# Patient Record
Sex: MALE | Race: WHITE | NOT HISPANIC OR LATINO | Employment: OTHER | URBAN - NONMETROPOLITAN AREA
[De-identification: names, ages, dates, MRNs, and addresses within clinical notes are randomized per-mention and may not be internally consistent; named-entity substitution may affect disease eponyms.]

---

## 2017-08-14 ENCOUNTER — OUTSIDE FACILITY SERVICE (OUTPATIENT)
Dept: CARDIOLOGY | Facility: CLINIC | Age: 67
End: 2017-08-14

## 2017-08-14 PROCEDURE — 93010 ELECTROCARDIOGRAM REPORT: CPT | Performed by: INTERNAL MEDICINE

## 2017-08-15 ENCOUNTER — APPOINTMENT (OUTPATIENT)
Dept: GENERAL RADIOLOGY | Facility: HOSPITAL | Age: 67
End: 2017-08-15

## 2017-08-15 ENCOUNTER — HOSPITAL ENCOUNTER (INPATIENT)
Facility: HOSPITAL | Age: 67
LOS: 4 days | Discharge: HOME HEALTH | End: 2017-08-19
Attending: FAMILY MEDICINE | Admitting: INTERNAL MEDICINE

## 2017-08-15 ENCOUNTER — APPOINTMENT (OUTPATIENT)
Dept: CARDIOLOGY | Facility: HOSPITAL | Age: 67
End: 2017-08-15
Attending: FAMILY MEDICINE

## 2017-08-15 DIAGNOSIS — Z74.09 IMPAIRED MOBILITY AND ADLS: ICD-10-CM

## 2017-08-15 DIAGNOSIS — Z74.09 IMPAIRED FUNCTIONAL MOBILITY, BALANCE, GAIT, AND ENDURANCE: ICD-10-CM

## 2017-08-15 DIAGNOSIS — Z78.9 IMPAIRED MOBILITY AND ADLS: ICD-10-CM

## 2017-08-15 PROBLEM — J18.9 HCAP (HEALTHCARE-ASSOCIATED PNEUMONIA): Status: ACTIVE | Noted: 2017-08-15

## 2017-08-15 LAB
ALBUMIN SERPL-MCNC: 3.3 G/DL (ref 3.5–5)
ALBUMIN/GLOB SERPL: 1 G/DL (ref 1.1–2.5)
ALP SERPL-CCNC: 48 U/L (ref 24–120)
ALT SERPL W P-5'-P-CCNC: 27 U/L (ref 0–54)
ANION GAP SERPL CALCULATED.3IONS-SCNC: 9 MMOL/L (ref 4–13)
ARTERIAL PATENCY WRIST A: ABNORMAL
ARTERIAL PATENCY WRIST A: ABNORMAL
AST SERPL-CCNC: 15 U/L (ref 7–45)
ATMOSPHERIC PRESS: ABNORMAL MMHG
ATMOSPHERIC PRESS: ABNORMAL MMHG
BASE EXCESS BLDA CALC-SCNC: 6.2 MMOL/L (ref -2–2)
BASE EXCESS BLDA CALC-SCNC: 6.8 MMOL/L (ref -2–2)
BASOPHILS # BLD AUTO: 0.02 10*3/MM3 (ref 0–0.2)
BASOPHILS NFR BLD AUTO: 0.2 % (ref 0–2)
BDY SITE: ABNORMAL
BDY SITE: ABNORMAL
BH CV ECHO MEAS - AO MAX PG (FULL): 2.8 MMHG
BH CV ECHO MEAS - AO MAX PG: 5.2 MMHG
BH CV ECHO MEAS - AO MEAN PG (FULL): 2 MMHG
BH CV ECHO MEAS - AO MEAN PG: 3 MMHG
BH CV ECHO MEAS - AO ROOT AREA: 12.6 CM^2
BH CV ECHO MEAS - AO ROOT DIAM: 4 CM
BH CV ECHO MEAS - AO V2 MAX: 114 CM/SEC
BH CV ECHO MEAS - AO V2 MEAN: 78.4 CM/SEC
BH CV ECHO MEAS - AO V2 VTI: 19.8 CM
BH CV ECHO MEAS - AVA(I,A): 2.4 CM^2
BH CV ECHO MEAS - AVA(I,D): 2.4 CM^2
BH CV ECHO MEAS - AVA(V,A): 2.3 CM^2
BH CV ECHO MEAS - AVA(V,D): 2.3 CM^2
BH CV ECHO MEAS - CONTRAST EF 4CH: 40.4 ML/M^2
BH CV ECHO MEAS - EDV(CUBED): 171 ML
BH CV ECHO MEAS - EDV(MOD-SP4): 203 ML
BH CV ECHO MEAS - EDV(TEICH): 150.5 ML
BH CV ECHO MEAS - EF(CUBED): 42.5 %
BH CV ECHO MEAS - EF(MOD-SP4): 40.4 %
BH CV ECHO MEAS - EF(TEICH): 34.8 %
BH CV ECHO MEAS - ESV(CUBED): 98.3 ML
BH CV ECHO MEAS - ESV(MOD-SP4): 121 ML
BH CV ECHO MEAS - ESV(TEICH): 98.1 ML
BH CV ECHO MEAS - FS: 16.8 %
BH CV ECHO MEAS - IVS/LVPW: 1
BH CV ECHO MEAS - IVSD: 1.2 CM
BH CV ECHO MEAS - LA DIMENSION: 5.2 CM
BH CV ECHO MEAS - LA/AO: 1.3
BH CV ECHO MEAS - LAT PEAK E' VEL: 8.6 CM/SEC
BH CV ECHO MEAS - LV MASS(C)D: 282 GRAMS
BH CV ECHO MEAS - LV MAX PG: 2.4 MMHG
BH CV ECHO MEAS - LV MEAN PG: 1 MMHG
BH CV ECHO MEAS - LV V1 MAX: 76.9 CM/SEC
BH CV ECHO MEAS - LV V1 MEAN: 50.2 CM/SEC
BH CV ECHO MEAS - LV V1 VTI: 13.7 CM
BH CV ECHO MEAS - LVIDD: 5.6 CM
BH CV ECHO MEAS - LVIDS: 4.6 CM
BH CV ECHO MEAS - LVLD AP4: 9.8 CM
BH CV ECHO MEAS - LVLS AP4: 9 CM
BH CV ECHO MEAS - LVOT AREA (M): 3.5 CM^2
BH CV ECHO MEAS - LVOT AREA: 3.5 CM^2
BH CV ECHO MEAS - LVOT DIAM: 2.1 CM
BH CV ECHO MEAS - LVPWD: 1.2 CM
BH CV ECHO MEAS - MV DEC TIME: 0.18 SEC
BH CV ECHO MEAS - MV E MAX VEL: 107 CM/SEC
BH CV ECHO MEAS - RAP SYSTOLE: 5 MMHG
BH CV ECHO MEAS - RVSP: 30.8 MMHG
BH CV ECHO MEAS - SV(AO): 248.8 ML
BH CV ECHO MEAS - SV(CUBED): 72.7 ML
BH CV ECHO MEAS - SV(LVOT): 47.5 ML
BH CV ECHO MEAS - SV(MOD-SP4): 82 ML
BH CV ECHO MEAS - SV(TEICH): 52.4 ML
BH CV ECHO MEAS - TR MAX VEL: 254 CM/SEC
BILIRUB SERPL-MCNC: 0.5 MG/DL (ref 0.1–1)
BILIRUB UR QL STRIP: NEGATIVE
BUN BLD-MCNC: 17 MG/DL (ref 5–21)
BUN/CREAT SERPL: 14 (ref 7–25)
CALCIUM SPEC-SCNC: 8.2 MG/DL (ref 8.4–10.4)
CHLORIDE SERPL-SCNC: 102 MMOL/L (ref 98–110)
CLARITY UR: CLEAR
CO2 SERPL-SCNC: 34 MMOL/L (ref 24–31)
COLOR UR: YELLOW
CREAT BLD-MCNC: 1.21 MG/DL (ref 0.5–1.4)
D-LACTATE SERPL-SCNC: 1.5 MMOL/L (ref 0.5–2)
DEPRECATED RDW RBC AUTO: 49.2 FL (ref 40–54)
E/E' RATIO: 22.3
EOSINOPHIL # BLD AUTO: 0.28 10*3/MM3 (ref 0–0.7)
EOSINOPHIL NFR BLD AUTO: 2.9 % (ref 0–4)
EPAP: 8
EPAP: 8
ERYTHROCYTE [DISTWIDTH] IN BLOOD BY AUTOMATED COUNT: 14.7 % (ref 12–15)
GFR SERPL CREATININE-BSD FRML MDRD: 60 ML/MIN/1.73
GLOBULIN UR ELPH-MCNC: 3.2 GM/DL
GLUCOSE BLD-MCNC: 95 MG/DL (ref 70–100)
GLUCOSE UR STRIP-MCNC: NEGATIVE MG/DL
HCO3 BLDA-SCNC: 34.2 MMOL/L (ref 22–26)
HCO3 BLDA-SCNC: 36 MMOL/L (ref 22–26)
HCT VFR BLD AUTO: 31.5 % (ref 40–52)
HGB BLD-MCNC: 9.8 G/DL (ref 14–18)
HGB UR QL STRIP.AUTO: NEGATIVE
HOROWITZ INDEX BLD+IHG-RTO: 21 %
HOROWITZ INDEX BLD+IHG-RTO: 40 %
IMM GRANULOCYTES # BLD: 0.03 10*3/MM3 (ref 0–0.03)
IMM GRANULOCYTES NFR BLD: 0.3 % (ref 0–5)
IPAP: 18
IPAP: 20
KETONES UR QL STRIP: NEGATIVE
LEFT ATRIUM VOLUME: 106 CM3
LEUKOCYTE ESTERASE UR QL STRIP.AUTO: NEGATIVE
LYMPHOCYTES # BLD AUTO: 0.92 10*3/MM3 (ref 0.72–4.86)
LYMPHOCYTES NFR BLD AUTO: 9.5 % (ref 15–45)
Lab: ABNORMAL
MAGNESIUM SERPL-MCNC: 1.8 MG/DL (ref 1.4–2.2)
MCH RBC QN AUTO: 28.6 PG (ref 28–32)
MCHC RBC AUTO-ENTMCNC: 31.1 G/DL (ref 33–36)
MCV RBC AUTO: 91.8 FL (ref 82–95)
MODALITY: ABNORMAL
MODALITY: ABNORMAL
MONOCYTES # BLD AUTO: 0.53 10*3/MM3 (ref 0.19–1.3)
MONOCYTES NFR BLD AUTO: 5.5 % (ref 4–12)
NEUTROPHILS # BLD AUTO: 7.94 10*3/MM3 (ref 1.87–8.4)
NEUTROPHILS NFR BLD AUTO: 81.6 % (ref 39–78)
NITRITE UR QL STRIP: NEGATIVE
NOTIFIED BY: ABNORMAL
NOTIFIED WHO: ABNORMAL
PCO2 BLDA: 60.2 MM HG (ref 35–45)
PCO2 BLDA: 77.3 MM HG (ref 35–45)
PH BLDA: 7.29 PH UNITS (ref 7.35–7.45)
PH BLDA: 7.37 PH UNITS (ref 7.35–7.45)
PH UR STRIP.AUTO: <=5 [PH] (ref 5–8)
PHOSPHATE SERPL-MCNC: 4.5 MG/DL (ref 2.5–4.5)
PLATELET # BLD AUTO: 129 10*3/MM3 (ref 130–400)
PMV BLD AUTO: 11.1 FL (ref 6–12)
PO2 BLDA: 57.7 MM HG (ref 80–100)
PO2 BLDA: 96.2 MM HG (ref 80–100)
POTASSIUM BLD-SCNC: 3.7 MMOL/L (ref 3.5–5.3)
PROT SERPL-MCNC: 6.5 G/DL (ref 6.3–8.7)
PROT UR QL STRIP: NEGATIVE
RBC # BLD AUTO: 3.43 10*6/MM3 (ref 4.8–5.9)
SAO2 % BLDCOA: 88.6 % (ref 94–100)
SAO2 % BLDCOA: 88.6 % (ref 94–100)
SAO2 % BLDCOA: 96.2 % (ref 94–100)
SAO2 % BLDCOA: 96.2 % (ref 94–100)
SODIUM BLD-SCNC: 145 MMOL/L (ref 135–145)
SP GR UR STRIP: >1.03 (ref 1–1.03)
TOTAL RATE: 12 BREATHS/MINUTE
TOTAL RATE: 12 BREATHS/MINUTE
TROPONIN I SERPL-MCNC: 0.02 NG/ML (ref 0–0.03)
UROBILINOGEN UR QL STRIP: ABNORMAL
WBC NRBC COR # BLD: 9.72 10*3/MM3 (ref 4.8–10.8)

## 2017-08-15 PROCEDURE — 94799 UNLISTED PULMONARY SVC/PX: CPT

## 2017-08-15 PROCEDURE — 94660 CPAP INITIATION&MGMT: CPT

## 2017-08-15 PROCEDURE — 5A09457 ASSISTANCE WITH RESPIRATORY VENTILATION, 24-96 CONSECUTIVE HOURS, CONTINUOUS POSITIVE AIRWAY PRESSURE: ICD-10-PCS | Performed by: INTERNAL MEDICINE

## 2017-08-15 PROCEDURE — 25010000002 PIPERACILLIN SOD-TAZOBACTAM PER 1 G: Performed by: FAMILY MEDICINE

## 2017-08-15 PROCEDURE — 25010000002 VANCOMYCIN 10 G RECONSTITUTED SOLUTION

## 2017-08-15 PROCEDURE — 84100 ASSAY OF PHOSPHORUS: CPT | Performed by: INTERNAL MEDICINE

## 2017-08-15 PROCEDURE — 81003 URINALYSIS AUTO W/O SCOPE: CPT | Performed by: FAMILY MEDICINE

## 2017-08-15 PROCEDURE — 80053 COMPREHEN METABOLIC PANEL: CPT | Performed by: INTERNAL MEDICINE

## 2017-08-15 PROCEDURE — 84484 ASSAY OF TROPONIN QUANT: CPT | Performed by: INTERNAL MEDICINE

## 2017-08-15 PROCEDURE — 94760 N-INVAS EAR/PLS OXIMETRY 1: CPT

## 2017-08-15 PROCEDURE — 93306 TTE W/DOPPLER COMPLETE: CPT | Performed by: INTERNAL MEDICINE

## 2017-08-15 PROCEDURE — 82803 BLOOD GASES ANY COMBINATION: CPT

## 2017-08-15 PROCEDURE — C8929 TTE W OR WO FOL WCON,DOPPLER: HCPCS

## 2017-08-15 PROCEDURE — 25010000002 LEVOFLOXACIN PER 250 MG: Performed by: INTERNAL MEDICINE

## 2017-08-15 PROCEDURE — 71010 HC CHEST PA OR AP: CPT

## 2017-08-15 PROCEDURE — 25010000002 HEPARIN (PORCINE) PER 1000 UNITS: Performed by: FAMILY MEDICINE

## 2017-08-15 PROCEDURE — 36600 WITHDRAWAL OF ARTERIAL BLOOD: CPT

## 2017-08-15 PROCEDURE — 25010000002 FUROSEMIDE PER 20 MG: Performed by: FAMILY MEDICINE

## 2017-08-15 PROCEDURE — 94640 AIRWAY INHALATION TREATMENT: CPT

## 2017-08-15 PROCEDURE — 25010000002 VANCOMYCIN PER 500 MG: Performed by: FAMILY MEDICINE

## 2017-08-15 PROCEDURE — 87040 BLOOD CULTURE FOR BACTERIA: CPT | Performed by: FAMILY MEDICINE

## 2017-08-15 PROCEDURE — 85025 COMPLETE CBC W/AUTO DIFF WBC: CPT | Performed by: INTERNAL MEDICINE

## 2017-08-15 PROCEDURE — 25010000002 PERFLUTREN (DEFINITY) 8.476 MG IN SODIUM CHLORIDE 10 ML INJECTION: Performed by: INTERNAL MEDICINE

## 2017-08-15 PROCEDURE — 83605 ASSAY OF LACTIC ACID: CPT | Performed by: FAMILY MEDICINE

## 2017-08-15 PROCEDURE — 87086 URINE CULTURE/COLONY COUNT: CPT | Performed by: FAMILY MEDICINE

## 2017-08-15 PROCEDURE — 83735 ASSAY OF MAGNESIUM: CPT | Performed by: INTERNAL MEDICINE

## 2017-08-15 RX ORDER — DILTIAZEM HYDROCHLORIDE 240 MG/1
240 CAPSULE, COATED, EXTENDED RELEASE ORAL DAILY
Status: ON HOLD | COMMUNITY
End: 2017-08-15

## 2017-08-15 RX ORDER — HEPARIN SODIUM 5000 [USP'U]/ML
5000 INJECTION, SOLUTION INTRAVENOUS; SUBCUTANEOUS EVERY 8 HOURS SCHEDULED
Status: DISCONTINUED | OUTPATIENT
Start: 2017-08-15 | End: 2017-08-16

## 2017-08-15 RX ORDER — METOPROLOL SUCCINATE 100 MG/1
100 TABLET, EXTENDED RELEASE ORAL 2 TIMES DAILY
Status: ON HOLD | COMMUNITY
End: 2018-03-19

## 2017-08-15 RX ORDER — GUAIFENESIN 600 MG/1
1200 TABLET, EXTENDED RELEASE ORAL EVERY 12 HOURS SCHEDULED
Status: DISCONTINUED | OUTPATIENT
Start: 2017-08-15 | End: 2017-08-19 | Stop reason: HOSPADM

## 2017-08-15 RX ORDER — QUETIAPINE 200 MG/1
400 TABLET, FILM COATED, EXTENDED RELEASE ORAL NIGHTLY
Status: DISCONTINUED | OUTPATIENT
Start: 2017-08-15 | End: 2017-08-15 | Stop reason: DRUGHIGH

## 2017-08-15 RX ORDER — DIVALPROEX SODIUM 500 MG/1
1000 TABLET, DELAYED RELEASE ORAL DAILY
COMMUNITY

## 2017-08-15 RX ORDER — FUROSEMIDE 10 MG/ML
40 INJECTION INTRAMUSCULAR; INTRAVENOUS DAILY
Status: DISCONTINUED | OUTPATIENT
Start: 2017-08-15 | End: 2017-08-16

## 2017-08-15 RX ORDER — QUETIAPINE FUMARATE 200 MG/1
400 TABLET, FILM COATED ORAL NIGHTLY
Status: DISCONTINUED | OUTPATIENT
Start: 2017-08-15 | End: 2017-08-19 | Stop reason: HOSPADM

## 2017-08-15 RX ORDER — ASPIRIN 81 MG/1
81 TABLET ORAL DAILY
COMMUNITY

## 2017-08-15 RX ORDER — ASPIRIN 81 MG/1
81 TABLET ORAL DAILY
Status: DISCONTINUED | OUTPATIENT
Start: 2017-08-16 | End: 2017-08-19 | Stop reason: HOSPADM

## 2017-08-15 RX ORDER — QUETIAPINE FUMARATE 400 MG/1
500 TABLET, FILM COATED ORAL NIGHTLY
COMMUNITY
End: 2018-03-25 | Stop reason: HOSPADM

## 2017-08-15 RX ORDER — ASPIRIN 325 MG
325 TABLET, DELAYED RELEASE (ENTERIC COATED) ORAL DAILY
Status: DISCONTINUED | OUTPATIENT
Start: 2017-08-15 | End: 2017-08-15 | Stop reason: DRUGHIGH

## 2017-08-15 RX ORDER — ALLOPURINOL 100 MG/1
100 TABLET ORAL DAILY
COMMUNITY

## 2017-08-15 RX ORDER — DABIGATRAN ETEXILATE 150 MG/1
150 CAPSULE ORAL 2 TIMES DAILY
COMMUNITY

## 2017-08-15 RX ORDER — IPRATROPIUM BROMIDE AND ALBUTEROL SULFATE 2.5; .5 MG/3ML; MG/3ML
3 SOLUTION RESPIRATORY (INHALATION) EVERY 4 HOURS PRN
Status: DISCONTINUED | OUTPATIENT
Start: 2017-08-15 | End: 2017-08-15

## 2017-08-15 RX ORDER — DILTIAZEM HYDROCHLORIDE 240 MG/1
240 CAPSULE, COATED, EXTENDED RELEASE ORAL
Status: DISCONTINUED | OUTPATIENT
Start: 2017-08-15 | End: 2017-08-15 | Stop reason: DRUGHIGH

## 2017-08-15 RX ORDER — QUETIAPINE FUMARATE 200 MG/1
200 TABLET, FILM COATED ORAL NIGHTLY
Status: ON HOLD | COMMUNITY
End: 2017-08-15

## 2017-08-15 RX ORDER — LEVOFLOXACIN 5 MG/ML
750 INJECTION, SOLUTION INTRAVENOUS EVERY 24 HOURS
Status: DISCONTINUED | OUTPATIENT
Start: 2017-08-15 | End: 2017-08-16

## 2017-08-15 RX ORDER — DILTIAZEM HYDROCHLORIDE 180 MG/1
180 CAPSULE, COATED, EXTENDED RELEASE ORAL
Status: DISCONTINUED | OUTPATIENT
Start: 2017-08-16 | End: 2017-08-19

## 2017-08-15 RX ORDER — METOPROLOL SUCCINATE 100 MG/1
100 TABLET, EXTENDED RELEASE ORAL EVERY 12 HOURS SCHEDULED
Status: DISCONTINUED | OUTPATIENT
Start: 2017-08-15 | End: 2017-08-19 | Stop reason: HOSPADM

## 2017-08-15 RX ORDER — ATORVASTATIN CALCIUM 20 MG/1
20 TABLET, FILM COATED ORAL DAILY
COMMUNITY

## 2017-08-15 RX ORDER — SODIUM CHLORIDE 0.9 % (FLUSH) 0.9 %
1-10 SYRINGE (ML) INJECTION AS NEEDED
Status: DISCONTINUED | OUTPATIENT
Start: 2017-08-15 | End: 2017-08-19 | Stop reason: HOSPADM

## 2017-08-15 RX ORDER — DILTIAZEM HYDROCHLORIDE 180 MG/1
180 CAPSULE, COATED, EXTENDED RELEASE ORAL DAILY
COMMUNITY
End: 2017-08-19 | Stop reason: HOSPADM

## 2017-08-15 RX ORDER — LISINOPRIL 20 MG/1
40 TABLET ORAL DAILY
Status: DISCONTINUED | OUTPATIENT
Start: 2017-08-15 | End: 2017-08-15

## 2017-08-15 RX ORDER — IPRATROPIUM BROMIDE AND ALBUTEROL SULFATE 2.5; .5 MG/3ML; MG/3ML
3 SOLUTION RESPIRATORY (INHALATION)
Status: DISCONTINUED | OUTPATIENT
Start: 2017-08-15 | End: 2017-08-17

## 2017-08-15 RX ADMIN — IPRATROPIUM BROMIDE AND ALBUTEROL SULFATE 3 ML: .5; 3 SOLUTION RESPIRATORY (INHALATION) at 08:08

## 2017-08-15 RX ADMIN — VANCOMYCIN HYDROCHLORIDE 2000 MG: 1 INJECTION, POWDER, LYOPHILIZED, FOR SOLUTION INTRAVENOUS at 08:40

## 2017-08-15 RX ADMIN — SODIUM CHLORIDE 750 MG: 900 INJECTION, SOLUTION INTRAVENOUS at 23:19

## 2017-08-15 RX ADMIN — GUAIFENESIN 1200 MG: 600 TABLET, EXTENDED RELEASE ORAL at 20:13

## 2017-08-15 RX ADMIN — TAZOBACTAM SODIUM AND PIPERACILLIN SODIUM 4.5 G: 500; 4 INJECTION, SOLUTION INTRAVENOUS at 08:40

## 2017-08-15 RX ADMIN — METOPROLOL SUCCINATE 100 MG: 100 TABLET, FILM COATED, EXTENDED RELEASE ORAL at 20:13

## 2017-08-15 RX ADMIN — LEVOFLOXACIN 750 MG: 5 INJECTION, SOLUTION INTRAVENOUS at 08:40

## 2017-08-15 RX ADMIN — HEPARIN SODIUM 5000 UNITS: 5000 INJECTION, SOLUTION INTRAVENOUS; SUBCUTANEOUS at 15:00

## 2017-08-15 RX ADMIN — IPRATROPIUM BROMIDE AND ALBUTEROL SULFATE 3 ML: .5; 3 SOLUTION RESPIRATORY (INHALATION) at 12:25

## 2017-08-15 RX ADMIN — METOPROLOL TARTRATE 150 MG: 100 TABLET ORAL at 08:40

## 2017-08-15 RX ADMIN — DILTIAZEM HYDROCHLORIDE 240 MG: 240 CAPSULE, EXTENDED RELEASE ORAL at 08:40

## 2017-08-15 RX ADMIN — HEPARIN SODIUM 5000 UNITS: 5000 INJECTION, SOLUTION INTRAVENOUS; SUBCUTANEOUS at 08:41

## 2017-08-15 RX ADMIN — TAZOBACTAM SODIUM AND PIPERACILLIN SODIUM 4.5 G: 500; 4 INJECTION, SOLUTION INTRAVENOUS at 15:00

## 2017-08-15 RX ADMIN — SODIUM CHLORIDE 3 ML: 9 INJECTION INTRAMUSCULAR; INTRAVENOUS; SUBCUTANEOUS at 11:01

## 2017-08-15 RX ADMIN — IPRATROPIUM BROMIDE AND ALBUTEROL SULFATE 3 ML: .5; 3 SOLUTION RESPIRATORY (INHALATION) at 18:52

## 2017-08-15 RX ADMIN — TAZOBACTAM SODIUM AND PIPERACILLIN SODIUM 4.5 G: 500; 4 INJECTION, SOLUTION INTRAVENOUS at 20:14

## 2017-08-15 RX ADMIN — ASPIRIN 325 MG: 325 TABLET, COATED ORAL at 08:40

## 2017-08-15 RX ADMIN — HEPARIN SODIUM 5000 UNITS: 5000 INJECTION, SOLUTION INTRAVENOUS; SUBCUTANEOUS at 23:28

## 2017-08-15 RX ADMIN — QUETIAPINE FUMARATE 400 MG: 200 TABLET, FILM COATED ORAL at 20:13

## 2017-08-15 RX ADMIN — FUROSEMIDE 40 MG: 10 INJECTION, SOLUTION INTRAMUSCULAR; INTRAVENOUS at 08:41

## 2017-08-15 RX ADMIN — GUAIFENESIN 1200 MG: 600 TABLET, EXTENDED RELEASE ORAL at 08:40

## 2017-08-15 NOTE — PROGRESS NOTES
"Discharge Planning Assessment  [facility]     Patient Name: Froy Hoyos  MRN: [id number]  Today's Date: 8/15/2017    Admit Date: 8/15/2017          Discharge Needs Assessment       08/15/17 144    Living Environment    Lives With alone    Living Arrangements apartment    Transportation Available car;family or friend will provide    Living Environment    Provides Primary Care For no one    Quality Of Family Relationships supportive    Able to Return to Prior Living Arrangements yes    Discharge Needs Assessment    Concerns To Be Addressed care coordination/care conferences;discharge planning concerns    Readmission Within The Last 30 Days no previous admission in last 30 days    Anticipated Changes Related to Illness none    Equipment Currently Used at Home bipap/ cpap;oxygen    Current Discharge Risk chronically ill    Discharge Planning Comments Attempted to speak to patient regarding discharge plan/needs.  Patient stated, \"I don't need you, I need something to eat.\"  Patient reluctantly participated in discharge planning, stating he resides alone, and he does not drive but his friend takes him to his doctor's appointments.  Patient states he has a cpap and home O2.  Patient is [facility] with [facility].  Patient receives primary care from the [facility] clinic.  Patient's discharge summary will need to be faxed to the [facility] clinic.                Discharge Plan     None        Discharge Placement     No information found                Demographic Summary     None            Functional Status     None            Psychosocial     None            Abuse/Neglect     None            Legal     None            Substance Abuse     None            Patient Forms     None          Iliana Hankins, SHANW    "

## 2017-08-15 NOTE — CONSULTS
PULMONARY & CRITICAL CARE CONSULT - [facility]    08/15/17, 9:51 AM  Patient Care Team:  No Known Provider as PCP - General  Name: Froy Hoyos  : 1950  MRN: [id number]  Contact Serial Number [id number]    Chief complaint: Dyspnea    HPI:  We have been consulted by Clint Torres DO to see this 66 y.o. year old male born on 1950.  Patient complains of dyspnea in the chest for a few days. Severity: severe.  Aggravating factors: walking.   Alleviating factors: medication(s) (albuterol and atrovent) Associated symptoms: fatigue and fevers. Sputum is absent.  Patient currently is on oxygen at 3 L/min per nasal cannula.. Respiratory history: COPD  Pt presented by ems who found him hypoxic and on arrival to ER he was febrile, received antibiotics and appeard septic.  Pt known to have hx of chf.  Past Medical History:  Past Medical History:   Diagnosis Date   • Atrial fibrillation    • CHF (congestive heart failure)    • Hypertension    • Stroke      No past surgical history on file.  No Known Allergies  Medications:    aspirin 325 mg Oral Daily   diltiaZEM  mg Oral Q AM   furosemide 40 mg Intravenous Daily   guaiFENesin 1,200 mg Oral Q12H   heparin (porcine) 5,000 Units Subcutaneous Q8H   ipratropium-albuterol 3 mL Nebulization Q6H - RT   levoFLOXacin 750 mg Intravenous Q24H   metoprolol tartrate 150 mg Oral Q12H   perflutren      piperacillin-tazobactam 4.5 g Intravenous Q6H   And      vancomycin 20 mg/kg Intravenous Once   QUEtiapine  mg Oral Nightly   vancomycin 750 mg Intravenous Q12H       Pharmacy to dose vancomycin      Family History:  Family History   Problem Relation Age of Onset   • Diabetes Daughter      Social History:   reports that he quit smoking about 15 months ago. He does not have any smokeless tobacco history on file. He reports that he does not drink alcohol or use illicit drugs.  Review of Systems:  Review of Systems   Constitutional: Positive for fever.  Negative for appetite change, diaphoresis and fatigue.   HENT: Negative for congestion and trouble swallowing.    Eyes: Negative for visual disturbance.   Respiratory: Negative for choking, chest tightness, shortness of breath and stridor.    Cardiovascular: Negative for chest pain and leg swelling.   Gastrointestinal: Positive for vomiting. Negative for abdominal pain, constipation, diarrhea and nausea.   Endocrine: Negative for heat intolerance.   Genitourinary: Negative for hematuria.   Musculoskeletal: Negative for joint swelling.   Skin: Negative for color change, pallor and rash.   Neurological: Negative for dizziness, seizures and syncope.   Hematological: Negative for adenopathy.   Psychiatric/Behavioral: Negative for agitation.      Physical Exam:  Temp:  [97.7 °F (36.5 °C)-99 °F (37.2 °C)] 97.7 °F (36.5 °C)  Heart Rate:  [73-91] 83  Resp:  [16-21] 20  BP: (102-121)/(55-77) 121/77  Intake/Output Summary (Last 24 hours) at 08/15/17 0951  Last data filed at 08/15/17 0840   Gross per 24 hour   Intake              750 ml   Output              880 ml   Net             -130 ml     Last 3 weights    08/15/17  0020   Weight: 221 lb 9.6 oz (101 kg)     SpO2 Readings from Last 3 Encounters:   08/15/17 95%   10/02/16 93%   09/26/16 96%     Physical Exam   Constitutional: He appears well-developed. He appears listless. He is active. He appears toxic. He has a sickly appearance. He appears ill. No distress. He is not intubated. Nasal cannula in place.   HENT:   Nose: Nose normal.   Eyes: EOM are normal. No scleral icterus.   Neck: No JVD present. No tracheal deviation present.   Cardiovascular: Normal rate and regular rhythm.    Pulmonary/Chest: No accessory muscle usage. He is not intubated. No respiratory distress. He has no decreased breath sounds. He has no wheezes. He has rhonchi.   Abdominal: Soft. There is no tenderness. There is no guarding.   Musculoskeletal: He exhibits edema.   Neurological: He appears  listless. He exhibits normal muscle tone. Coordination normal.   Skin: Skin is warm and dry. No rash noted. He is not diaphoretic. No erythema.   Psychiatric: He has a normal mood and affect. His behavior is normal.       Results from last 7 days  Lab Units 08/15/17  0851   WBC 10*3/mm3 9.72   HEMOGLOBIN g/dL 9.8*   PLATELETS 10*3/mm3 129*       Results from last 7 days  Lab Units 08/15/17  0851   SODIUM mmol/L 145   POTASSIUM mmol/L 3.7   CO2 mmol/L 34.0*   BUN mg/dL 17   CREATININE mg/dL 1.21   MAGNESIUM mg/dL 1.8   PHOSPHORUS mg/dL 4.5   GLUCOSE mg/dL 95       Results from last 7 days  Lab Units 08/15/17  0331 08/15/17  0226   PH, ARTERIAL pH units 7.372 7.286*   PCO2, ARTERIAL mm Hg 60.2* 77.3*   PO2 ART mm Hg 57.7* 96.2   FIO2 % 21 40     Lab Results   Component Value Date    PROBNP 6620.0 (H) 09/30/2016        Recent radiology:   Imaging Results (last 72 hours)     Procedure Component Value Units Date/Time    XR Chest 1 View [[id number]] Collected:  08/15/17 0820     Updated:  08/15/17 0824    Narrative:       Exam:   XR CHEST 1 VW-       Date:  08/15/2017      History:  Male, age  66 years;hypoxia     COMPARISON:  None.     Findings :     The heart and mediastinum are borderline in size which may be partially  due to low lung volumes as well as technique. Low lung volumes.. Lungs  are without focal infiltrate, mass or effusions.  The bones show no  acute pathology.         Impression:       Impression:     Mild prominence of cardiac mediastinal silhouette, with mild prominence  of the central pulmonary vasculature, concerning for mild/early fluid  overload.     This report was finalized on 08/15/2017 08:21 by Dr. Lu Howell MD.        My radiograph interpretation/independent review of imaging: hypoaeration, early volume overload    Other test results (not lab or imaging): echo today  · Left ventricular wall thickness is consistent with mild concentric hypertrophy.  · Left ventricular systolic function is  mildly decreased. Estimated ejection fraction is 41-45%.  · Left atrial cavity size is moderately dilated.  · Normal right ventricular cavity size and systolic function noted.  · Mild aortic, mitral, and tricuspid, valve regurgitation is present.      Independent review of ekg: no EKG this admission    Patient Active Problem List   Diagnosis   • Chronic congestive heart failure   • HCAP (healthcare-associated pneumonia)     Pulmonary Assessment:    New problem (to me), with additional workup planned: Acute/Chronic Hypercapnic/hypoxemic respiratory failure    New problem (to me), no additional workup planned: fever    Other problems either stable, failing to improve or worsenin. Systolic Heart failure may be contibuting  2. Volume overload  3. Anemia  4. Atrial Fibrillation  5. Hypertension  6. CVA    Recommend/plan:     · Continue bipap therapy for now as he does seem to be making some progress  · Titrate oxygen for saturation between 88-92% to avoid CO2 retention  · Continue broad spectrum abx coverage while cultures are pending  · ABG and CXR in the am  · Continue bronchodilators and mucolytics  · Diuresis per attending    Electronically signed by KEERTHI Tam, 08/15/17, 9:51 AM    Physician substantive contribution:  Pertinent symptoms/interval history include: pt with fever hx chf and lung infiltrates, respiratory failure  Respiratory exam shows pertinent findings of:crackles.  Plan includes: follow up lab/imaging as above, antibiotics and icu care  Follow up cxr to evaluate for evolving infiltrate.  I have seen and examined patient personally, performing a face-to-face diagnostic evaluation with plan of care reviewed and developed with APRN and nursing staff. I have addended and/or modified the above history of present illness, physical examination, and assessment and plan to reflect my findings and impressions. Essential elements of the care plan were discussed with APRN above.  Agree with  findings and assessment/plan as documented above.    Electronically signed by Ronald Recio MD, on 8/15/2017, 11:07 PM

## 2017-08-15 NOTE — PROGRESS NOTES
"Pharmacokinetic Initial Note - Vancomycin    Froy Hoyos is a 66 y.o. male   [Ht: 67\" (170.2 cm); Wt: 221 lb 9.6 oz (101 kg)]    CrCl cannot be calculated (Patient's most recent sCr result is older than the maximum 30 days allowed.).   Lab Results   Component Value Date    CREATININE 1.25 10/02/2016    CREATININE 1.14 10/01/2016    CREATININE 1.19 09/30/2016      Lab Results   Component Value Date    WBC 5.16 10/02/2016    WBC 5.22 10/01/2016    WBC 5.83 09/30/2016      Baseline culture results:  Microbiology Results (last 10 days)       ** No results found for the last 240 hours. **          Indication for use: Pneumonia     Assessment/Plan  Vancomycin 2000 mg IVPB x1.  Continuing Vancomycin 750mg iv Q12H (2100/0900).  Patient is also receiving Zosyn. Will order Vancomycin trough level when pharmacokinetically appropriate.  Pharmacy will monitor renal function and adjust dose accordingly.    Rito Ruff [facility]  08/15/17 7:52 AM     "

## 2017-08-15 NOTE — PLAN OF CARE
"Problem: Patient Care Overview (Adult)  Goal: Adult Individualization and Mutuality    08/15/17 0923   Individualization   Patient Specific Preferences Friends call him Ted   Mutuality/Individual Preferences   What Anxieties, Fears or Concerns Do You Have About Your Health or Care? \"Were not familiar with him and he always goes to [facility], doesnt want us to mess up his meds\"           "

## 2017-08-15 NOTE — PLAN OF CARE
Problem: Fall Risk (Adult)  Goal: Identify Related Risk Factors and Signs and Symptoms  Outcome: Ongoing (interventions implemented as appropriate)    08/15/17 0505   Fall Risk   Fall Risk: Related Risk Factors confusion/agitation;fatigue/slow reaction   Fall Risk: Signs and Symptoms presence of risk factors       Goal: Absence of Falls  Outcome: Ongoing (interventions implemented as appropriate)    Problem: Sepsis (Adult)  Goal: Signs and Symptoms of Listed Potential Problems Will be Absent or Manageable (Sepsis)  Outcome: Ongoing (interventions implemented as appropriate)    Problem: Respiratory Insufficiency (Adult)  Goal: Identify Related Risk Factors and Signs and Symptoms  Outcome: Outcome(s) achieved Date Met:  08/15/17  Goal: Acid/Base Balance  Outcome: Ongoing (interventions implemented as appropriate)  Goal: Effective Ventilation  Outcome: Ongoing (interventions implemented as appropriate)    Problem: Skin Integrity Impairment, Risk/Actual (Adult)  Goal: Identify Related Risk Factors and Signs and Symptoms  Outcome: Outcome(s) achieved Date Met:  08/15/17  Goal: Skin Integrity/Wound Healing  Outcome: Ongoing (interventions implemented as appropriate)

## 2017-08-15 NOTE — PAYOR COMM NOTE
"Braxton Hoyos (66 y.o. Male)     Date of Birth Social Security Number Address Home Phone MRN    [id number]  [facility] [place] [address] [phone] [id number]    Alevism Marital Status          None        Admission Date Admission Type Admitting Provider Attending Provider Department, Room/Bed    8/15/17 Urgent Clint Torres DO Hancock, John C,  [facility] CARDIAC CARE, C005/1    Discharge Date Discharge Disposition Discharge Destination                      Attending Provider: Clint Torres DO     Allergies:  No Known Allergies    Isolation:  None   Infection:  None   Code Status:  FULL    Ht:  67\" (170.2 cm)   Wt:  221 lb 9.6 oz (101 kg)    Admission Cmt:  None   Principal Problem:  None                Active Insurance as of 8/15/2017     Primary Coverage     Payor Plan Insurance Group Employer/Plan Group    [id number]      Payor Plan Address Payor Plan Phone Number Effective From Effective To    [address] [phone] 8/13/2017     [place] [address]       Subscriber Name Subscriber Birth Date Member ID       BRAXTON HOYOS [id number]                  Emergency Contacts      (Rel.) Home Phone Work Phone Mobile Phone    Roma Liu (Other) [phone] -- --    RomainMaksim (Other) -- -- [phone]               History & Physical      Jennifer Wyman MD at 8/15/2017  6:51 AM              [facility] Medicine Services  HISTORY AND PHYSICAL    Date of Admission: 8/15/2017  Primary Care Physician: No Known Provider    Subjective     Chief Complaint: Shortness of breath    History of Present Illness  66-year-old male with past medical history of atrial fibrillation, CHF, hypertension, stroke was seen at [facility] with shortness of breath.  When EMS arrived at patient's home patient's O2 saturation was found to be 70%.  Patient is on 3 L oxygen at home.  After transfer to the ER " patient was put on BiPAP.  In the ER patient was found to have a temperature of 103.5.  In the ER patient received a CT scan which was significant for pneumonia versus pulmonary edema.  Later patient's O2 saturation was noted to be 97% on BiPAP.  Patient received Zithromax and Rocephin in the ER.  Initially lactate was 2.1 later it was elevated to 2.5.  He is a regular patient at the [place] [place]  [facility] for further evaluation and treatment of pneumonia.  Patient was recently hospitalized about  for CHF exacerbation at the [place], [place].        Review of Systems     Otherwise complete ROS reviewed and negative except as mentioned in the HPI.      Past Medical History:   Past Medical History:   Diagnosis Date   • Atrial fibrillation    • CHF (congestive heart failure)    • Hypertension    • Stroke        Past Surgical History:No past surgical history on file.    Social History:  reports that he quit smoking about 15 months ago. He does not have any smokeless tobacco history on file. He reports that he does not drink alcohol or use illicit drugs.    Family History: family history includes Diabetes in his daughter.      Allergies:  No Known Allergies    Medications:  Prior to Admission medications    Medication Sig Start Date End Date Taking? Authorizing Provider   aspirin  MG EC tablet Take 1 tablet by mouth Daily. 10/2/16   Nasreen Morton,    diltiazem CD (CARDIZEM CD) 240 MG 24 hr capsule Take 240 mg by mouth Daily.    Historical Provider, MD   furosemide (LASIX) 40 MG tablet Take 40 mg by mouth 2 (Two) Times a Day.    Historical Provider, MD   lisinopril (PRINIVIL,ZESTRIL) 40 MG tablet Take 40 mg by mouth daily.    Historical Provider, MD   metoprolol tartrate (LOPRESSOR) 50 MG tablet Take 150 mg by mouth 2 (Two) Times a Day.    Historical Provider, MD   QUEtiapine XR (SEROquel XR) 400 MG 24 hr tablet Take 400 mg by mouth Every Night.  "   Historical Provider, MD       Objective     Vital Signs: /77  Pulse 87  Temp 97.9 °F (36.6 °C) (Temporal Artery )   Resp 20  Ht 67\" (170.2 cm)  Wt 221 lb 9.6 oz (101 kg)  SpO2 90%  BMI 34.71 kg/m2  Physical Exam   Constitutional: He appears well-developed and well-nourished.   HENT:   Head: Normocephalic and atraumatic.   Eyes: EOM are normal. Pupils are equal, round, and reactive to light.   Neck: Normal range of motion. Neck supple.   Cardiovascular: Normal rate and regular rhythm.    Pulmonary/Chest: He is in respiratory distress. He has wheezes. He has no rales. He exhibits no tenderness.   Abdominal: Soft. Bowel sounds are normal.   Musculoskeletal: Normal range of motion.   Neurological:   Altered mental status   Skin: Skin is warm and dry.   Psychiatric:   Unable to assess due to altered mental status           Results Reviewed:  Lab Results (last 24 hours)     Procedure Component Value Units Date/Time    Urine Culture [[id number]] Collected:  08/15/17 0023    Specimen:  Urine from Urine, Clean Catch Updated:  08/15/17 0029    Urinalysis With / Microscopic If Indicated [[id number]]  (Abnormal) Collected:  08/15/17 0023    Specimen:  Urine from Urine, Clean Catch Updated:  08/15/17 0032     Color, UA Yellow     Appearance, UA Clear     pH, UA <=5.0     Specific Gravity, UA >1.030 (H)     Glucose, UA Negative     Ketones, UA Negative     Bilirubin, UA Negative     Blood, UA Negative     Protein, UA Negative     Leuk Esterase, UA Negative     Nitrite, UA Negative     Urobilinogen, UA 1.0 E.U./dL    Narrative:       Urine microscopic not indicated.    Blood Gas, Arterial [[id number]]  (Abnormal) Collected:  08/15/17 0226    Specimen:  Arterial Blood Updated:  08/15/17 0230     Site Arterial: right radial     Cristobal's Test --      Documented in Rapid Comm        pH, Arterial 7.286 (L) pH units      pCO2, Arterial 77.3 (C) mm Hg      pO2, Arterial 96.2 mm Hg      HCO3, Arterial 36.0 (H) mmol/L      Base " Excess, Arterial 6.2 (H) mmol/L      O2 Saturation, Arterial 96.2 %      O2 Saturation Calculated 96.2 %      Barometric Pressure for Blood Gas -- mmHg       Component not reported at this site.        Modality BiPAP     FIO2 40 %      Rate 12.0 Breaths/minute      IPAP 18     EPAP 8     Notified [facility] [id number]     Notified By [id number]     Notified Time 8/15/2017 2:29:45 AM    Narrative:       Serial Number: [id number]    : [id number]    Blood Gas, Arterial [[id number]]  (Abnormal) Collected:  08/15/17 0331    Specimen:  Arterial Blood Updated:  08/15/17 0333     Site Arterial: right radial     Cristobal's Test --      Documented in Rapid Comm        pH, Arterial 7.372 pH units      pCO2, Arterial 60.2 (H) mm Hg      pO2, Arterial 57.7 (L) mm Hg      HCO3, Arterial 34.2 (H) mmol/L      Base Excess, Arterial 6.8 (H) mmol/L      O2 Saturation, Arterial 88.6 (L) %      O2 Saturation Calculated 88.6 (L) %      Barometric Pressure for Blood Gas -- mmHg       Component not reported at this site.        Modality BiPAP     FIO2 21 %      Rate 12.0 Breaths/minute      IPAP 20     EPAP 8    Narrative:       Serial Number: [id number]    : [id number]    Lactic Acid, Plasma [[id number]]  (Normal) Collected:  08/15/17 0523    Specimen:  Blood Updated:  08/15/17 0546     Lactate 1.5 mmol/L         Imaging Results (last 24 hours)     ** No results found for the last 24 hours. **          I have personally reviewed and interpreted the radiology studies and ECG obtained at time of admission.     Assessment / Plan     Assessment & Plan    Assessment:    1.  Sepsis secondary to HCAP  2.  Leukocytosis  3.  Hypernatremia  4.  systolic heart failure-ejection fraction 35% in July 2017  5.  Atrial fibrillation  6.  Hypertension    Plan:    -Admit to CCU  -Continue cardiac monitoring  -Continuous pulse ox  -Continue BiPAP  -Follow-up pulmonology consult  -Follow-up ABG  -Continue IV antibiotics  -Follow-up lactate  -Follow-up ID consult  -Follow-up blood  culture and urine culture  -Follow-up a.m. WBC  -Hold IV fluid until further evaluation with echocardiogram given questionable pulmonary edema  -Follow-up echocardiogram  -Monitor vitals  -Monitor a.m. sodium level  -Continue IV Lasix  -Consider cardiology consult if indication of acute CHF is suspected on echocardiogram      Code Status:      I discussed the patients findings and my recommendations with     Estimated length of stay 3-4 days    Jennifer Wyman MD   08/15/17   6:51 AM             Electronically signed by Jennifer Wyman MD at 8/15/2017  7:33 AM        Hospital Medications (all)       Dose Frequency Start End    aspirin EC tablet 325 mg 325 mg Daily 8/15/2017     Sig - Route: Take 1 tablet by mouth Daily. - Oral    diltiaZEM CD (CARDIZEM CD) 24 hr capsule 240 mg 240 mg Every Early Morning 8/15/2017     Sig - Route: Take 1 capsule by mouth Every Morning. - Oral    furosemide (LASIX) injection 40 mg 40 mg Daily 8/15/2017     Sig - Route: Infuse 4 mL into a venous catheter Daily. - Intravenous    guaiFENesin (MUCINEX) 12 hr tablet 1,200 mg 1,200 mg Every 12 Hours Scheduled 8/15/2017     Sig - Route: Take 2 tablets by mouth Every 12 (Twelve) Hours. - Oral    heparin (porcine) 5000 UNIT/ML injection 5,000 Units 5,000 Units Every 8 Hours Scheduled 8/15/2017     Sig - Route: Inject 1 mL under the skin Every 8 (Eight) Hours. - Subcutaneous    ipratropium-albuterol (DUO-NEB) nebulizer solution 3 mL 3 mL Every 6 Hours - RT 8/15/2017     Sig - Route: Take 3 mL by nebulization Every 6 (Six) Hours. - Nebulization    levoFLOXacin (LEVAQUIN) 750 mg/150 mL D5W (premix) (LEVAQUIN) 750 mg 750 mg Every 24 Hours 8/15/2017     Sig - Route: Infuse 150 mL into a venous catheter Daily. - Intravenous    metoprolol tartrate (LOPRESSOR) tablet 150 mg 150 mg Every 12 Hours Scheduled 8/15/2017     Sig - Route: Take 150 mg by mouth Every 12 (Twelve) Hours. - Oral    perflutren (DEFINITY) 6.52 MG/ML lipid microspheres injection  " - ADS Override Pull   8/15/2017 8/15/2017    Notes to Pharmacy: Created by cabinet override    Pharmacy to dose vancomycin  Continuous PRN 8/15/2017     Sig - Route: Continuous As Needed for Consult. - Does not apply    Linked Group 1:  \"And\" Linked Group Details        piperacillin-tazobactam (ZOSYN) 4.5 g in iso-osmotic dextrose 100 mL IVPB (premix) 4.5 g Every 6 Hours 8/15/2017     Sig - Route: Infuse 100 mL into a venous catheter Every 6 (Six) Hours. - Intravenous    Linked Group 1:  \"And\" Linked Group Details        pneumococcal polysaccharide 23-valent (PNEUMOVAX-23) vaccine 0.5 mL 0.5 mL During Hospitalization 8/15/2017     Sig - Route: Inject 0.5 mL into the shoulder, thigh, or buttocks During Hospitalization for Immunization. - Intramuscular    QUEtiapine XR (SEROquel XR) 24 hr tablet 400 mg 400 mg Nightly 8/15/2017     Sig - Route: Take 2 tablets by mouth Every Night. - Oral    sodium chloride 0.9 % flush 1-10 mL 1-10 mL As Needed 8/15/2017     Sig - Route: Infuse 1-10 mL into a venous catheter As Needed for Line Care. - Intravenous    vancomycin (VANCOCIN) 2,000 mg in sodium chloride 0.9 % 500 mL IVPB 20 mg/kg × 101 kg Once 8/15/2017     Sig - Route: Infuse 2,000 mg into a venous catheter 1 (One) Time. - Intravenous    Linked Group 1:  \"And\" Linked Group Details        vancomycin 750 mg/250 mL 0.9% NS IVPB ([facility]) 750 mg Every 12 Hours 8/15/2017     Sig - Route: Infuse 250 mL into a venous catheter Every 12 (Twelve) Hours. - Intravenous    ipratropium-albuterol (DUO-NEB) nebulizer solution 3 mL (Discontinued) 3 mL Every 4 Hours PRN 8/15/2017 8/15/2017    Sig - Route: Take 3 mL by nebulization Every 4 (Four) Hours As Needed for Wheezing or Shortness of Air. - Nebulization    lisinopril (PRINIVIL,ZESTRIL) tablet 40 mg (Discontinued) 40 mg Daily 8/15/2017 8/15/2017    Sig - Route: Take 2 tablets by mouth Daily. - Oral          Physician Progress Notes (last 24 hours) (Notes from 8/14/2017 10:06 AM through " 8/15/2017 10:06 AM)     No notes of this type exist for this encounter.        Consult Notes (last 24 hours) (Notes from 8/14/2017 10:06 AM through 8/15/2017 10:06 AM)     No notes of this type exist for this encounter.

## 2017-08-15 NOTE — H&P
[facility] Emergency Medicine Services  HISTORY AND PHYSICAL    Date of Admission: 8/15/2017  Primary Care Physician: No Known Provider    Subjective     Chief Complaint: Shortness of breath    History of Present Illness  66-year-old male with past medical history of atrial fibrillation, CHF, hypertension, stroke was seen at [facility] with shortness of breath.  When EMS arrived at patient's home patient's O2 saturation was found to be 70%.  Patient is on 3 L oxygen at home.  After transfer to the ER patient was put on BiPAP.  In the ER patient was found to have a temperature of 103.5.  In the ER patient received a CT scan which was significant for pneumonia versus pulmonary edema.  Later patient's O2 saturation was noted to be 97% on BiPAP.  Patient received Zithromax and Rocephin in the ER.  Initially lactate was 2.1 later it was elevated to 2.5.  He is a regular patient at the [place] [place]  [facility] for further evaluation and treatment of pneumonia.  Patient was recently hospitalized about  for CHF exacerbation at the [place], [place].        Review of Systems     Otherwise complete ROS reviewed and negative except as mentioned in the HPI.      Past Medical History:   Past Medical History:   Diagnosis Date   • Atrial fibrillation    • CHF (congestive heart failure)    • Hypertension    • Stroke        Past Surgical History:No past surgical history on file.    Social History:  reports that he quit smoking about 15 months ago. He does not have any smokeless tobacco history on file. He reports that he does not drink alcohol or use illicit drugs.    Family History: family history includes Diabetes in his daughter.      Allergies:  No Known Allergies    Medications:  Prior to Admission medications    Medication Sig Start Date End Date Taking? Authorizing Provider   aspirin  MG EC tablet Take 1 tablet by mouth  "Daily. 10/2/16   Nasreen Morton,    diltiazem CD (CARDIZEM CD) 240 MG 24 hr capsule Take 240 mg by mouth Daily.    Historical Provider, MD   furosemide (LASIX) 40 MG tablet Take 40 mg by mouth 2 (Two) Times a Day.    Historical Provider, MD   lisinopril (PRINIVIL,ZESTRIL) 40 MG tablet Take 40 mg by mouth daily.    Historical Provider, MD   metoprolol tartrate (LOPRESSOR) 50 MG tablet Take 150 mg by mouth 2 (Two) Times a Day.    Historical Provider, MD   QUEtiapine XR (SEROquel XR) 400 MG 24 hr tablet Take 400 mg by mouth Every Night.    Historical Provider, MD       Objective     Vital Signs: /77  Pulse 87  Temp 97.9 °F (36.6 °C) (Temporal Artery )   Resp 20  Ht 67\" (170.2 cm)  Wt 221 lb 9.6 oz (101 kg)  SpO2 90%  BMI 34.71 kg/m2  Physical Exam   Constitutional: He appears well-developed and well-nourished.   HENT:   Head: Normocephalic and atraumatic.   Eyes: EOM are normal. Pupils are equal, round, and reactive to light.   Neck: Normal range of motion. Neck supple.   Cardiovascular: Normal rate and regular rhythm.    Pulmonary/Chest: He is in respiratory distress. He has wheezes. He has no rales. He exhibits no tenderness.   Abdominal: Soft. Bowel sounds are normal.   Musculoskeletal: Normal range of motion.   Neurological:   Altered mental status   Skin: Skin is warm and dry.   Psychiatric:   Unable to assess due to altered mental status           Results Reviewed:  Lab Results (last 24 hours)     Procedure Component Value Units Date/Time    Urine Culture [[id number]] Collected:  08/15/17 0023    Specimen:  Urine from Urine, Clean Catch Updated:  08/15/17 0029    Urinalysis With / Microscopic If Indicated [[id number]]  (Abnormal) Collected:  08/15/17 0023    Specimen:  Urine from Urine, Clean Catch Updated:  08/15/17 0032     Color, UA Yellow     Appearance, UA Clear     pH, UA <=5.0     Specific Gravity, UA >1.030 (H)     Glucose, UA Negative     Ketones, UA Negative     Bilirubin, UA Negative     " Blood, UA Negative     Protein, UA Negative     Leuk Esterase, UA Negative     Nitrite, UA Negative     Urobilinogen, UA 1.0 E.U./dL    Narrative:       Urine microscopic not indicated.    Blood Gas, Arterial [[id number]]  (Abnormal) Collected:  08/15/17 0226    Specimen:  Arterial Blood Updated:  08/15/17 0230     Site Arterial: right radial     Cristobal's Test --      Documented in Rapid Comm        pH, Arterial 7.286 (L) pH units      pCO2, Arterial 77.3 (C) mm Hg      pO2, Arterial 96.2 mm Hg      HCO3, Arterial 36.0 (H) mmol/L      Base Excess, Arterial 6.2 (H) mmol/L      O2 Saturation, Arterial 96.2 %      O2 Saturation Calculated 96.2 %      Barometric Pressure for Blood Gas -- mmHg       Component not reported at this site.        Modality BiPAP     FIO2 40 %      Rate 12.0 Breaths/minute      IPAP 18     EPAP 8     Notified [facility] [id number]     Notified By [id number]     Notified Time 8/15/2017 2:29:45 AM    Narrative:       Serial Number: [id number]    : [id number]    Blood Gas, Arterial [[id number]]  (Abnormal) Collected:  08/15/17 0331    Specimen:  Arterial Blood Updated:  08/15/17 0333     Site Arterial: right radial     Cristobal's Test --      Documented in Rapid Comm        pH, Arterial 7.372 pH units      pCO2, Arterial 60.2 (H) mm Hg      pO2, Arterial 57.7 (L) mm Hg      HCO3, Arterial 34.2 (H) mmol/L      Base Excess, Arterial 6.8 (H) mmol/L      O2 Saturation, Arterial 88.6 (L) %      O2 Saturation Calculated 88.6 (L) %      Barometric Pressure for Blood Gas -- mmHg       Component not reported at this site.        Modality BiPAP     FIO2 21 %      Rate 12.0 Breaths/minute      IPAP 20     EPAP 8    Narrative:       Serial Number: [id number]    : [id number]    Lactic Acid, Plasma [[id number]]  (Normal) Collected:  08/15/17 0523    Specimen:  Blood Updated:  08/15/17 0546     Lactate 1.5 mmol/L         Imaging Results (last 24 hours)     ** No results found for the last 24 hours. **          I have personally reviewed  and interpreted the radiology studies and ECG obtained at time of admission.     Assessment / Plan     Assessment & Plan    Assessment:    1.  Sepsis secondary to HCAP  2.  Leukocytosis  3.  Hypernatremia  4.  systolic heart failure-ejection fraction 35% in July 2017  5.  Atrial fibrillation  6.  Hypertension    Plan:    -Admit to CCU  -Continue cardiac monitoring  -Continuous pulse ox  -Continue BiPAP  -Follow-up pulmonology consult  -Follow-up ABG  -Continue IV antibiotics  -Follow-up lactate  -Follow-up ID consult  -Follow-up blood culture and urine culture  -Follow-up a.m. WBC  -Hold IV fluid until further evaluation with echocardiogram given questionable pulmonary edema  -Follow-up echocardiogram  -Monitor vitals  -Monitor a.m. sodium level  -Continue IV Lasix  -Consider cardiology consult if indication of acute CHF is suspected on echocardiogram      Code Status:      I discussed the patients findings and my recommendations with     Estimated length of stay 3-4 days    Jennifer Wyman MD   08/15/17   6:51 AM

## 2017-08-15 NOTE — PLAN OF CARE
Problem: Patient Care Overview (Adult)  Goal: Plan of Care Review  Outcome: Ongoing (interventions implemented as appropriate)    08/15/17 1517   Coping/Psychosocial Response Interventions   Plan Of Care Reviewed With patient   Patient Care Overview   Progress improving   Outcome Evaluation   Outcome Summary/Follow up Plan Pt remains Afebrile, alert & oriented. Oxygen improving from Bipap to 2 liters NC tolerating well. Lasix x1 with adequate urinary output noted.

## 2017-08-16 LAB
ANION GAP SERPL CALCULATED.3IONS-SCNC: 6 MMOL/L (ref 4–13)
ARTERIAL PATENCY WRIST A: ABNORMAL
ATMOSPHERIC PRESS: ABNORMAL MMHG
BASE EXCESS BLDA CALC-SCNC: 9.4 MMOL/L (ref -2–2)
BDY SITE: ABNORMAL
BUN BLD-MCNC: 16 MG/DL (ref 5–21)
BUN/CREAT SERPL: 15 (ref 7–25)
CALCIUM SPEC-SCNC: 8.1 MG/DL (ref 8.4–10.4)
CHLORIDE SERPL-SCNC: 101 MMOL/L (ref 98–110)
CO2 SERPL-SCNC: 38 MMOL/L (ref 24–31)
CREAT BLD-MCNC: 1.07 MG/DL (ref 0.5–1.4)
DEPRECATED RDW RBC AUTO: 48.8 FL (ref 40–54)
EPAP: 8
ERYTHROCYTE [DISTWIDTH] IN BLOOD BY AUTOMATED COUNT: 14.6 % (ref 12–15)
GFR SERPL CREATININE-BSD FRML MDRD: 69 ML/MIN/1.73
GLUCOSE BLD-MCNC: 104 MG/DL (ref 70–100)
HCO3 BLDA-SCNC: 36.5 MMOL/L (ref 22–26)
HCT VFR BLD AUTO: 30.9 % (ref 40–52)
HGB BLD-MCNC: 9.6 G/DL (ref 14–18)
HOROWITZ INDEX BLD+IHG-RTO: 22 %
IPAP: 20
MCH RBC QN AUTO: 28.5 PG (ref 28–32)
MCHC RBC AUTO-ENTMCNC: 31.1 G/DL (ref 33–36)
MCV RBC AUTO: 91.7 FL (ref 82–95)
MODALITY: ABNORMAL
PCO2 BLDA: 59.5 MM HG (ref 35–45)
PH BLDA: 7.41 PH UNITS (ref 7.35–7.45)
PLATELET # BLD AUTO: 114 10*3/MM3 (ref 130–400)
PMV BLD AUTO: 11 FL (ref 6–12)
PO2 BLDA: 58 MM HG (ref 80–100)
POTASSIUM BLD-SCNC: 3.6 MMOL/L (ref 3.5–5.3)
RBC # BLD AUTO: 3.37 10*6/MM3 (ref 4.8–5.9)
SAO2 % BLDCOA: 89.6 % (ref 94–100)
SAO2 % BLDCOA: 89.6 % (ref 94–100)
SODIUM BLD-SCNC: 145 MMOL/L (ref 135–145)
WBC NRBC COR # BLD: 6.96 10*3/MM3 (ref 4.8–10.8)

## 2017-08-16 PROCEDURE — 25010000002 FUROSEMIDE PER 20 MG: Performed by: INTERNAL MEDICINE

## 2017-08-16 PROCEDURE — 93005 ELECTROCARDIOGRAM TRACING: CPT | Performed by: FAMILY MEDICINE

## 2017-08-16 PROCEDURE — 94799 UNLISTED PULMONARY SVC/PX: CPT

## 2017-08-16 PROCEDURE — 93010 ELECTROCARDIOGRAM REPORT: CPT | Performed by: INTERNAL MEDICINE

## 2017-08-16 PROCEDURE — 82803 BLOOD GASES ANY COMBINATION: CPT

## 2017-08-16 PROCEDURE — 25010000002 PIPERACILLIN SOD-TAZOBACTAM PER 1 G: Performed by: FAMILY MEDICINE

## 2017-08-16 PROCEDURE — 36600 WITHDRAWAL OF ARTERIAL BLOOD: CPT

## 2017-08-16 PROCEDURE — 80048 BASIC METABOLIC PNL TOTAL CA: CPT | Performed by: INTERNAL MEDICINE

## 2017-08-16 PROCEDURE — 25010000002 VANCOMYCIN 10 G RECONSTITUTED SOLUTION

## 2017-08-16 PROCEDURE — 25010000002 ENOXAPARIN PER 10 MG: Performed by: INTERNAL MEDICINE

## 2017-08-16 PROCEDURE — 85027 COMPLETE CBC AUTOMATED: CPT | Performed by: INTERNAL MEDICINE

## 2017-08-16 PROCEDURE — 94660 CPAP INITIATION&MGMT: CPT

## 2017-08-16 RX ORDER — ONDANSETRON 2 MG/ML
4 INJECTION INTRAMUSCULAR; INTRAVENOUS EVERY 6 HOURS PRN
Status: DISCONTINUED | OUTPATIENT
Start: 2017-08-16 | End: 2017-08-19 | Stop reason: HOSPADM

## 2017-08-16 RX ORDER — ACETAMINOPHEN 325 MG/1
650 TABLET ORAL EVERY 6 HOURS PRN
Status: DISCONTINUED | OUTPATIENT
Start: 2017-08-16 | End: 2017-08-19 | Stop reason: HOSPADM

## 2017-08-16 RX ORDER — FUROSEMIDE 10 MG/ML
40 INJECTION INTRAMUSCULAR; INTRAVENOUS EVERY 12 HOURS
Status: DISCONTINUED | OUTPATIENT
Start: 2017-08-16 | End: 2017-08-18

## 2017-08-16 RX ORDER — LEVOFLOXACIN 750 MG/1
750 TABLET ORAL
Status: DISCONTINUED | OUTPATIENT
Start: 2017-08-16 | End: 2017-08-19 | Stop reason: HOSPADM

## 2017-08-16 RX ADMIN — ASPIRIN 81 MG: 81 TABLET ORAL at 08:27

## 2017-08-16 RX ADMIN — IPRATROPIUM BROMIDE AND ALBUTEROL SULFATE 3 ML: .5; 3 SOLUTION RESPIRATORY (INHALATION) at 12:00

## 2017-08-16 RX ADMIN — TAZOBACTAM SODIUM AND PIPERACILLIN SODIUM 4.5 G: 500; 4 INJECTION, SOLUTION INTRAVENOUS at 07:49

## 2017-08-16 RX ADMIN — IPRATROPIUM BROMIDE AND ALBUTEROL SULFATE 3 ML: .5; 3 SOLUTION RESPIRATORY (INHALATION) at 19:11

## 2017-08-16 RX ADMIN — METOPROLOL SUCCINATE 100 MG: 100 TABLET, FILM COATED, EXTENDED RELEASE ORAL at 08:27

## 2017-08-16 RX ADMIN — GUAIFENESIN 1200 MG: 600 TABLET, EXTENDED RELEASE ORAL at 23:05

## 2017-08-16 RX ADMIN — QUETIAPINE FUMARATE 400 MG: 200 TABLET, FILM COATED ORAL at 23:07

## 2017-08-16 RX ADMIN — IPRATROPIUM BROMIDE AND ALBUTEROL SULFATE 3 ML: .5; 3 SOLUTION RESPIRATORY (INHALATION) at 07:04

## 2017-08-16 RX ADMIN — LEVOFLOXACIN 750 MG: 750 TABLET, FILM COATED ORAL at 08:56

## 2017-08-16 RX ADMIN — IPRATROPIUM BROMIDE AND ALBUTEROL SULFATE 3 ML: .5; 3 SOLUTION RESPIRATORY (INHALATION) at 00:28

## 2017-08-16 RX ADMIN — ENOXAPARIN SODIUM 40 MG: 40 INJECTION SUBCUTANEOUS at 08:56

## 2017-08-16 RX ADMIN — DILTIAZEM HYDROCHLORIDE 180 MG: 180 CAPSULE, EXTENDED RELEASE ORAL at 08:27

## 2017-08-16 RX ADMIN — GUAIFENESIN 1200 MG: 600 TABLET, EXTENDED RELEASE ORAL at 08:27

## 2017-08-16 RX ADMIN — TAZOBACTAM SODIUM AND PIPERACILLIN SODIUM 4.5 G: 500; 4 INJECTION, SOLUTION INTRAVENOUS at 15:20

## 2017-08-16 RX ADMIN — TAZOBACTAM SODIUM AND PIPERACILLIN SODIUM 4.5 G: 500; 4 INJECTION, SOLUTION INTRAVENOUS at 03:23

## 2017-08-16 RX ADMIN — TAZOBACTAM SODIUM AND PIPERACILLIN SODIUM 4.5 G: 500; 4 INJECTION, SOLUTION INTRAVENOUS at 23:12

## 2017-08-16 RX ADMIN — FUROSEMIDE 40 MG: 10 INJECTION, SOLUTION INTRAMUSCULAR; INTRAVENOUS at 08:27

## 2017-08-16 RX ADMIN — SODIUM CHLORIDE 750 MG: 900 INJECTION, SOLUTION INTRAVENOUS at 08:27

## 2017-08-16 RX ADMIN — METOPROLOL SUCCINATE 100 MG: 100 TABLET, FILM COATED, EXTENDED RELEASE ORAL at 23:05

## 2017-08-16 NOTE — PROGRESS NOTES
[facility] Medicine Services  INPATIENT PROGRESS NOTE    Length of Stay: 1  Date of Admission: 8/15/2017  Primary Care Physician: No Known Provider    Subjective   Chief Complaint: shortness of breath  HPI   Apparently he was up all night and at one point in time was combative with the nurses.  He wanted a Delgadillo catheter and they would not put one in so he threw his urinal at the night nurse.    He now wants to sleep in the left lung.  No new complaints.    Review of Systems   All pertinent negatives and positives are as above. All other systems have been reviewed and are negative unless otherwise stated.     Objective    Temp:  [97.4 °F (36.3 °C)-97.8 °F (36.6 °C)] 97.4 °F (36.3 °C)  Heart Rate:  [78-92] 87  Resp:  [16-23] 19  BP: (108-140)/(57-83) 124/72  Physical Exam   Constitutional:   Chronically ill-appearing.  On BiPAP.  Seen and discussed with his nurse, Almita.   HENT:   Head: Normocephalic and atraumatic.   Eyes: Conjunctivae and EOM are normal. Pupils are equal, round, and reactive to light.   Neck: Neck supple. No JVD present.   Cardiovascular: Normal rate, regular rhythm, normal heart sounds and intact distal pulses.  Exam reveals no gallop and no friction rub.    No murmur heard.  Pulmonary/Chest: Effort normal and breath sounds normal. No respiratory distress. He has no wheezes. He has no rales. He exhibits no tenderness.   Diminished at the bases, but clear.   Abdominal: Soft. Bowel sounds are normal. He exhibits no distension. There is no tenderness. There is no rebound and no guarding.   Musculoskeletal: Normal range of motion. He exhibits no edema, tenderness or deformity.   Neurological: He is alert. He displays normal reflexes. No cranial nerve deficit. He exhibits normal muscle tone.   Skin: Skin is warm and dry. No rash noted.   Psychiatric:   Withdraws to painful stimuli equally in all extremities.  Mumbles and appears to not want to be bothered.        Intake/Output Summary (Last 24 hours) at 08/16/17 0730  Last data filed at 08/16/17 0000   Gross per 24 hour   Intake             1800 ml   Output             1560 ml   Net              240 ml     Results Review:  I have reviewed the labs, radiology results, and diagnostic studies.    Laboratory Data:     Results from last 7 days  Lab Units 08/16/17  0240 08/15/17  0851   WBC 10*3/mm3 6.96 9.72   HEMOGLOBIN g/dL 9.6* 9.8*   HEMATOCRIT % 30.9* 31.5*   PLATELETS 10*3/mm3 114* 129*       Results from last 7 days  Lab Units 08/16/17  0241 08/15/17  0851   SODIUM mmol/L 145 145   POTASSIUM mmol/L 3.6 3.7   CHLORIDE mmol/L 101 102   CO2 mmol/L 38.0* 34.0*   BUN mg/dL 16 17   CREATININE mg/dL 1.07 1.21   CALCIUM mg/dL 8.1* 8.2*   BILIRUBIN mg/dL  --  0.5   ALK PHOS U/L  --  48   ALT (SGPT) U/L  --  27   AST (SGOT) U/L  --  15   GLUCOSE mg/dL 104* 95     Culture Data:   Blood Culture   Date Value Ref Range Status   08/15/2017 No growth at less than 24 hours  Preliminary   08/15/2017 No growth at less than 24 hours  Preliminary     Urine Culture   Date Value Ref Range Status   08/15/2017 No growth at 24 hours  Preliminary   08/15/2017 No growth at 24 hours  Preliminary     Radiology Data:   Imaging Results (last 24 hours)     Procedure Component Value Units Date/Time    XR Chest 1 View [[id number]] Collected:  08/15/17 0820     Updated:  08/15/17 0824    Narrative:       Exam:   XR CHEST 1 VW-       Date:  08/15/2017      History:  Male, age  66 years;hypoxia     COMPARISON:  None.     Findings :     The heart and mediastinum are borderline in size which may be partially  due to low lung volumes as well as technique. Low lung volumes.. Lungs  are without focal infiltrate, mass or effusions.  The bones show no  acute pathology.         Impression:       Impression:     Mild prominence of cardiac mediastinal silhouette, with mild prominence  of the central pulmonary vasculature, concerning for mild/early  fluid  overload.     This report was finalized on 08/15/2017 08:21 by Dr. Lu Howell MD.          Results from last 7 days  Lab Units 08/16/17  0337   PH, ARTERIAL pH units 7.406   PO2 ART mm Hg 58.0*   PCO2, ARTERIAL mm Hg 59.5*   HCO3 ART mmol/L 36.5*     I have reviewed the patient current medications.     Assessment/Plan   Assessment:   1.  Acute on chronic hypoxic and hypercarbic respiratory failure.  2.  Healthcare associated pneumonia.  3.  Atrial fibrillation, not on chronic anticoagulation.   4.  Acute on chronic systolic heart failure with ejection fraction 40-45%.  5.  Coronary artery disease, exact details unknown, nothing specific in [facility] notes.  6.  Systemic arterial hypertension.  7.  Chronic kidney disease, stage III.  8.  Remote tobacco abuse with likely underlying chronic obstructive pulmonary disease.  9.  History of polysubstance abuse.  10.  Bipolar disorder.  11.  Normocytic anemia.  12.  Thrombocytopenia, mild, chronic.    Plan:   Continue BiPAP as needed.  Wean oxygen as tolerated.    Continue broad-spectrum antibiotics.  Change Levaquin to oral today.  De-escalate vancomycin tomorrow if no growth.  Mucinex, inhaled bronchodilators.    Start more aggressive diuresis by changing Lasix to 40 mg IV twice per day.  Monitor electrolytes and renal function with doing so.  Strict intake and output.    Continue rate controlling medications and aspirin.    Lovenox for DVT prophylaxis.    Remain in the CCU for now.    Clint Torres,    08/16/17   7:29 AM

## 2017-08-16 NOTE — PLAN OF CARE
Problem: Patient Care Overview (Adult)  Goal: Plan of Care Review  Outcome: Ongoing (interventions implemented as appropriate)    08/16/17 5651   Coping/Psychosocial Response Interventions   Plan Of Care Reviewed With patient;family   Outcome Evaluation   Outcome Summary/Follow up Plan Continues to be Afebrile, tolerating NC @2liters well. Up to chair for meals and ambulating to commode for BMs. External Cath placed for continued incontinent episodes. Pt has been very drowsy today yet remains A&Ox4.

## 2017-08-16 NOTE — PROGRESS NOTES
PULMONARY AND CRITICAL CARE PROGRESS NOTE - [facility]    Patient: Froy Hoyos  1950   MR# [id number]   Acct# [id number]  08/16/17   8:17 AM  Referring Provider: Clint Torres DO    Chief Complaint: dyspnea     Interval history: Patient is resting in bed on BiPAP 20/8, FiO2 22%, O2 sat 89%.  Per EMR he did not sleep well last night and was combative to the nurses.  He appears sleepy this AM and awakens to noxious stimuli.  No other aggravating or allevitating factors.     Meds:    aspirin 81 mg Oral Daily   diltiaZEM  mg Oral Q24H   enoxaparin 40 mg Subcutaneous Q24H   furosemide 40 mg Intravenous Q12H   guaiFENesin 1,200 mg Oral Q12H   ipratropium-albuterol 3 mL Nebulization Q6H - RT   levoFLOXacin 750 mg Oral Q24H   metoprolol succinate  mg Oral Q12H   piperacillin-tazobactam 4.5 g Intravenous Q6H   QUEtiapine 400 mg Oral Nightly   vancomycin 750 mg Intravenous Q12H       Pharmacy to dose vancomycin      Review of Systems:   Review of Systems   Unable to perform ROS: Other    not verbally responsive     Physical Exam:  SpO2 Readings from Last 3 Encounters:   08/16/17 93%   10/02/16 93%   09/26/16 96%     Temp:  [97.4 °F (36.3 °C)-97.8 °F (36.6 °C)] 97.7 °F (36.5 °C)  Heart Rate:  [79-95] 95  Resp:  [16-23] 20  BP: (108-140)/(57-85) 111/85  Intake/Output Summary (Last 24 hours) at 08/16/17 0817  Last data filed at 08/16/17 0800   Gross per 24 hour   Intake             2140 ml   Output             1835 ml   Net              305 ml     Physical Exam   Constitutional: He appears well-developed and well-nourished. He is sleeping. No distress. Face mask in place.   HENT:   Head: Normocephalic and atraumatic.   Eyes: Conjunctivae and EOM are normal. Pupils are equal, round, and reactive to light. No scleral icterus.   Neck: Normal range of motion. Neck supple.   Cardiovascular: Normal rate, regular rhythm and normal heart sounds.  Exam reveals no friction rub.    No murmur  heard.  Pulmonary/Chest: Effort normal. No respiratory distress. He has decreased breath sounds. He has no wheezes. He has no rales.   Abdominal: Soft. Bowel sounds are normal. He exhibits no distension. There is no tenderness.   Musculoskeletal: Normal range of motion. He exhibits no edema.   Skin: Skin is warm and dry.   Psychiatric: He has a normal mood and affect. His behavior is normal.   Nursing note and vitals reviewed.    Laboratory Data:    Results from last 7 days  Lab Units 08/16/17  0240 08/15/17  0851   WBC 10*3/mm3 6.96 9.72   HEMOGLOBIN g/dL 9.6* 9.8*   PLATELETS 10*3/mm3 114* 129*       Results from last 7 days  Lab Units 08/16/17  0241 08/15/17  0851   SODIUM mmol/L 145 145   POTASSIUM mmol/L 3.6 3.7   BUN mg/dL 16 17   CREATININE mg/dL 1.07 1.21       Results from last 7 days  Lab Units 08/16/17  0337 08/15/17  0331 08/15/17  0226   PH, ARTERIAL pH units 7.406 7.372 7.286*   PCO2, ARTERIAL mm Hg 59.5* 60.2* 77.3*   PO2 ART mm Hg 58.0* 57.7* 96.2   FIO2 % 22 21 40     Blood Culture   Date Value Ref Range Status   08/15/2017 No growth at less than 24 hours  Preliminary   08/15/2017 No growth at less than 24 hours  Preliminary     Urine Culture   Date Value Ref Range Status   08/15/2017 No growth at 24 hours  Preliminary   08/15/2017 No growth at 24 hours  Preliminary     Pulmonary Assessment:  1. Acute/chronic hypercapnic/hypoxemic respiratory failure  2. Fever  3. Systolic heart failure  4. Volume overload  5. Anemia  6. Atrial fibrillation  7. Hypertension  8. CVA     Recommend:   · Continue BiPAP for O2 sat 88%-92%  · Zosyn/vancomycin d2, Levaquin PO d1  · Continue duonebs/mucinex/IS     Electronically signed by KEERTHI Hodges, 08/16/17, 8:17 AM     Physician substantive contribution:  Pertinent symptoms/interval history include: still hypoxic despite nppv, supplemental oxygen.  Sleeping but arousible but not verbal  Respiratory exam shows pertinent findings of:diminished breath sounds,  wheezing, crackles.  Plan includes: continue bipap and transition to nasal cannulae while awake as tolerated.broad spectrum abx, follow up microbiology.  abg much better with respect to pH, but still hypoxemic.  Repeat abg in am, repeat cxr.  I have seen and examined patient personally, performing a face-to-face diagnostic evaluation with plan of care reviewed and developed with APRN and nursing staff. I have addended and/or modified the above history of present illness, physical examination, and assessment and plan to reflect my findings and impressions. Essential elements of the care plan were discussed with APRN above.  Agree with findings and assessment/plan as documented above.    Electronically signed by Ronald Recio MD, on 8/16/2017, 7:18 PM

## 2017-08-17 ENCOUNTER — APPOINTMENT (OUTPATIENT)
Dept: GENERAL RADIOLOGY | Facility: HOSPITAL | Age: 67
End: 2017-08-17

## 2017-08-17 LAB
ANION GAP SERPL CALCULATED.3IONS-SCNC: 7 MMOL/L (ref 4–13)
ARTERIAL PATENCY WRIST A: ABNORMAL
ATMOSPHERIC PRESS: ABNORMAL MMHG
BACTERIA SPEC AEROBE CULT: NORMAL
BACTERIA SPEC AEROBE CULT: NORMAL
BASE EXCESS BLDA CALC-SCNC: 11.8 MMOL/L (ref -2–2)
BDY SITE: ABNORMAL
BUN BLD-MCNC: 13 MG/DL (ref 5–21)
BUN/CREAT SERPL: 11.9 (ref 7–25)
CALCIUM SPEC-SCNC: 8.5 MG/DL (ref 8.4–10.4)
CHLORIDE SERPL-SCNC: 105 MMOL/L (ref 98–110)
CO2 SERPL-SCNC: 33 MMOL/L (ref 24–31)
COHGB MFR BLD: 0.7 % (ref 0–5.1)
CREAT BLD-MCNC: 1.09 MG/DL (ref 0.5–1.4)
GAS FLOW AIRWAY: 2 LPM
GFR SERPL CREATININE-BSD FRML MDRD: 67 ML/MIN/1.73
GLUCOSE BLD-MCNC: 94 MG/DL (ref 70–100)
HCO3 BLDA-SCNC: 38.8 MMOL/L (ref 22–26)
HCT VFR BLD CALC: 32 % (ref 38–51)
HGB BLDA-MCNC: 10.8 G/DL (ref 14–18)
METHGB BLD QL: 0.3 % (ref 0.4–1.5)
MODALITY: ABNORMAL
OXYHGB MFR BLDV: 87 % (ref 94–97)
PCO2 BLDA: 64.7 MM HG (ref 35–45)
PH BLDA: 7.4 PH UNITS (ref 7.35–7.45)
PO2 BLDA: 54.2 MM HG (ref 80–100)
POTASSIUM BLD-SCNC: 4.7 MMOL/L (ref 3.5–5.3)
POTASSIUM BLDA-SCNC: 3.71 MMOL/L (ref 3.5–5)
SODIUM BLD-SCNC: 145 MMOL/L (ref 135–145)
SODIUM BLDA-SCNC: 138.6 MMOL/L (ref 135–145)

## 2017-08-17 PROCEDURE — 97166 OT EVAL MOD COMPLEX 45 MIN: CPT

## 2017-08-17 PROCEDURE — 94799 UNLISTED PULMONARY SVC/PX: CPT

## 2017-08-17 PROCEDURE — 25010000002 FUROSEMIDE PER 20 MG: Performed by: INTERNAL MEDICINE

## 2017-08-17 PROCEDURE — G8978 MOBILITY CURRENT STATUS: HCPCS

## 2017-08-17 PROCEDURE — 25010000002 PIPERACILLIN SOD-TAZOBACTAM PER 1 G: Performed by: FAMILY MEDICINE

## 2017-08-17 PROCEDURE — 25010000002 VANCOMYCIN 10 G RECONSTITUTED SOLUTION

## 2017-08-17 PROCEDURE — 83050 HGB METHEMOGLOBIN QUAN: CPT

## 2017-08-17 PROCEDURE — 97162 PT EVAL MOD COMPLEX 30 MIN: CPT

## 2017-08-17 PROCEDURE — G8987 SELF CARE CURRENT STATUS: HCPCS

## 2017-08-17 PROCEDURE — 80048 BASIC METABOLIC PNL TOTAL CA: CPT | Performed by: INTERNAL MEDICINE

## 2017-08-17 PROCEDURE — G8979 MOBILITY GOAL STATUS: HCPCS

## 2017-08-17 PROCEDURE — G8988 SELF CARE GOAL STATUS: HCPCS

## 2017-08-17 PROCEDURE — 36600 WITHDRAWAL OF ARTERIAL BLOOD: CPT

## 2017-08-17 PROCEDURE — 71010 HC CHEST PA OR AP: CPT

## 2017-08-17 PROCEDURE — 82805 BLOOD GASES W/O2 SATURATION: CPT

## 2017-08-17 PROCEDURE — 82375 ASSAY CARBOXYHB QUANT: CPT

## 2017-08-17 RX ORDER — METOPROLOL TARTRATE 5 MG/5ML
5 INJECTION INTRAVENOUS ONCE
Status: COMPLETED | OUTPATIENT
Start: 2017-08-17 | End: 2017-08-17

## 2017-08-17 RX ORDER — LEVALBUTEROL 1.25 MG/.5ML
1.25 SOLUTION, CONCENTRATE RESPIRATORY (INHALATION)
Status: DISCONTINUED | OUTPATIENT
Start: 2017-08-17 | End: 2017-08-19 | Stop reason: HOSPADM

## 2017-08-17 RX ADMIN — IPRATROPIUM BROMIDE 0.5 MG: 0.5 SOLUTION RESPIRATORY (INHALATION) at 12:28

## 2017-08-17 RX ADMIN — FUROSEMIDE 40 MG: 10 INJECTION, SOLUTION INTRAMUSCULAR; INTRAVENOUS at 07:30

## 2017-08-17 RX ADMIN — SODIUM CHLORIDE 750 MG: 900 INJECTION, SOLUTION INTRAVENOUS at 01:17

## 2017-08-17 RX ADMIN — QUETIAPINE FUMARATE 400 MG: 200 TABLET, FILM COATED ORAL at 22:27

## 2017-08-17 RX ADMIN — DILTIAZEM HYDROCHLORIDE 180 MG: 180 CAPSULE, EXTENDED RELEASE ORAL at 08:24

## 2017-08-17 RX ADMIN — TAZOBACTAM SODIUM AND PIPERACILLIN SODIUM 4.5 G: 500; 4 INJECTION, SOLUTION INTRAVENOUS at 20:55

## 2017-08-17 RX ADMIN — FUROSEMIDE 40 MG: 10 INJECTION, SOLUTION INTRAMUSCULAR; INTRAVENOUS at 20:55

## 2017-08-17 RX ADMIN — LEVOFLOXACIN 750 MG: 750 TABLET, FILM COATED ORAL at 08:24

## 2017-08-17 RX ADMIN — IPRATROPIUM BROMIDE 0.5 MG: 0.5 SOLUTION RESPIRATORY (INHALATION) at 07:05

## 2017-08-17 RX ADMIN — ASPIRIN 81 MG: 81 TABLET ORAL at 08:24

## 2017-08-17 RX ADMIN — LEVALBUTEROL HYDROCHLORIDE 1.25 MG: 1.25 SOLUTION, CONCENTRATE RESPIRATORY (INHALATION) at 07:05

## 2017-08-17 RX ADMIN — METOPROLOL SUCCINATE 100 MG: 100 TABLET, FILM COATED, EXTENDED RELEASE ORAL at 08:24

## 2017-08-17 RX ADMIN — GUAIFENESIN 1200 MG: 600 TABLET, EXTENDED RELEASE ORAL at 22:28

## 2017-08-17 RX ADMIN — TAZOBACTAM SODIUM AND PIPERACILLIN SODIUM 4.5 G: 500; 4 INJECTION, SOLUTION INTRAVENOUS at 07:28

## 2017-08-17 RX ADMIN — IPRATROPIUM BROMIDE 0.5 MG: 0.5 SOLUTION RESPIRATORY (INHALATION) at 19:17

## 2017-08-17 RX ADMIN — METOPROLOL TARTRATE 5 MG: 5 INJECTION INTRAVENOUS at 01:18

## 2017-08-17 RX ADMIN — SODIUM CHLORIDE 750 MG: 900 INJECTION, SOLUTION INTRAVENOUS at 08:24

## 2017-08-17 RX ADMIN — GUAIFENESIN 1200 MG: 600 TABLET, EXTENDED RELEASE ORAL at 08:24

## 2017-08-17 RX ADMIN — LEVALBUTEROL HYDROCHLORIDE 1.25 MG: 1.25 SOLUTION, CONCENTRATE RESPIRATORY (INHALATION) at 12:28

## 2017-08-17 RX ADMIN — TAZOBACTAM SODIUM AND PIPERACILLIN SODIUM 4.5 G: 500; 4 INJECTION, SOLUTION INTRAVENOUS at 12:58

## 2017-08-17 RX ADMIN — METOPROLOL SUCCINATE 100 MG: 100 TABLET, FILM COATED, EXTENDED RELEASE ORAL at 22:27

## 2017-08-17 RX ADMIN — LEVALBUTEROL HYDROCHLORIDE 1.25 MG: 1.25 SOLUTION, CONCENTRATE RESPIRATORY (INHALATION) at 19:17

## 2017-08-17 RX ADMIN — IPRATROPIUM BROMIDE AND ALBUTEROL SULFATE 3 ML: .5; 3 SOLUTION RESPIRATORY (INHALATION) at 00:12

## 2017-08-17 NOTE — PAYOR COMM NOTE
"[facility]    [place],  [phone]  OR   FAX   [phone]    Braxton Hoyos (66 y.o. Male)     Date of Birth Social Security Number Address Home Phone MRN    1950  [address] [place] [address] [phone] [id number]    Alevism Marital Status          None        Admission Date Admission Type Admitting Provider Attending Provider Department, Room/Bed    8/15/17 Urgent Clint Torres DO Hancock, John C, DO [facility] CARDIAC CARE, C005/1    Discharge Date Discharge Disposition Discharge Destination                      Attending Provider: Clint Torres DO     Allergies:  No Known Allergies    Isolation:  None   Infection:  None   Code Status:  FULL    Ht:  67\" (170.2 cm)   Wt:  230 lb 5 oz (104 kg)    Admission Cmt:  None   Principal Problem:  None                Active Insurance as of 8/15/2017     Primary Coverage     Payor Plan Insurance Group Employer/Plan Group    [employer] [id number]      Payor Plan Address Payor Plan Phone Number Effective From Effective To    [employer] [phone] 8/15/2017     [address] [place] [address]       Subscriber Name Subscriber Birth Date Member ID       BRAXTON HOYOS 1950 [id number]                 Emergency Contacts      (Rel.) Home Phone Work Phone Mobile Phone    Roma Liu/Braxton (Son) [phone] -- --    Alma Rosa Hoyos (Daughter) -- -- [phone]           08/17/17 0537   Coping/Psychosocial Response Interventions   Plan Of Care Reviewed With patient   Outcome Evaluation   Outcome Summary/Follow up Plan Pt has worn bipap this shift . Pt has had increased hr 180's bp 150-160 systolic . Dr. Wyman called and metotorpol ordered hr and bp better in control Pt is slert and orienteed     Almita England, RN Registered Nurse Signed  Plan of Care Date of Service: 8/16/2017  4:27 PM         Problem: Patient Care Overview (Adult)  Goal: Plan of Care Review  Outcome: " Ongoing (interventions implemented as appropriate)     08/16/17 5686   Coping/Psychosocial Response Interventions   Plan Of Care Reviewed With patient;family   Outcome Evaluation   Outcome Summary/Follow up Plan Continues to be Afebrile, tolerating NC @2liters well. Up to chair for meals and ambulating to commode for BMs. External Cath placed for continued incontinent episodes. Pt has been very drowsy today yet remains                   Physician Progress Notes (last 72 hours) (Notes from 8/14/2017  8:49 AM through 8/17/2017  8:49 AM)      Clint Torres, DO at 8/16/2017  7:29 AM  Version 1 of 1             [facility]  INPATIENT PROGRESS NOTE    Length of Stay: 1  Date of Admission: 8/15/2017  Primary Care Physician: No Known Provider    Subjective   Chief Complaint: shortness of breath  HPI   Apparently he was up all night and at one point in time was combative with the nurses.  He wanted a Delgadillo catheter and they would not put one in so he threw his urinal at the night nurse.    He now wants to sleep in the left lung.  No new complaints.    Review of Systems   All pertinent negatives and positives are as above. All other systems have been reviewed and are negative unless otherwise stated.     Objective    Temp:  [97.4 °F (36.3 °C)-97.8 °F (36.6 °C)] 97.4 °F (36.3 °C)  Heart Rate:  [78-92] 87  Resp:  [16-23] 19  BP: (108-140)/(57-83) 124/72  Physical Exam   Constitutional:   Chronically ill-appearing.  On BiPAP.  Seen and discussed with his nurse, Almita.   HENT:   Head: Normocephalic and atraumatic.   Eyes: Conjunctivae and EOM are normal. Pupils are equal, round, and reactive to light.   Neck: Neck supple. No JVD present.   Cardiovascular: Normal rate, regular rhythm, normal heart sounds and intact distal pulses.  Exam reveals no gallop and no friction rub.    No murmur heard.  Pulmonary/Chest: Effort normal and breath sounds normal. No respiratory distress. He  has no wheezes. He has no rales. He exhibits no tenderness.   Diminished at the bases, but clear.   Abdominal: Soft. Bowel sounds are normal. He exhibits no distension. There is no tenderness. There is no rebound and no guarding.   Musculoskeletal: Normal range of motion. He exhibits no edema, tenderness or deformity.   Neurological: He is alert. He displays normal reflexes. No cranial nerve deficit. He exhibits normal muscle tone.   Skin: Skin is warm and dry. No rash noted.   Psychiatric:   Withdraws to painful stimuli equally in all extremities.  Mumbles and appears to not want to be bothered.       Intake/Output Summary (Last 24 hours) at 08/16/17 0730  Last data filed at 08/16/17 0000   Gross per 24 hour   Intake             1800 ml   Output             1560 ml   Net              240 ml     Results Review:  I have reviewed the labs, radiology results, and diagnostic studies.    Laboratory Data:     Results from last 7 days  Lab Units 08/16/17  0240 08/15/17  0851   WBC 10*3/mm3 6.96 9.72   HEMOGLOBIN g/dL 9.6* 9.8*   HEMATOCRIT % 30.9* 31.5*   PLATELETS 10*3/mm3 114* 129*       Results from last 7 days  Lab Units 08/16/17  0241 08/15/17  0851   SODIUM mmol/L 145 145   POTASSIUM mmol/L 3.6 3.7   CHLORIDE mmol/L 101 102   CO2 mmol/L 38.0* 34.0*   BUN mg/dL 16 17   CREATININE mg/dL 1.07 1.21   CALCIUM mg/dL 8.1* 8.2*   BILIRUBIN mg/dL  --  0.5   ALK PHOS U/L  --  48   ALT (SGPT) U/L  --  27   AST (SGOT) U/L  --  15   GLUCOSE mg/dL 104* 95     Culture Data:   Blood Culture   Date Value Ref Range Status   08/15/2017 No growth at less than 24 hours  Preliminary   08/15/2017 No growth at less than 24 hours  Preliminary     Urine Culture   Date Value Ref Range Status   08/15/2017 No growth at 24 hours  Preliminary   08/15/2017 No growth at 24 hours  Preliminary     Radiology Data:   Imaging Results (last 24 hours)     Procedure Component Value Units Date/Time    XR Chest 1 View [[id number]] Collected:  08/15/17 0820      Updated:  08/15/17 0824    Narrative:       Exam:   XR CHEST 1 VW-       Date:  08/15/2017      History:  Male, age  66 years;hypoxia     COMPARISON:  None.     Findings :     The heart and mediastinum are borderline in size which may be partially  due to low lung volumes as well as technique. Low lung volumes.. Lungs  are without focal infiltrate, mass or effusions.  The bones show no  acute pathology.         Impression:       Impression:     Mild prominence of cardiac mediastinal silhouette, with mild prominence  of the central pulmonary vasculature, concerning for mild/early fluid  overload.     This report was finalized on 08/15/2017 08:21 by Dr. Lu Howell MD.          Results from last 7 days  Lab Units 08/16/17  0337   PH, ARTERIAL pH units 7.406   PO2 ART mm Hg 58.0*   PCO2, ARTERIAL mm Hg 59.5*   HCO3 ART mmol/L 36.5*     I have reviewed the patient current medications.     Assessment/Plan   Assessment:   1.  Acute on chronic hypoxic and hypercarbic respiratory failure.  2.  Healthcare associated pneumonia.  3.  Atrial fibrillation, not on chronic anticoagulation.   4.  Acute on chronic systolic heart failure with ejection fraction 40-45%.  5.  Coronary artery disease, exact details unknown, nothing specific in [facility] notes.  6.  Systemic arterial hypertension.  7.  Chronic kidney disease, stage III.  8.  Remote tobacco abuse with likely underlying chronic obstructive pulmonary disease.  9.  History of polysubstance abuse.  10.  Bipolar disorder.  11.  Normocytic anemia.  12.  Thrombocytopenia, mild, chronic.    Plan:   Continue BiPAP as needed.  Wean oxygen as tolerated.    Continue broad-spectrum antibiotics.  Change Levaquin to oral today.  De-escalate vancomycin tomorrow if no growth.  Mucinex, inhaled bronchodilators.    Start more aggressive diuresis by changing Lasix to 40 mg IV twice per day.  Monitor electrolytes and renal function with doing so.  Strict intake and output.    Continue rate  controlling medications and aspirin.    Lovenox for DVT prophylaxis.    Remain in the CCU for now.    Clint Torres DO   08/16/17   7:29 AM       Electronically signed by Clint Torres DO at 8/16/2017  7:55 AM      Ronald Recio MD at 8/16/2017  8:16 AM  Version 2 of 2             PULMONARY AND CRITICAL CARE PROGRESS NOTE - [facility]    Patient: Froy Hoyos  1950   MR# [id number]   Acct# [id number]  08/16/17   8:17 AM  Referring Provider: Clint Torres DO    Chief Complaint: dyspnea     Interval history: Patient is resting in bed on BiPAP 20/8, FiO2 22%, O2 sat 89%.  Per EMR he did not sleep well last night and was combative to the nurses.  He appears sleepy this AM and awakens to noxious stimuli.  No other aggravating or allevitating factors.     Meds:    aspirin 81 mg Oral Daily   diltiaZEM  mg Oral Q24H   enoxaparin 40 mg Subcutaneous Q24H   furosemide 40 mg Intravenous Q12H   guaiFENesin 1,200 mg Oral Q12H   ipratropium-albuterol 3 mL Nebulization Q6H - RT   levoFLOXacin 750 mg Oral Q24H   metoprolol succinate  mg Oral Q12H   piperacillin-tazobactam 4.5 g Intravenous Q6H   QUEtiapine 400 mg Oral Nightly   vancomycin 750 mg Intravenous Q12H       Pharmacy to dose vancomycin      Review of Systems:   Review of Systems   Unable to perform ROS: Other    not verbally responsive     Physical Exam:  SpO2 Readings from Last 3 Encounters:   08/16/17 93%   10/02/16 93%   09/26/16 96%     Temp:  [97.4 °F (36.3 °C)-97.8 °F (36.6 °C)] 97.7 °F (36.5 °C)  Heart Rate:  [79-95] 95  Resp:  [16-23] 20  BP: (108-140)/(57-85) 111/85  Intake/Output Summary (Last 24 hours) at 08/16/17 0817  Last data filed at 08/16/17 0800   Gross per 24 hour   Intake             2140 ml   Output             1835 ml   Net              305 ml     Physical Exam   Constitutional: He appears well-developed and well-nourished. He is sleeping. No distress. Face mask in place.   HENT:   Head: Normocephalic and  atraumatic.   Eyes: Conjunctivae and EOM are normal. Pupils are equal, round, and reactive to light. No scleral icterus.   Neck: Normal range of motion. Neck supple.   Cardiovascular: Normal rate, regular rhythm and normal heart sounds.  Exam reveals no friction rub.    No murmur heard.  Pulmonary/Chest: Effort normal. No respiratory distress. He has decreased breath sounds. He has no wheezes. He has no rales.   Abdominal: Soft. Bowel sounds are normal. He exhibits no distension. There is no tenderness.   Musculoskeletal: Normal range of motion. He exhibits no edema.   Skin: Skin is warm and dry.   Psychiatric: He has a normal mood and affect. His behavior is normal.   Nursing note and vitals reviewed.    Laboratory Data:    Results from last 7 days  Lab Units 08/16/17  0240 08/15/17  0851   WBC 10*3/mm3 6.96 9.72   HEMOGLOBIN g/dL 9.6* 9.8*   PLATELETS 10*3/mm3 114* 129*       Results from last 7 days  Lab Units 08/16/17  0241 08/15/17  0851   SODIUM mmol/L 145 145   POTASSIUM mmol/L 3.6 3.7   BUN mg/dL 16 17   CREATININE mg/dL 1.07 1.21       Results from last 7 days  Lab Units 08/16/17  0337 08/15/17  0331 08/15/17  0226   PH, ARTERIAL pH units 7.406 7.372 7.286*   PCO2, ARTERIAL mm Hg 59.5* 60.2* 77.3*   PO2 ART mm Hg 58.0* 57.7* 96.2   FIO2 % 22 21 40     Blood Culture   Date Value Ref Range Status   08/15/2017 No growth at less than 24 hours  Preliminary   08/15/2017 No growth at less than 24 hours  Preliminary     Urine Culture   Date Value Ref Range Status   08/15/2017 No growth at 24 hours  Preliminary   08/15/2017 No growth at 24 hours  Preliminary     Pulmonary Assessment:  1. Acute/chronic hypercapnic/hypoxemic respiratory failure  2. Fever  3. Systolic heart failure  4. Volume overload  5. Anemia  6. Atrial fibrillation  7. Hypertension  8. CVA     Recommend:   · Continue BiPAP for O2 sat 88%-92%  · Zosyn/vancomycin d2, Levaquin PO d1  · Continue duonebs/mucinex/IS     Electronically signed by Almita  KEERTHI Haywood, 08/16/17, 8:17 AM     Physician substantive contribution:  Pertinent symptoms/interval history include: still hypoxic despite nppv, supplemental oxygen.  Sleeping but arousible but not verbal  Respiratory exam shows pertinent findings of:diminished breath sounds, wheezing, crackles.  Plan includes: continue bipap and transition to nasal cannulae while awake as tolerated.broad spectrum abx, follow up microbiology.  abg much better with respect to pH, but still hypoxemic.  Repeat abg in am, repeat cxr.  I have seen and examined patient personally, performing a face-to-face diagnostic evaluation with plan of care reviewed and developed with APRN and nursing staff. I have addended and/or modified the above history of present illness, physical examination, and assessment and plan to reflect my findings and impressions. Essential elements of the care plan were discussed with APRN above.  Agree with findings and assessment/plan as documented above.    Electronically signed by Ronald Recio MD, on 8/16/2017, 7:18 PM            Electronically signed by Ronald Recio MD at 8/16/2017  7:48 PM      KEERTHI Hdoges at 8/16/2017  8:16 AM  Version 1 of 2             PULMONARY AND CRITICAL CARE PROGRESS NOTE - [facility]    Patient: Froy Hoyos  1950   MR# [id number]   Acct# [id number]  08/16/17   8:17 AM  Referring Provider: Clint Torres DO    Chief Complaint: dyspnea     Interval history: Patient is resting in bed on BiPAP 20/8, FiO2 22%, O2 sat 89%.  Per EMR he did not sleep well last night and was combative to the nurses.  He appears sleepy this AM and awakens to noxious stimuli.  No other aggravating or allevitating factors.     Meds:    aspirin 81 mg Oral Daily   diltiaZEM  mg Oral Q24H   enoxaparin 40 mg Subcutaneous Q24H   furosemide 40 mg Intravenous Q12H   guaiFENesin 1,200 mg Oral Q12H   ipratropium-albuterol 3 mL Nebulization Q6H - RT   levoFLOXacin  750 mg Oral Q24H   metoprolol succinate  mg Oral Q12H   piperacillin-tazobactam 4.5 g Intravenous Q6H   QUEtiapine 400 mg Oral Nightly   vancomycin 750 mg Intravenous Q12H       Pharmacy to dose vancomycin      Review of Systems:   Review of Systems   Unable to perform ROS: Other   Patient sleeping.  He opens eyes to noxious stimuli.      Physical Exam:  SpO2 Readings from Last 3 Encounters:   08/16/17 93%   10/02/16 93%   09/26/16 96%     Temp:  [97.4 °F (36.3 °C)-97.8 °F (36.6 °C)] 97.7 °F (36.5 °C)  Heart Rate:  [79-95] 95  Resp:  [16-23] 20  BP: (108-140)/(57-85) 111/85  Intake/Output Summary (Last 24 hours) at 08/16/17 0817  Last data filed at 08/16/17 0800   Gross per 24 hour   Intake             2140 ml   Output             1835 ml   Net              305 ml     Physical Exam   Constitutional: He appears well-developed and well-nourished. He is sleeping. No distress. Face mask in place.   HENT:   Head: Normocephalic and atraumatic.   Eyes: Conjunctivae and EOM are normal. Pupils are equal, round, and reactive to light. No scleral icterus.   Neck: Normal range of motion. Neck supple.   Cardiovascular: Normal rate, regular rhythm and normal heart sounds.  Exam reveals no friction rub.    No murmur heard.  Pulmonary/Chest: Effort normal. No respiratory distress. He has decreased breath sounds. He has no wheezes. He has no rales.   Abdominal: Soft. Bowel sounds are normal. He exhibits no distension. There is no tenderness.   Musculoskeletal: Normal range of motion. He exhibits no edema.   Skin: Skin is warm and dry.   Psychiatric: He has a normal mood and affect. His behavior is normal.   Nursing note and vitals reviewed.    Laboratory Data:    Results from last 7 days  Lab Units 08/16/17  0240 08/15/17  0851   WBC 10*3/mm3 6.96 9.72   HEMOGLOBIN g/dL 9.6* 9.8*   PLATELETS 10*3/mm3 114* 129*       Results from last 7 days  Lab Units 08/16/17  0241 08/15/17  0851   SODIUM mmol/L 145 145   POTASSIUM mmol/L 3.6  3.7   BUN mg/dL 16 17   CREATININE mg/dL 1.07 1.21       Results from last 7 days  Lab Units 08/16/17  0337 08/15/17  0331 08/15/17  0226   PH, ARTERIAL pH units 7.406 7.372 7.286*   PCO2, ARTERIAL mm Hg 59.5* 60.2* 77.3*   PO2 ART mm Hg 58.0* 57.7* 96.2   FIO2 % 22 21 40     Blood Culture   Date Value Ref Range Status   08/15/2017 No growth at less than 24 hours  Preliminary   08/15/2017 No growth at less than 24 hours  Preliminary     Urine Culture   Date Value Ref Range Status   08/15/2017 No growth at 24 hours  Preliminary   08/15/2017 No growth at 24 hours  Preliminary     Recent films:  Imaging Results (last 24 hours)     Procedure Component Value Units Date/Time    XR Chest 1 View [[id number]] Collected:  08/15/17 0820     Updated:  08/15/17 0824    Narrative:       Exam:   XR CHEST 1 VW-       Date:  08/15/2017      History:  Male, age  66 years;hypoxia     COMPARISON:  None.     Findings :     The heart and mediastinum are borderline in size which may be partially  due to low lung volumes as well as technique. Low lung volumes.. Lungs  are without focal infiltrate, mass or effusions.  The bones show no  acute pathology.         Impression:       Impression:     Mild prominence of cardiac mediastinal silhouette, with mild prominence  of the central pulmonary vasculature, concerning for mild/early fluid  overload.     This report was finalized on 08/15/2017 08:21 by Dr. Lu Howell MD.        Films reviewed personally by me.  My interpretation: none today   Pulmonary Assessment:  9. Acute/chronic hypercapnic/hypoxemic respiratory failure  10. Fever  11. Systolic heart failure  12. Volume overload  13. Anemia  14. Atrial fibrillation  15. Hypertension  16. CVA     Recommend:   · Continue BiPAP for O2 sat 88%-92%  · Zosyn/vancomycin d2, Levaquin PO d1  · Continue duonebs/mucinex/IS     Electronically signed by KEERTHI Hodges, 08/16/17, 8:17 AM          Electronically signed by Almita Wilkerson  APRN at 8/16/2017  8:30 AM

## 2017-08-17 NOTE — PLAN OF CARE
Problem: Fall Risk (Adult)  Goal: Identify Related Risk Factors and Signs and Symptoms  Outcome: Ongoing (interventions implemented as appropriate)  Goal: Absence of Falls  Outcome: Ongoing (interventions implemented as appropriate)    Problem: Sepsis (Adult)  Goal: Signs and Symptoms of Listed Potential Problems Will be Absent or Manageable (Sepsis)  Outcome: Ongoing (interventions implemented as appropriate)    Problem: Respiratory Insufficiency (Adult)  Goal: Acid/Base Balance  Outcome: Ongoing (interventions implemented as appropriate)  Goal: Effective Ventilation  Outcome: Ongoing (interventions implemented as appropriate)    Problem: Skin Integrity Impairment, Risk/Actual (Adult)  Goal: Skin Integrity/Wound Healing  Outcome: Ongoing (interventions implemented as appropriate)    Problem: Patient Care Overview (Adult)  Goal: Plan of Care Review  Outcome: Ongoing (interventions implemented as appropriate)    08/17/17 0505   Coping/Psychosocial Response Interventions   Plan Of Care Reviewed With patient   Outcome Evaluation   Outcome Summary/Follow up Plan Pt has worn bipap this shift . Pt has had increased hr 180's bp 150-160 systolic . Dr. Wyman called and metotorpol ordered hr and bp better in control Pt is slert and orienteed       Goal: Adult Individualization and Mutuality  Outcome: Ongoing (interventions implemented as appropriate)

## 2017-08-17 NOTE — PLAN OF CARE
Problem: Patient Care Overview (Adult)  Goal: Plan of Care Review  Outcome: Ongoing (interventions implemented as appropriate)    08/17/17 1612   Coping/Psychosocial Response Interventions   Plan Of Care Reviewed With patient   Outcome Evaluation   Outcome Summary/Follow up Plan PT eval complete. Pt cond I bed mobility. CGA sit to stand transfers and toilet transfer. Pt ambulated up to 20' with CGA. Pt demonstrated impulsivity with mobility. Pt would benefit from PT at this time to address functionaly mobility, strength, safety, balance and activity tolerance. Anticipate D/C to SNF.         Problem: Inpatient Physical Therapy  Goal: Bed Mobility Goal LTG- PT  Outcome: Ongoing (interventions implemented as appropriate)    08/17/17 1612   Bed Mobility PT LTG   Bed Mobility PT LTG, Date Established 08/17/17   Bed Mobility PT LTG, Time to Achieve by discharge   Bed Mobility PT LTG, Activity Type all bed mobility   Bed Mobility PT LTG, [place] Level conditional independence   Bed Mobility PT Goal LTG, Assist Device bed rails       Goal: Transfer Training Goal 1 LTG- PT  Outcome: Ongoing (interventions implemented as appropriate)    08/17/17 1612   Transfer Training PT LTG   Transfer Training PT LTG, Date Established 08/17/17   Transfer Training PT LTG, Time to Achieve by discharge   Transfer Training PT LTG, Activity Type bed to chair /chair to bed;sit to stand/stand to sit   Transfer Training PT LTG, [place] Level supervision required       Goal: Gait Training Goal LTG- PT  Outcome: Ongoing (interventions implemented as appropriate)    08/17/17 1612   Gait Training PT LTG   Gait Training Goal PT LTG, Date Established 08/17/17   Gait Training Goal PT LTG, Time to Achieve by discharge   Gait Training Goal PT LTG, [place] Level supervision required   Gait Training Goal PT LTG, Distance to Achieve 200'       Goal: Strength Goal LTG- PT  Outcome: Ongoing (interventions implemented as appropriate)    08/17/17  1612   Strength Goal PT LTG   Strength Goal PT LTG, Date Established 08/17/17   Strength Goal PT LTG, Time to Achieve by discharge   Strength Goal PT LTG, Functional Goal Supine and sitting LE exercises x20 reps

## 2017-08-17 NOTE — PLAN OF CARE
Problem: Patient Care Overview (Adult)  Goal: Plan of Care Review  Outcome: Ongoing (interventions implemented as appropriate)    08/17/17 1508   Coping/Psychosocial Response Interventions   Plan Of Care Reviewed With patient   Patient Care Overview   Progress improving   Outcome Evaluation   Outcome Summary/Follow up Plan Pt seen for initial evaluation s/p HCAP. Pt demonstrated decresaed ability to complete ADL tasks, transfers, and mobility. Pt benefits from skilled OT to address deficits affecting safe d/c home. Recocmmend home with HH, vs 24 hour Supervision.          Problem: Inpatient Occupational Therapy  Goal: Strength Goal LTG- OT  Outcome: Ongoing (interventions implemented as appropriate)    08/17/17 1508   Strength OT LTG   Strength Goal OT LTG, Date Established 08/17/17   Strength Goal OT LTG, Time to Achieve by discharge   Strength Goal OT LTG, Measure to Achieve Pt will be I BUE HEP to increase strength and endurance for daily tasks       Goal: Bathing Goal LTG- OT  Outcome: Ongoing (interventions implemented as appropriate)  Goal: LB Dressing Goal LTG- OT  Outcome: Ongoing (interventions implemented as appropriate)    08/17/17 1508   LB Dressing OT LTG   LB Dressing Goal OT LTG, Date Established 08/17/17   LB Dressing Goal OT LTG, Time to Achieve by discharge   LB Dressing Goal OT LTG, [place] Level conditional independence   LB Dressing Goal OT LTG, Additional Goal pt may need AE to complete LE dressing

## 2017-08-17 NOTE — SIGNIFICANT NOTE
BIPAP TRANSPORTED TO ROOM 492.  BIPAP PLACED ON S/B READY FOR PT USE.  REPORT GIVEN TO KALEN, RESP SUP AND ADRIEN, RRT.  PT AWAKE, ALERT, AND NO RESP DISTRESS.

## 2017-08-17 NOTE — THERAPY EVALUATION
Acute Care - Occupational Therapy Initial Evaluation  [facility]     Patient Name: Froy Hoyos  : 1950  MRN: [id number]  Today's Date: 2017     Date of Referral to OT: 17  Referring Physician: Dr Torres    Admit Date: 8/15/2017       ICD-10-CM ICD-9-CM   1. Impaired mobility and ADLs Z74.09 799.89     Patient Active Problem List   Diagnosis   • Chronic congestive heart failure   • HCAP (healthcare-associated pneumonia)     Past Medical History:   Diagnosis Date   • Atrial fibrillation    • CHF (congestive heart failure)    • Hypertension    • Stroke      No past surgical history on file.       OT ASSESSMENT FLOWSHEET (last 72 hours)      OT Evaluation       17 1358    Rehab Evaluation    Document Type evaluation  -AC    Subjective Information agree to therapy;complains of  -AC    Patient Effort, Rehab Treatment good  -AC    Symptoms Noted During/After Treatment increased pain  -AC    General Information    Patient Profile Review yes  -AC    Referring Physician Dr Torres  -AC    General Observations Pt supine in bed upon arrival to room, O2 saturation fluctuating between 86-96%  -AC    Pertinent History Of Current Problem past medical history of atrial fibrillation, CHF, hypertension, stroke was seen at [facility] with shortness of breath.  When EMS arrived at patient's home patient's O2 saturation was found to be 70%.  Patient is on 3 L oxygen at home.  After transfer to the ER patient was put on BiPAP.  In the ER patient was found to have a temperature of 103.5.  In the ER patient received a CT scan which was significant for pneumonia versus pulmonary edema.  Later patient's O2 saturation was noted to be 97% on BiPAP  -AC    Precautions/Limitations fall precautions;oxygen therapy device and L/min  -AC    Prior Level of Function independent:;ADL's;bed mobility;dressing;bathing;home management;cooking;cleaning;dependent:;driving;shopping  -AC    Equipment Currently Used at Home  oxygen  -AC    Plans/Goals Discussed With patient;agreed upon  -AC    Risks Reviewed patient:;LOB;increased discomfort;dizziness  -AC    Benefits Reviewed patient:;improve function;increase independence;increase strength;increase balance;decrease risk of DVT;improve skin integrity  -    Barriers to Rehab previous functional deficit;uncooperative   impulsive  -AC    Living Environment    Lives With alone  -AC    Living Arrangements apartment  -AC    Home Accessibility no concerns  -    Type of Financial/Environmental Concern none  -    Transportation Available     Living Environment Comment lives alone in apartment, walk in shower, no device, no DME; pt son completes shopping and assist pt with homemaking taskss  -    Clinical Impression    Date of Referral to OT 08/17/17  -    OT Diagnosis Dr Kang  -    Prognosis good  -AC    Functional Level At Time Of Evaluation Min A transfers and mobility no device  -AC    Impairments Found (describe specific impairments) aerobic capacity/endurance;gait, locomotion, and balance;motor function;muscle performance;ergonomics and body mechanics;ventilation and respiration/gas exchange  -AC    Patient/Family Goals Statement to o ome  -    Criteria for Skilled Therapeutic Interventions Met yes;treatment indicated  -AC    Rehab Potential good, to achieve stated therapy goals  -    Therapy Frequency 3-5 times/wk  -AC    Predicted Duration of Therapy Intervention (days/wks) til d/c  -AC    Anticipated Equipment Needs At Discharge raised toilet seat;shower chair;two wheeled walker;rollator  -    Anticipated Discharge Disposition home with home health  -AC    Functional Level Prior    Ambulation     Transferring     Toileting     Bathing     Dressing     Eating     Communication     Swallowing     Prior Functional Level Comment     Vital Signs    Pre SpO2 (%) 88  -AC    O2 Delivery Pre Treatment supplemental O2  -AC    Intra SpO2 (%) 92  -AC    O2 Delivery Intra  Treatment supplemental O2  -AC    Post SpO2 (%) 92  -AC    O2 Delivery Post Treatment supplemental O2  -AC    Pre Patient Position Supine  -AC    Intra Patient Position Standing  -AC    Post Patient Position Sitting  -AC    Pain Assessment    Pain Assessment 0-10  -    Pain Score 6  -    Pain Type Chronic pain  -AC    Pain Location Back  -AC    Pain Orientation Mid;Lower  -AC    Pain Frequency Constant/continuous  -AC    Pain Onset Ongoing  -    Vision Assessment/Intervention    Visual Impairment WFL  -AC    Cognitive Assessment/Intervention    Current Cognitive/Communication Assessment functional  -AC    Orientation Status oriented x 4  -AC    Follows Commands/Answers Questions 100% of the time  -AC    Personal Safety impulsive  -AC    Personal Safety Interventions nonskid shoes/slippers when out of bed;gait belt  -AC    ROM (Range of Motion)    General ROM no range of motion deficits identified  -    MMT (Manual Muscle Testing)    General MMT Assessment no strength deficits identified  -    Bed Mobility, Assessment/Treatment    Bed Mobility, Assistive Device bed rails  -    Bed Mob, Supine to Sit, [place] conditional independence  -AC    Transfer Assessment/Treatment    Transfers, Sit-Stand [place] contact guard assist  -    Transfers, Stand-Sit [place] contact guard assist  -AC    Toilet Transfer, [place] contact guard assist  -AC    Functional Mobility    Functional Mobility- Ind. Level minimum assist (75% patient effort)  -    Functional Mobility- Safety Issues balance decreased during turns;supplemental O2  -AC    Functional Mobility- Comment to/from bathroom  -    Upper Body Bathing Assessment/Training    UB Bathing Assess/Train, [place] Level set up required  -    Lower Body Bathing Assessment/Training    LB Bathing Assess/Train, [place] Level moderate assist (50% patient effort)  -    Upper Body Dressing Assessment/Training    UB Dressing Assess/Train,  [place] set up required  -AC    Lower Body Dressing Assessment/Training    LB Dressing Assess/Train, [place] moderate assist (50% patient effort)  -AC    Toileting Assessment/Training    Toileting Assess/Train, [place] Level minimum assist (75% patient effort)  -AC    Toileting Assess/Train, Impairments impaired balance  -AC    Motor Skills/Interventions    Additional Documentation Balance Skills Training (Group)  -AC    Balance Skills Training    Sitting-Level of Assistance Close supervision  -AC    Sitting-Balance Support Right upper extremity supported;Left upper extremity supported;Feet supported  -AC    Standing-Level of Assistance Minimum assistance  -AC    Static Standing Balance Support Right upper extremity supported  -AC    Gait Balance-Level of Assistance Minimum assistance  -AC    Gait Balance Support Right upper extremity supported  -AC    General Therapy Interventions    Planned Therapy Interventions activity intolerance;ADL retraining;IADL retraining;balance training;joint mobilization;home exercise program;energy conservation;strengthening  -AC    Positioning and Restraints    Pre-Treatment Position in bed  -AC    Post Treatment Position chair  -AC    In Chair sitting;call light within reach;encouraged to call for assist  -AC      User Key  (r) = Recorded By, (t) = Taken By, (c) = Cosigned By    Initials Name Effective Dates     Anisha Zurita OTR/NEGAR 09/07/16 -            Occupational Therapy Education     Title: PT OT SLP Therapies (Done)     Topic: Occupational Therapy (Done)     Point: ADL training (Done)    Description: Instruct learner(s) on proper safety adaptation and remediation techniques during self care or transfers.   Instruct in proper use of assistive devices.    Learning Progress Summary    Learner Readiness Method Response Comment Documented by Status   Patient Acceptance E VU Pt educated on benefits of participation with OT to increase I for d/c home and safety   08/17/17 1504 Done               Point: Home exercise program (Done)    Description: Instruct learner(s) on appropriate technique for monitoring, assisting and/or progressing therapeutic exercises/activities.    Learning Progress Summary    Learner Readiness Method Response Comment Documented by Status   Patient Acceptance E VU Pt educated on benefits of participation with OT to increase I for d/c home and safety  08/17/17 1504 Done               Point: Body mechanics (Done)    Description: Instruct learner(s) on proper positioning and spine alignment during self-care, functional mobility activities and/or exercises.    Learning Progress Summary    Learner Readiness Method Response Comment Documented by Status   Patient Acceptance E VU Pt educated on benefits of participation with OT to increase I for d/c home and safety  08/17/17 1504 Done                      User Key     Initials Effective Dates Name Provider Type Discipline     09/07/16 -  Anisha Zurita, OTR/L Occupational Therapist OT                  OT Recommendation and Plan  Anticipated Equipment Needs At Discharge: raised toilet seat, shower chair, two wheeled walker, rollator  Anticipated Discharge Disposition: home with home health  Planned Therapy Interventions: activity intolerance, ADL retraining, IADL retraining, balance training, joint mobilization, home exercise program, energy conservation, strengthening  Therapy Frequency: 3-5 times/wk  Plan of Care Review  Plan Of Care Reviewed With: patient  Progress: improving  Outcome Summary/Follow up Plan: Pt seen for initial evaluation s/p HCAP.  Pt demonstrated decresaed ability to complete ADL tasks,  transfers, and mobility.  Pt benefits from skilled OT to address deficits affecting safe d/c  home. Recocmmend home with HH, vs 24 hour Supervision.           OT Goals       08/17/17 1508          Strength OT LTG    Strength Goal OT LTG, Date Established 08/17/17  -      Strength Goal OT LTG,  Time to Achieve by discharge  -AC      Strength Goal OT LTG, Measure to Achieve Pt will be I BUE HEP to increase strength and endurance for daily tasks  -AC      Bathing OT LTG    Bathing Goal OT LTG, Date Established 08/17/17  -AC      Bathing Goal OT LTG, Time to Achieve by discharge  -AC      Bathing Goal OT LTG, Activity Type upper body bathing;lower body bathing  -AC      Bathing Goal OT LTG, [place] Level conditional independence  -AC      LB Dressing OT LTG    LB Dressing Goal OT LTG, Date Established 08/17/17  -AC      LB Dressing Goal OT LTG, Time to Achieve by discharge  -AC      LB Dressing Goal OT LTG, [place] Level conditional independence  -AC      LB Dressing Goal OT LTG, Additional Goal pt may need AE to complete LE dressing  -AC        User Key  (r) = Recorded By, (t) = Taken By, (c) = Cosigned By    Initials Name Provider Type     JOSE ROBERTO Maynard/L Occupational Therapist                Outcome Measures       08/17/17 1500          How much help from another is currently needed...    Putting on and taking off regular lower body clothing? 2  -AC      Bathing (including washing, rinsing, and drying) 3  -AC      Toileting (which includes using toilet bed pan or urinal) 3  -AC      Putting on and taking off regular upper body clothing 4  -AC      Taking care of personal grooming (such as brushing teeth) 4  -AC      Eating meals 4  -AC      Score 20  -AC      Functional Assessment    Outcome Measure Options AM-PAC 6 Clicks Daily Activity (OT)  -AC        User Key  (r) = Recorded By, (t) = Taken By, (c) = Cosigned By    Initials Name Provider Type    JOSE ROBERTO Darden/L Occupational Therapist          Time Calculation:        Therapy Charges for Today     Code Description Service Date Service Provider Modifiers Qty    [id number]  OT SELFCARE CURRENT 8/17/2017 JOSE ROBERTO Maynard/L GO, CJ 1    [id number]  OT SELFCARE PROJECTED 8/17/2017 JOSE ROBERTO Maynard/NEGAR RAMÍREZ, CI 1     [id number]  OT EVAL MOD COMPLEXITY 4 8/17/2017 Anisha Zurita, OTR/L GO 1          OT G-codes  OT Professional Judgement Used?: Yes  OT Functional Scales Options: AM-PAC 6 Clicks Daily Activity (OT)  Score: 20  Functional Limitation: Self care  Self Care Current Status (): At least 20 percent but less than 40 percent impaired, limited or restricted  Self Care Goal Status (): At least 1 percent but less than 20 percent impaired, limited or restricted    Anisha Zurita, OTR/L  8/17/2017

## 2017-08-17 NOTE — THERAPY EVALUATION
Acute Care - Physical Therapy Initial Evaluation  [facility]     Patient Name: Froy Hoyos  : 1950  MRN: [id number]  Today's Date: 2017   Onset of Illness/Injury or Date of Surgery Date: 08/15/17  Date of Referral to PT: 17  Referring Physician: Dr Torres      Admit Date: 8/15/2017     Visit Dx:    ICD-10-CM ICD-9-CM   1. Impaired mobility and ADLs Z74.09 799.89   2. Impaired functional mobility, balance, gait, and endurance Z74.09 V49.89     Patient Active Problem List   Diagnosis   • Chronic congestive heart failure   • HCAP (healthcare-associated pneumonia)     Past Medical History:   Diagnosis Date   • Atrial fibrillation    • CHF (congestive heart failure)    • Hypertension    • Stroke      No past surgical history on file.       PT ASSESSMENT (last 72 hours)      PT Evaluation       17 1421 17 1358    Rehab Evaluation    Document Type evaluation   see MAR  -PB (r) MS (t) PB (c) evaluation  -AC    Subjective Information agree to therapy;complains of;weakness;pain;dyspnea  -PB (r) MS (t) PB (c) agree to therapy;complains of  -AC    Patient Effort, Rehab Treatment  good  -AC    Symptoms Noted During/After Treatment  increased pain  -AC    General Information    Patient Profile Review yes  -PB (r) MS (t) PB (c) yes  -AC    Onset of Illness/Injury or Date of Surgery Date 08/15/17  -PB (r) MS (t) PB (c)     Referring Physician Dr Torres  -PB (r) MS (t) PB (c) Dr Torres  -AC    General Observations Pt EOB upon arrival, 2LO2 NC  -PB (r) MS (t) PB (c) Pt supine in bed upon arrival to room, O2 saturation fluctuating between 86-96%  -AC    Pertinent History Of Current Problem past medical history of atrial fibrillation, CHF, hypertension, stroke was seen at [facility] with shortness of breath. When EMS arrived at patient's home patient's O2 saturation was found to be 70%. Patient is on 3 L oxygen at home. After transfer to the ER patient was put on BiPAP. In the ER patient was  found to have a temperature of 103.5. In the ER patient received a CT scan which was significant for pneumonia versus pulmonary edema. Later patient's O2 saturation was noted to be 97% on BiPAP  -PB (r) MS (t) PB (c) past medical history of atrial fibrillation, CHF, hypertension, stroke was seen at [facility] with shortness of breath.  When EMS arrived at patient's home patient's O2 saturation was found to be 70%.  Patient is on 3 L oxygen at home.  After transfer to the ER patient was put on BiPAP.  In the ER patient was found to have a temperature of 103.5.  In the ER patient received a CT scan which was significant for pneumonia versus pulmonary edema.  Later patient's O2 saturation was noted to be 97% on BiPAP  -AC    Precautions/Limitations fall precautions;oxygen therapy device and L/min  -PB (r) MS (t) PB (c) fall precautions;oxygen therapy device and L/min  -AC    Prior Level of Function independent:;all household mobility;ADL's;dependent:;home management;driving  -PB (r) MS (t) PB (c) independent:;ADL's;bed mobility;dressing;bathing;home management;cooking;cleaning;dependent:;driving;shopping  -AC    Equipment Currently Used at Home oxygen  -PB (r) MS (t) PB (c) oxygen  -AC    Plans/Goals Discussed With patient;agreed upon  -PB (r) MS (t) PB (c) patient;agreed upon  -AC    Risks Reviewed patient:;LOB;dizziness;increased discomfort;change in vital signs  -PB (r) MS (t) PB (c) patient:;LOB;increased discomfort;dizziness  -AC    Benefits Reviewed patient:;improve function;increase strength;decrease pain;increase knowledge  -PB (r) MS (t) PB (c) patient:;improve function;increase independence;increase strength;increase balance;decrease risk of DVT;improve skin integrity  -AC    Barriers to Rehab medically complex;previous functional deficit   impulsive  -PB (r) MS (t) PB (c) previous functional deficit;uncooperative   impulsive  -AC    Living Environment    Lives With alone  -PB (r) MS (t) PB (c)  alone  -AC    Living Arrangements apartment  -PB (r) MS (t) PB (c) apartment  -AC    Home Accessibility no concerns  -PB (r) MS (t) PB (c) no concerns  -AC    Type of Financial/Environmental Concern  none  -AC    Living Environment Comment  lives alone in apartment, walk in shower, no device, no DME; pt son completes shopping and assist pt with homemaking taskss  -AC    Clinical Impression    Date of Referral to PT 08/17/17  -PB (r) MS (t) PB (c)     Patient/Family Goals Statement Return home  -PB (r) MS (t) PB (c)     Criteria for Skilled Therapeutic Interventions Met yes;treatment indicated  -PB (r) MS (t) PB (c)     Rehab Potential good, to achieve stated therapy goals  -PB (r) MS (t) PB (c)     Predicted Duration of Therapy Intervention (days/wks) Until D/C  -PB (r) MS (t) PB (c)     Vital Signs    Pre SpO2 (%) 88  -PB (r) MS (t) PB (c) 88  -AC    O2 Delivery Pre Treatment supplemental O2  -PB (r) MS (t) PB (c) supplemental O2  -AC    Intra SpO2 (%) 92  -PB (r) MS (t) PB (c) 92  -AC    O2 Delivery Intra Treatment supplemental O2  -PB (r) MS (t) PB (c) supplemental O2  -AC    Post SpO2 (%) 96  -PB (r) MS (t) PB (c) 92  -AC    O2 Delivery Post Treatment supplemental O2  -PB (r) MS (t) PB (c) supplemental O2  -AC    Pre Patient Position Supine  -PB (r) MS (t) PB (c) Supine  -AC    Intra Patient Position Standing  -PB (r) MS (t) PB (c) Standing  -AC    Post Patient Position Sitting  -PB (r) MS (t) PB (c) Sitting  -AC    Pain Assessment    Pain Assessment Ruiz-Carey FACES  -PB (r) MS (t) PB (c) 0-10  -AC    Ruiz-Carey FACES Pain Rating 4  -PB (r) MS (t) PB (c)     Pain Score --  -PB (r) MS (t) PB (c) 6  -AC    Pain Type Chronic pain  -PB (r) MS (t) PB (c) Chronic pain  -AC    Pain Location Back  -PB (r) MS (t) PB (c) Back  -AC    Pain Orientation Mid;Lower  -PB (r) MS (t) PB (c) Mid;Lower  -AC    Pain Frequency Constant/continuous  -PB (r) MS (t) PB (c) Constant/continuous  -AC    Pain Onset  Ongoing  -AC    Pain  Intervention(s) Repositioned;Ambulation/increased activity  -PB (r) MS (t) PB (c)     Response to Interventions tolerated  -PB (r) MS (t) PB (c)     Vision Assessment/Intervention    Visual Impairment  WFL  -AC    Cognitive Assessment/Intervention    Current Cognitive/Communication Assessment functional  -PB (r) MS (t) PB (c) functional  -AC    Orientation Status oriented x 4  -PB (r) MS (t) PB (c) oriented x 4  -AC    Follows Commands/Answers Questions 100% of the time;able to follow multi-step instructions  -PB (r) MS (t) PB (c) 100% of the time  -AC    Personal Safety decreased awareness, need for assist;decreased awareness, need for safety;impulsive  -PB (r) MS (t) PB (c) impulsive  -AC    Personal Safety Interventions gait belt;nonskid shoes/slippers when out of bed;supervised activity  -PB (r) MS (t) PB (c) nonskid shoes/slippers when out of bed;gait belt  -AC    ROM (Range of Motion)    General ROM no range of motion deficits identified  -PB (r) MS (t) PB (c) no range of motion deficits identified  -AC    MMT (Manual Muscle Testing)    General MMT Assessment  no strength deficits identified  -AC    General MMT Assessment Detail Functionally 4/5  -PB (r) MS (t) PB (c)     Bed Mobility, Assessment/Treatment    Bed Mobility, Assistive Device bed rails;head of bed elevated  -PB (r) MS (t) PB (c) bed rails  -AC    Bed Mob, Supine to Sit, [place] conditional independence  -PB (r) MS (t) PB (c) conditional independence  -AC    Bed Mobility, Impairments strength decreased;pain  -PB (r) MS (t) PB (c)     Transfer Assessment/Treatment    Transfers, Sit-Stand [place] contact guard assist  -PB (r) MS (t) PB (c) contact guard assist  -AC    Transfers, Stand-Sit [place] contact guard assist  -PB (r) MS (t) PB (c) contact guard assist  -AC    Toilet Transfer, [place] contact guard assist  -PB (r) MS (t) PB (c) contact guard assist  -AC    Transfer, Safety Issues step length decreased;impulsivity  -PB (r)  MS (t) PB (c)     Transfer, Impairments strength decreased;pain  -PB (r) MS (t) PB (c)     Gait Assessment/Treatment    Gait, [place] Level contact guard assist  -PB (r) MS (t) PB (c)     Gait, Distance (Feet) 10   15; sitting rest break (to and from bathroom)  -PB (r) MS (t) PB (c)     Gait, Gait Deviations carmelo decreased;forward flexed posture;step length decreased;stride length decreased  -PB (r) MS (t) PB (c)     Gait, Safety Issues step length decreased  -PB (r) MS (t) PB (c)     Gait, Impairments strength decreased;pain   impulsive  -PB (r) MS (t) PB (c)     Motor Skills/Interventions    Additional Documentation Balance Skills Training (Group)  -PB (r) MS (t) PB (c) Balance Skills Training (Group)  -AC    Balance Skills Training    Sitting-Level of Assistance Close supervision  -PB (r) MS (t) PB (c) Close supervision  -AC    Sitting-Balance Support Right upper extremity supported;Left upper extremity supported;Feet supported  -PB (r) MS (t) PB (c) Right upper extremity supported;Left upper extremity supported;Feet supported  -AC    Standing-Level of Assistance Contact guard  -PB (r) MS (t) PB (c) Minimum assistance  -AC    Static Standing Balance Support No upper extremity supported  -PB (r) MS (t) PB (c) Right upper extremity supported  -AC    Gait Balance-Level of Assistance Contact guard  -PB (r) MS (t) PB (c) Minimum assistance  -AC    Gait Balance Support No upper extremity supported  -PB (r) MS (t) PB (c) Right upper extremity supported  -AC    Sensory Assessment/Intervention    Sensory Impairment --   WFL per pt  -PB (r) MS (t) PB (c)     Positioning and Restraints    Pre-Treatment Position in bed  -PB (r) MS (t) PB (c) in bed  -AC    Post Treatment Position chair  -PB (r) MS (t) PB (c) chair  -AC    In Chair sitting;call light within reach;encouraged to call for assist  -PB (r) MS (t) PB (c) sitting;call light within reach;encouraged to call for assist  -AC      08/15/17 1445 08/15/17 0723     General Information    Equipment Currently Used at Home bipap/ cpap;oxygen  -CE none  -WILLA    Living Environment    Lives With alone  -CE     Living Arrangements apartment  -CE     Transportation Available car;family or friend will provide  -CE       User Key  (r) = Recorded By, (t) = Taken By, (c) = Cosigned By    Initials Name Provider Type    WILLA Almita England, RN Registered Nurse    PB Gilberto Loza, PT DPT Physical Therapist    CE Iliana Hankins, SHANW     SANDEEP Zurita, OTR/L Occupational Therapist    MS Tanesha PORTILLO Anastacia, PT Student PT Student          Physical Therapy Education     Title: PT OT SLP Therapies (Active)     Topic: Physical Therapy (Active)     Point: Mobility training (Done)    Learning Progress Summary    Learner Readiness Method Response Comment Documented by Status   Patient Acceptance E VU,NR Need for assist, plans and goals for therapy, benefits of activity MS 08/17/17 1617 Done               Point: Precautions (Done)    Learning Progress Summary    Learner Readiness Method Response Comment Documented by Status   Patient Acceptance E VU,NR Need for assist, plans and goals for therapy, benefits of activity MS 08/17/17 1617 Done                      User Key     Initials Effective Dates Name Provider Type Discipline    MS 07/19/17 -  Tanesha PORTILLO Anastacia, PT Student PT Student PT                PT Recommendation and Plan  Anticipated Discharge Disposition: skilled nursing facility  Planned Therapy Interventions: balance training, bed mobility training, gait training, home exercise program, patient/family education, strengthening, transfer training  PT Frequency: daily, 2 times/day, per priority policy  Plan of Care Review  Plan Of Care Reviewed With: patient  Outcome Summary/Follow up Plan: PT eval complete. Pt cond I bed mobility. CGA sit to stand transfers and toilet transfer. Pt ambulated up to 20' with CGA. Pt demonstrated impulsivity with mobility. Pt would benefit from  PT at this time to address functionaly mobility, strength, safety, balance and activity tolerance. Anticipate D/C to SNF.          IP PT Goals       08/17/17 1612          Bed Mobility PT LTG    Bed Mobility PT LTG, Date Established 08/17/17  -PB (r) MS (t) PB (c)      Bed Mobility PT LTG, Time to Achieve by discharge  -PB (r) MS (t) PB (c)      Bed Mobility PT LTG, Activity Type all bed mobility  -PB (r) MS (t) PB (c)      Bed Mobility PT LTG, [place] Level conditional independence  -PB (r) MS (t) PB (c)      Bed Mobility PT Goal  LTG, Assist Device bed rails  -PB (r) MS (t) PB (c)      Transfer Training PT LTG    Transfer Training PT LTG, Date Established 08/17/17  -PB (r) MS (t) PB (c)      Transfer Training PT LTG, Time to Achieve by discharge  -PB (r) MS (t) PB (c)      Transfer Training PT LTG, Activity Type bed to chair /chair to bed;sit to stand/stand to sit  -PB (r) MS (t) PB (c)      Transfer Training PT LTG, [place] Level supervision required  -PB (r) MS (t) PB (c)      Gait Training PT LTG    Gait Training Goal PT LTG, Date Established 08/17/17  -PB (r) MS (t) PB (c)      Gait Training Goal PT LTG, Time to Achieve by discharge  -PB (r) MS (t) PB (c)      Gait Training Goal PT LTG, [place] Level supervision required  -PB (r) MS (t) PB (c)      Gait Training Goal PT LTG, Distance to Achieve 200'  -PB (r) MS (t) PB (c)      Strength Goal PT LTG    Strength Goal PT LTG, Date Established 08/17/17  -PB (r) MS (t) PB (c)      Strength Goal PT LTG, Time to Achieve by discharge  -PB (r) MS (t) PB (c)      Strength Goal PT LTG, Functional Goal Supine and sitting LE exercises x20 reps  -PB (r) MS (t) PB (c)        User Key  (r) = Recorded By, (t) = Taken By, (c) = Cosigned By    Initials Name Provider Type    CRISTOFER Loza, PT DPT Physical Therapist    MS Tanesha A Swager, PT Student PT Student                Outcome Measures       08/17/17 1500 08/17/17 1421       How much help from another  person do you currently need...    Turning from your back to your side while in flat bed without using bedrails?  4  -PB (r) MS (t) PB (c)     Moving from lying on back to sitting on the side of a flat bed without bedrails?  3  -PB (r) MS (t) PB (c)     Moving to and from a bed to a chair (including a wheelchair)?  3  -PB (r) MS (t) PB (c)     Standing up from a chair using your arms (e.g., wheelchair, bedside chair)?  3  -PB (r) MS (t) PB (c)     Climbing 3-5 steps with a railing?  3  -PB (r) MS (t) PB (c)     To walk in hospital room?  3  -PB (r) MS (t) PB (c)     AM-PAC 6 Clicks Score  19  -PB (r) MS (t)     How much help from another is currently needed...    Putting on and taking off regular lower body clothing? 2  -AC      Bathing (including washing, rinsing, and drying) 3  -AC      Toileting (which includes using toilet bed pan or urinal) 3  -AC      Putting on and taking off regular upper body clothing 4  -AC      Taking care of personal grooming (such as brushing teeth) 4  -AC      Eating meals 4  -AC      Score 20  -AC      Functional Assessment    Outcome Measure Options AM-PAC 6 Clicks Daily Activity (OT)  -AC AM-PAC 6 Clicks Basic Mobility (PT)  -PB (r) MS (t) PB (c)       User Key  (r) = Recorded By, (t) = Taken By, (c) = Cosigned By    Initials Name Provider Type    PB Gilberto Loza, PT DPT Physical Therapist    AC Anisha Zurita, OTR/L Occupational Therapist    MS Tanesha Galvaiz, PT Student PT Student           Time Calculation:         PT Charges       08/17/17 1618          Time Calculation    Start Time 1421  -PB (r) MS (t) PB (c)      Stop Time 1445  -PB (r) MS (t) PB (c)      Time Calculation (min) 24 min  -PB (r) MS (t)      PT Received On 08/17/17  -PB (r) MS (t) PB (c)      PT Goal Re-Cert Due Date 08/27/17  -PB (r) MS (t) PB (c)        User Key  (r) = Recorded By, (t) = Taken By, (c) = Cosigned By    Initials Name Provider Type    PB Gilberto Loza, PT DPT Physical Therapist    MS  Tanesha Galaviz, PT Student PT Student          Therapy Charges for Today     Code Description Service Date Service Provider Modifiers Qty    97286305642 HC PT EVAL MOD COMPLEXITY 2 8/17/2017 Tanesha Galaviz, PT Student GP 1          PT G-Codes  Outcome Measure Options: AM-PAC 6 Clicks Daily Activity (OT)  Score: 19  Functional Limitation: Mobility: Walking and moving around  Mobility: Walking and Moving Around Current Status (): At least 20 percent but less than 40 percent impaired, limited or restricted  Mobility: Walking and Moving Around Goal Status (): At least 1 percent but less than 20 percent impaired, limited or restricted      Tanesha Galaviz, PT Student  8/17/2017

## 2017-08-17 NOTE — PROGRESS NOTES
PULMONARY AND CRITICAL CARE PROGRESS NOTE - [facility]    Patient: Froy Hoyos  1950   MR# [id number]   Acct# [id number]  08/17/17   8:58 AM  Referring Provider: Clint Torres DO    Chief Complaint: dyspnea     Interval history: Patient is sleeping.  BiPAP is off.  O2 sat 91% on 2 L nasal cannula.  Per nursing the patient has been up to chair for all meals and ambulating to commode for BMs.  He has been drowsy but remains alert and oriented when awake.  No other aggravating or allevitating factors.     Meds:    aspirin 81 mg Oral Daily   diltiaZEM  mg Oral Q24H   enoxaparin 40 mg Subcutaneous Q24H   furosemide 40 mg Intravenous Q12H   guaiFENesin 1,200 mg Oral Q12H   ipratropium 0.5 mg Nebulization Q6H - RT   levalbuterol 1.25 mg Nebulization Q6H - RT   levoFLOXacin 750 mg Oral Q24H   metoprolol succinate  mg Oral Q12H   piperacillin-tazobactam 4.5 g Intravenous Q6H   QUEtiapine 400 mg Oral Nightly   vancomycin 750 mg Intravenous Q12H       Pharmacy to dose vancomycin      Review of Systems:   Review of Systems   Unable to perform ROS: Other    not verbally responsive     Physical Exam:  SpO2 Readings from Last 3 Encounters:   08/17/17 91%   10/02/16 93%   09/26/16 96%     Temp:  [97.5 °F (36.4 °C)-98.4 °F (36.9 °C)] 98 °F (36.7 °C)  Heart Rate:  [] 99  Resp:  [17-21] 20  BP: (106-162)/() 129/73    Intake/Output Summary (Last 24 hours) at 08/17/17 0858  Last data filed at 08/17/17 0100   Gross per 24 hour   Intake              590 ml   Output             1775 ml   Net            -1185 ml     Physical Exam   Constitutional: He appears well-developed and well-nourished. He is sleeping. No distress. Nasal cannula in place.   HENT:   Head: Normocephalic and atraumatic.   Eyes: Conjunctivae and EOM are normal. Pupils are equal, round, and reactive to light. No scleral icterus.   Neck: Normal range of motion. Neck supple.   Cardiovascular: Normal rate, regular rhythm and  normal heart sounds.  Exam reveals no friction rub.    No murmur heard.  Pulmonary/Chest: Effort normal. No respiratory distress. He has decreased breath sounds. He has no wheezes. He has no rales.   Abdominal: Soft. Bowel sounds are normal. He exhibits no distension. There is no tenderness.   Musculoskeletal: Normal range of motion. He exhibits no edema.   Skin: Skin is warm and dry.   Psychiatric: He has a normal mood and affect. His behavior is normal.   Nursing note and vitals reviewed.    Laboratory Data:    Results from last 7 days  Lab Units 08/16/17  0240 08/15/17  0851   WBC 10*3/mm3 6.96 9.72   HEMOGLOBIN g/dL 9.6* 9.8*   PLATELETS 10*3/mm3 114* 129*       Results from last 7 days  Lab Units 08/17/17  0139 08/16/17  0241 08/15/17  0851   SODIUM mmol/L  --  145 145   SODIUM, ARTERIAL mmol/L 138.6  --   --    POTASSIUM mmol/L  --  3.6 3.7   BUN mg/dL  --  16 17   CREATININE mg/dL  --  1.07 1.21       Results from last 7 days  Lab Units 08/17/17  0139 08/16/17  0337 08/15/17  0331 08/15/17  0226   PH, ARTERIAL pH units 7.396 7.406 7.372 7.286*   PCO2, ARTERIAL mm Hg 64.7* 59.5* 60.2* 77.3*   PO2 ART mm Hg 54.2* 58.0* 57.7* 96.2   FIO2 %  --  22 21 40     Blood Culture   Date Value Ref Range Status   08/15/2017 No growth at less than 24 hours  Preliminary   08/15/2017 No growth at less than 24 hours  Preliminary     Urine Culture   Date Value Ref Range Status   08/15/2017 No growth at 24 hours  Preliminary   08/15/2017 No growth at 24 hours  Preliminary     Pulmonary Assessment:  1. Acute/chronic hypercapnic/hypoxemic respiratory failure  2. Fever  3. Systolic heart failure  4. Volume overload  5. Anemia  6. Atrial fibrillation  7. Hypertension  8. CVA     Recommend:   · Continue supplemental O2 for O2 sat 88%-92%, continue BiPAP as needed  · Zosyn/vancomycin d3, Levaquin PO d2  · Continue nebs/mucinex/IS     Electronically signed by KEERTHI Hodges, 08/17/17, 8:58 AM     Physician substantive  contribution:  Pertinent symptoms/interval history include: no new complaints, remains on oxygen at 2 lpm.  Moderate dyspnea in chest, improved  Respiratory exam shows pertinent findings of:diminished breath sounds, wheezing, crackles, stable.  Plan includes: continue oxygen, still very hypoxic but resp acidosis better.  Ok to floor, continue oxygen, steroids.  I have seen and examined patient personally, performing a face-to-face diagnostic evaluation with plan of care reviewed and developed with APRN and nursing staff. I have addended and/or modified the above history of present illness, physical examination, and assessment and plan to reflect my findings and impressions. Essential elements of the care plan were discussed with APRN above.  Agree with findings and assessment/plan as documented above.    Electronically signed by Ronald Recio MD, on 8/17/2017, 6:35 PM

## 2017-08-18 LAB
ANION GAP SERPL CALCULATED.3IONS-SCNC: 11 MMOL/L (ref 4–13)
BUN BLD-MCNC: 17 MG/DL (ref 5–21)
BUN/CREAT SERPL: 16.7 (ref 7–25)
CALCIUM SPEC-SCNC: 8.8 MG/DL (ref 8.4–10.4)
CHLORIDE SERPL-SCNC: 101 MMOL/L (ref 98–110)
CO2 SERPL-SCNC: 31 MMOL/L (ref 24–31)
CREAT BLD-MCNC: 1.02 MG/DL (ref 0.5–1.4)
DEPRECATED RDW RBC AUTO: 47.4 FL (ref 40–54)
ERYTHROCYTE [DISTWIDTH] IN BLOOD BY AUTOMATED COUNT: 14.8 % (ref 12–15)
GFR SERPL CREATININE-BSD FRML MDRD: 73 ML/MIN/1.73
GLUCOSE BLD-MCNC: 112 MG/DL (ref 70–100)
HCT VFR BLD AUTO: 30.1 % (ref 40–52)
HGB BLD-MCNC: 9.5 G/DL (ref 14–18)
MCH RBC QN AUTO: 27.9 PG (ref 28–32)
MCHC RBC AUTO-ENTMCNC: 31.6 G/DL (ref 33–36)
MCV RBC AUTO: 88.3 FL (ref 82–95)
PLATELET # BLD AUTO: 136 10*3/MM3 (ref 130–400)
PMV BLD AUTO: 11.1 FL (ref 6–12)
POTASSIUM BLD-SCNC: 3.8 MMOL/L (ref 3.5–5.3)
RBC # BLD AUTO: 3.41 10*6/MM3 (ref 4.8–5.9)
SODIUM BLD-SCNC: 143 MMOL/L (ref 135–145)
WBC NRBC COR # BLD: 6.05 10*3/MM3 (ref 4.8–10.8)

## 2017-08-18 PROCEDURE — 25010000002 ENOXAPARIN PER 10 MG: Performed by: INTERNAL MEDICINE

## 2017-08-18 PROCEDURE — 25010000002 FUROSEMIDE PER 20 MG: Performed by: INTERNAL MEDICINE

## 2017-08-18 PROCEDURE — 97110 THERAPEUTIC EXERCISES: CPT

## 2017-08-18 PROCEDURE — 94799 UNLISTED PULMONARY SVC/PX: CPT

## 2017-08-18 PROCEDURE — 97530 THERAPEUTIC ACTIVITIES: CPT

## 2017-08-18 PROCEDURE — 25010000002 PIPERACILLIN SOD-TAZOBACTAM PER 1 G: Performed by: FAMILY MEDICINE

## 2017-08-18 PROCEDURE — 94760 N-INVAS EAR/PLS OXIMETRY 1: CPT

## 2017-08-18 PROCEDURE — 97535 SELF CARE MNGMENT TRAINING: CPT

## 2017-08-18 PROCEDURE — 85027 COMPLETE CBC AUTOMATED: CPT | Performed by: INTERNAL MEDICINE

## 2017-08-18 PROCEDURE — 80048 BASIC METABOLIC PNL TOTAL CA: CPT | Performed by: INTERNAL MEDICINE

## 2017-08-18 PROCEDURE — 97116 GAIT TRAINING THERAPY: CPT

## 2017-08-18 PROCEDURE — 94660 CPAP INITIATION&MGMT: CPT

## 2017-08-18 RX ORDER — METOPROLOL TARTRATE 5 MG/5ML
5 INJECTION INTRAVENOUS ONCE
Status: COMPLETED | OUTPATIENT
Start: 2017-08-18 | End: 2017-08-18

## 2017-08-18 RX ORDER — METOPROLOL TARTRATE 5 MG/5ML
5 INJECTION INTRAVENOUS ONCE
Status: DISCONTINUED | OUTPATIENT
Start: 2017-08-18 | End: 2017-08-18

## 2017-08-18 RX ORDER — FUROSEMIDE 40 MG/1
40 TABLET ORAL 2 TIMES DAILY
Status: DISCONTINUED | OUTPATIENT
Start: 2017-08-18 | End: 2017-08-19 | Stop reason: HOSPADM

## 2017-08-18 RX ADMIN — TAZOBACTAM SODIUM AND PIPERACILLIN SODIUM 4.5 G: 500; 4 INJECTION, SOLUTION INTRAVENOUS at 08:35

## 2017-08-18 RX ADMIN — LEVALBUTEROL HYDROCHLORIDE 1.25 MG: 1.25 SOLUTION, CONCENTRATE RESPIRATORY (INHALATION) at 19:33

## 2017-08-18 RX ADMIN — TAZOBACTAM SODIUM AND PIPERACILLIN SODIUM 4.5 G: 500; 4 INJECTION, SOLUTION INTRAVENOUS at 21:14

## 2017-08-18 RX ADMIN — ENOXAPARIN SODIUM 40 MG: 40 INJECTION SUBCUTANEOUS at 08:35

## 2017-08-18 RX ADMIN — FUROSEMIDE 40 MG: 40 TABLET ORAL at 21:54

## 2017-08-18 RX ADMIN — LEVALBUTEROL HYDROCHLORIDE 1.25 MG: 1.25 SOLUTION, CONCENTRATE RESPIRATORY (INHALATION) at 12:06

## 2017-08-18 RX ADMIN — METOPROLOL SUCCINATE 100 MG: 100 TABLET, FILM COATED, EXTENDED RELEASE ORAL at 21:16

## 2017-08-18 RX ADMIN — IPRATROPIUM BROMIDE 0.5 MG: 0.5 SOLUTION RESPIRATORY (INHALATION) at 19:33

## 2017-08-18 RX ADMIN — METOPROLOL SUCCINATE 100 MG: 100 TABLET, FILM COATED, EXTENDED RELEASE ORAL at 08:34

## 2017-08-18 RX ADMIN — IPRATROPIUM BROMIDE 0.5 MG: 0.5 SOLUTION RESPIRATORY (INHALATION) at 07:33

## 2017-08-18 RX ADMIN — LEVALBUTEROL HYDROCHLORIDE 1.25 MG: 1.25 SOLUTION, CONCENTRATE RESPIRATORY (INHALATION) at 00:02

## 2017-08-18 RX ADMIN — GUAIFENESIN 1200 MG: 600 TABLET, EXTENDED RELEASE ORAL at 21:16

## 2017-08-18 RX ADMIN — IPRATROPIUM BROMIDE 0.5 MG: 0.5 SOLUTION RESPIRATORY (INHALATION) at 00:02

## 2017-08-18 RX ADMIN — LEVOFLOXACIN 750 MG: 750 TABLET, FILM COATED ORAL at 08:34

## 2017-08-18 RX ADMIN — TAZOBACTAM SODIUM AND PIPERACILLIN SODIUM 4.5 G: 500; 4 INJECTION, SOLUTION INTRAVENOUS at 16:54

## 2017-08-18 RX ADMIN — METOPROLOL TARTRATE 5 MG: 5 INJECTION INTRAVENOUS at 01:07

## 2017-08-18 RX ADMIN — DILTIAZEM HYDROCHLORIDE 180 MG: 180 CAPSULE, EXTENDED RELEASE ORAL at 08:34

## 2017-08-18 RX ADMIN — FUROSEMIDE 40 MG: 10 INJECTION, SOLUTION INTRAMUSCULAR; INTRAVENOUS at 08:35

## 2017-08-18 RX ADMIN — GUAIFENESIN 1200 MG: 600 TABLET, EXTENDED RELEASE ORAL at 08:34

## 2017-08-18 RX ADMIN — TAZOBACTAM SODIUM AND PIPERACILLIN SODIUM 4.5 G: 500; 4 INJECTION, SOLUTION INTRAVENOUS at 02:52

## 2017-08-18 RX ADMIN — QUETIAPINE FUMARATE 400 MG: 200 TABLET, FILM COATED ORAL at 21:16

## 2017-08-18 RX ADMIN — METOPROLOL TARTRATE 5 MG: 5 INJECTION INTRAVENOUS at 03:11

## 2017-08-18 RX ADMIN — LEVALBUTEROL HYDROCHLORIDE 1.25 MG: 1.25 SOLUTION, CONCENTRATE RESPIRATORY (INHALATION) at 07:33

## 2017-08-18 RX ADMIN — ASPIRIN 81 MG: 81 TABLET ORAL at 08:34

## 2017-08-18 RX ADMIN — IPRATROPIUM BROMIDE 0.5 MG: 0.5 SOLUTION RESPIRATORY (INHALATION) at 12:06

## 2017-08-18 NOTE — THERAPY TREATMENT NOTE
Acute Care - Physical Therapy Treatment Note  [facility]     Patient Name: Froy Hoyos  : 1950  MRN: [id number]  Today's Date: 2017  Onset of Illness/Injury or Date of Surgery Date: 08/15/17  Date of Referral to PT: 17  Referring Physician: Dr Torres    Admit Date: 8/15/2017    Visit Dx:    ICD-10-CM ICD-9-CM   1. Impaired mobility and ADLs Z74.09 799.89   2. Impaired functional mobility, balance, gait, and endurance Z74.09 V49.89     Patient Active Problem List   Diagnosis   • Chronic congestive heart failure   • HCAP (healthcare-associated pneumonia)               Adult Rehabilitation Note       17 1409 17 1058       Rehab Assessment/Intervention    Discipline physical therapy assistant  -LG occupational therapist  -ND     Document Type therapy note (daily note)  -LG therapy note (daily note)  -ND     Subjective Information agree to therapy;no complaints  -LG agree to therapy;complains of;dyspnea  -ND     Precautions/Limitations fall precautions;oxygen therapy device and L/min  -LG fall precautions;oxygen therapy device and L/min  -ND     Recorded by [LG] Luciano Guerrero PTA [ND] Stacy Flores, OTR/L     Vital Signs    Pre SpO2 (%) 93  -LG      O2 Delivery Pre Treatment supplemental O2   2L  -LG      Intra SpO2 (%) 94  -LG      O2 Delivery Intra Treatment supplemental O2   2L  -LG      Post SpO2 (%) 93  -LG      O2 Delivery Post Treatment supplemental O2   2L  -LG      Pre Patient Position Supine  -LG      Intra Patient Position Standing  -LG      Post Patient Position Sitting  -LG      Recorded by [LG] Luciano Guerrero PTA      Pain Assessment    Pain Assessment No/denies pain  -LG No/denies pain  -ND     Recorded by [LG] Luciano Guerrero PTA [ND] tSacy Flores, OTR/L     Bed Mobility, Assessment/Treatment    Bed Mobility, Assistive Device  bed rails;head of bed elevated  -ND     Bed Mob, Supine to Sit, [place]  conditional independence  -ND     Bed Mob, Sit to Supine,  [place]  --   chair  -ND     Bed Mobility, Impairments  impaired balance;strength decreased  -ND     Bed Mobility, Comment sitting eob  -LG Pt. sat EOB approx 8min and sang Emmanuel Penn songs with therapist to enhance pulmonary hygiene and act tolerance  -ND     Recorded by [LG] Luciano Guerrero PTA [ND] Stacy Flores OTR/L     Transfer Assessment/Treatment    Transfers, Sit-Stand [place] supervision required  -LG verbal cues required;nonverbal cues required (demo/gesture);contact guard assist  -ND     Transfers, Stand-Sit [place] supervision required  -LG verbal cues required;nonverbal cues required (demo/gesture);contact guard assist  -ND     Toilet Transfer, [place] contact guard assist  -LG      Transfer, Safety Issues step length decreased  -LG step length decreased  -ND     Transfer, Impairments strength decreased;impaired balance  -LG strength decreased;impaired balance  -ND     Recorded by [LG] Luciano Guerrero PTA [ND] Stacy Flores OTR/L     Gait Assessment/Treatment    Gait, [place] Level contact guard assist  -LG      Gait, Distance (Feet) 45   45, 50', 50' 45, 2 short sitting breaks, 1 standing break  -LG      Gait, Gait Deviations bilateral:;carmelo decreased;narrow base;step length decreased;toe-to-floor clearance decreased  -LG      Gait, Safety Issues step length decreased  -LG      Gait, Impairments strength decreased   impulsive  -LG      Gait, Comment Refused to use RW, state it wears him out too much  -LG      Recorded by [LG] Luciano Guerrero PTA      Functional Mobility    Functional Mobility- Ind. Level  verbal cues required;nonverbal cues required (demo/gesture);contact guard assist  -ND     Functional Mobility-Distance (Feet)  --   IN ROOM  -ND     Functional Mobility- Safety Issues  step length decreased;supplemental O2  -ND     Recorded by  [ND] Stacy Flores OTR/L     Grooming Assessment/Training    Grooming Assess/Train, Assistive Device  other (see  comments)   wash face EOB  -ND     Grooming Assess/Train, Position  edge of bed  -ND     Grooming Assess/Train, Indepen Level  set up required  -ND     Grooming Assess/Train, Impairments  impaired balance;strength decreased  -ND     Recorded by  [ND] Stacy Flores OTR/L     Therapy Exercises    Bilateral Lower Extremities AROM:;20 reps;sitting;ankle pumps/circles;hip flexion;LAQ   2 sets of 10 reps  -LG      Recorded by [LG] Luciano Guerrero PTA      Positioning and Restraints    Pre-Treatment Position in bed  -LG in bed  -ND     Post Treatment Position chair  -LG chair  -ND     In Chair sitting;call light within reach;encouraged to call for assist;notified nsg  -LG sitting;call light within reach;encouraged to call for assist;legs elevated  -ND     Recorded by [LG] Luciano Guerrero PTA [ND] Stacy Flores OTR/L       User Key  (r) = Recorded By, (t) = Taken By, (c) = Cosigned By    Initials Name Effective Dates    LG uLciano Guerrero PTA 08/02/16 -     ND JOSE ROBERTO Ireland/NEGAR 10/21/16 -                 IP PT Goals       08/17/17 1612          Bed Mobility PT LTG    Bed Mobility PT LTG, Date Established 08/17/17  -PB (r) MS (t) PB (c)      Bed Mobility PT LTG, Time to Achieve by discharge  -PB (r) MS (t) PB (c)      Bed Mobility PT LTG, Activity Type all bed mobility  -PB (r) MS (t) PB (c)      Bed Mobility PT LTG, [place] Level conditional independence  -PB (r) MS (t) PB (c)      Bed Mobility PT Goal  LTG, Assist Device bed rails  -PB (r) MS (t) PB (c)      Transfer Training PT LTG    Transfer Training PT LTG, Date Established 08/17/17  -PB (r) MS (t) PB (c)      Transfer Training PT LTG, Time to Achieve by discharge  -PB (r) MS (t) PB (c)      Transfer Training PT LTG, Activity Type bed to chair /chair to bed;sit to stand/stand to sit  -PB (r) MS (t) PB (c)      Transfer Training PT LTG, [place] Level supervision required  -PB (r) MS (t) PB (c)      Gait Training PT LTG    Gait Training Goal PT LTG,  Date Established 08/17/17  -PB (r) MS (t) PB (c)      Gait Training Goal PT LTG, Time to Achieve by discharge  -PB (r) MS (t) PB (c)      Gait Training Goal PT LTG, [place] Level supervision required  -PB (r) MS (t) PB (c)      Gait Training Goal PT LTG, Distance to Achieve 200'  -PB (r) MS (t) PB (c)      Strength Goal PT LTG    Strength Goal PT LTG, Date Established 08/17/17  -PB (r) MS (t) PB (c)      Strength Goal PT LTG, Time to Achieve by discharge  -PB (r) MS (t) PB (c)      Strength Goal PT LTG, Functional Goal Supine and sitting LE exercises x20 reps  -PB (r) MS (t) PB (c)        User Key  (r) = Recorded By, (t) = Taken By, (c) = Cosigned By    Initials Name Provider Type    PB Gilberto Loza, PT DPT Physical Therapist    MS Tanesha Galaviz, PT Student PT Student          Physical Therapy Education     Title: PT OT SLP Therapies (Active)     Topic: Physical Therapy (Active)     Point: Mobility training (Done)    Learning Progress Summary    Learner Readiness Method Response Comment Documented by Status   Patient Acceptance E VU,NR Need for assist, plans and goals for therapy, benefits of activity MS 08/17/17 1617 Done               Point: Precautions (Done)    Learning Progress Summary    Learner Readiness Method Response Comment Documented by Status   Patient Acceptance E VU,NR Need for assist, plans and goals for therapy, benefits of activity MS 08/17/17 1617 Done                      User Key     Initials Effective Dates Name Provider Type Discipline    MS 07/19/17 -  Tanesha Galaviz, PT Student PT Student PT                    PT Recommendation and Plan  Anticipated Discharge Disposition: skilled nursing facility  Planned Therapy Interventions: balance training, bed mobility training, gait training, home exercise program, patient/family education, strengthening, transfer training  PT Frequency: daily, 2 times/day, per priority policy             Outcome Measures       08/18/17 1100 08/17/17  1500 08/17/17 1421    How much help from another person do you currently need...    Turning from your back to your side while in flat bed without using bedrails?   4  -PB (r) MS (t) PB (c)    Moving from lying on back to sitting on the side of a flat bed without bedrails?   3  -PB (r) MS (t) PB (c)    Moving to and from a bed to a chair (including a wheelchair)?   3  -PB (r) MS (t) PB (c)    Standing up from a chair using your arms (e.g., wheelchair, bedside chair)?   3  -PB (r) MS (t) PB (c)    Climbing 3-5 steps with a railing?   3  -PB (r) MS (t) PB (c)    To walk in hospital room?   3  -PB (r) MS (t) PB (c)    AM-PAC 6 Clicks Score   19  -PB (r) MS (t)    How much help from another is currently needed...    Putting on and taking off regular lower body clothing? 3  -ND 2  -AC     Bathing (including washing, rinsing, and drying) 3  -ND 3  -AC     Toileting (which includes using toilet bed pan or urinal) 3  -ND 3  -AC     Putting on and taking off regular upper body clothing 4  -ND 4  -AC     Taking care of personal grooming (such as brushing teeth) 4  -ND 4  -AC     Eating meals 4  -ND 4  -AC     Score 21  -ND 20  -AC     Functional Assessment    Outcome Measure Options  AM-PAC 6 Clicks Daily Activity (OT)  -AC AM-PAC 6 Clicks Basic Mobility (PT)  -PB (r) MS (t) PB (c)      User Key  (r) = Recorded By, (t) = Taken By, (c) = Cosigned By    Initials Name Provider Type    PB Gilberto Loza, PT DPT Physical Therapist    AC Anisha Zurita, OTR/L Occupational Therapist    ND Stacy Flores, OTR/L Occupational Therapist    MS Tanesha Galaviz, PT Student PT Student           Time Calculation:         PT Charges       08/18/17 1409          Time Calculation    Start Time 1409  -LG      Stop Time 1436  -LG      Time Calculation (min) 27 min  -LG      PT Received On 08/18/17  -      PT Goal Re-Cert Due Date 08/27/17  -LG      Time Calculation- PT    Total Timed Code Minutes- PT 27 minute(s)  -LG        User Key  (r)  = Recorded By, (t) = Taken By, (c) = Cosigned By    Initials Name Provider Type     Luciano Guerrero PTA Physical Therapy Assistant          Therapy Charges for Today     Code Description Service Date Service Provider Modifiers Qty    86937629585 HC GAIT TRAINING EA 15 MIN 8/18/2017 Luciano Guerrero PTA GP 1    11184378064 HC PT THER PROC EA 15 MIN 8/18/2017 Luciano Guerrero PTA GP 1          PT G-Codes  Outcome Measure Options: AM-PAC 6 Clicks Daily Activity (OT)  Score: 19  Functional Limitation: Mobility: Walking and moving around  Mobility: Walking and Moving Around Current Status (): At least 20 percent but less than 40 percent impaired, limited or restricted  Mobility: Walking and Moving Around Goal Status (): At least 1 percent but less than 20 percent impaired, limited or restricted    Luciano Guerrero PTA  8/18/2017

## 2017-08-18 NOTE — PAYOR COMM NOTE
"[facility]    [place]   [phone]  OR  FAX  [phone]    Braxton Hoyos (66 y.o. Male)     Date of Birth Social Security Number Address Home Phone MRN    1950  [address] [place] [address] [phone] [id number]    Taoism Marital Status          None        Admission Date Admission Type Admitting Provider Attending Provider Department, Room/Bed    8/15/17 Urgent Clint Torres DO Hancock, John C, DO [facility] 4C, 492/1    Discharge Date Discharge Disposition Discharge Destination                      Attending Provider: Clint Torres DO     Allergies:  No Known Allergies    Isolation:  None   Infection:  None   Code Status:  FULL    Ht:  67\" (170.2 cm)   Wt:  220 lb 12.8 oz (100 kg)    Admission Cmt:  None   Principal Problem:  None                Active Insurance as of 8/15/2017     Primary Coverage     Payor Plan Insurance Group Employer/Plan Group    [id number]      Payor Plan Address Payor Plan Phone Number Effective From Effective To    [employer] [phone] 8/15/2017     [address] [place] [id number]       Subscriber Name Subscriber Birth Date Member ID       BRAXTON HOYOS 1950 [id number]                 Emergency Contacts      (Rel.) Home Phone Work Phone Mobile Phone    Roma Liu/Braxton (Son) [phone] -- --    Alma Rosa Hoyos (Daughter) -- -- [phone]               Physician Progress Notes (last 24 hours) (Notes from 8/17/2017 11:33 AM through 8/18/2017 11:33 AM)      KEERTHI Posadas at 8/18/2017 10:15 AM  Version 1 of 1             PULMONARY AND CRITICAL CARE PROGRESS NOTE - [facility]    Patient: Braxton Hoyos  1950   MR# [id number]   Acct# [id number]  08/18/17   10:15 AM  Referring Provider: Clint Torres DO    Chief Complaint: Dyspnea     Interval history: Patient resting comfortably at this time on supplemental oxygen. He reports that he is " feeling much better. Much less drowsy today. No other aggravating or allevitating factors.     Meds:    aspirin 81 mg Oral Daily   diltiaZEM  mg Oral Q24H   enoxaparin 40 mg Subcutaneous Q24H   furosemide 40 mg Intravenous Q12H   guaiFENesin 1,200 mg Oral Q12H   ipratropium 0.5 mg Nebulization Q6H - RT   levalbuterol 1.25 mg Nebulization Q6H - RT   levoFLOXacin 750 mg Oral Q24H   metoprolol succinate  mg Oral Q12H   piperacillin-tazobactam 4.5 g Intravenous Q6H   QUEtiapine 400 mg Oral Nightly        Review of Systems:   Review of Systems   Unable to perform ROS: Other   Positive for cough and SOA, negative for hemoptysis  Negative for chest pain and palpitations  All other systems reviewed and are negative    Physical Exam:  SpO2 Readings from Last 3 Encounters:   08/18/17 95%   10/02/16 93%   09/26/16 96%     Temp:  [96.5 °F (35.8 °C)-97.9 °F (36.6 °C)] 96.7 °F (35.9 °C)  Heart Rate:  [] 104  Resp:  [16-21] 16  BP: (112-153)/(56-92) 146/80    Intake/Output Summary (Last 24 hours) at 08/18/17 1015  Last data filed at 08/18/17 0939   Gross per 24 hour   Intake              980 ml   Output             2000 ml   Net            -1020 ml     Physical Exam   Constitutional: He appears well-developed and well-nourished. He is sleeping. No distress. Nasal cannula in place.   HENT:   Head: Normocephalic and atraumatic.   Eyes: Conjunctivae and EOM are normal. Pupils are equal, round, and reactive to light. No scleral icterus.   Neck: Normal range of motion. Neck supple.   Cardiovascular: Normal rate, regular rhythm and normal heart sounds.  Exam reveals no friction rub.    No murmur heard.  Pulmonary/Chest: Effort normal. No respiratory distress. He has decreased breath sounds. He has no wheezes. He has no rales.   Abdominal: Soft. Bowel sounds are normal. He exhibits no distension. There is no tenderness.   Musculoskeletal: Normal range of motion. He exhibits no edema.   Skin: Skin is warm and dry.    Psychiatric: He has a normal mood and affect. His behavior is normal.   Nursing note and vitals reviewed.    Laboratory Data:    Results from last 7 days  Lab Units 08/18/17  0635 08/16/17  0240 08/15/17  0851   WBC 10*3/mm3 6.05 6.96 9.72   HEMOGLOBIN g/dL 9.5* 9.6* 9.8*   PLATELETS 10*3/mm3 136 114* 129*       Results from last 7 days  Lab Units 08/18/17  0636 08/17/17  1110 08/17/17  0139 08/16/17  0241   SODIUM mmol/L 143 145  --  145   SODIUM, ARTERIAL mmol/L  --   --  138.6  --    POTASSIUM mmol/L 3.8 4.7  --  3.6   BUN mg/dL 17 13  --  16   CREATININE mg/dL 1.02 1.09  --  1.07       Results from last 7 days  Lab Units 08/17/17  0139 08/16/17  0337 08/15/17  0331 08/15/17  0226   PH, ARTERIAL pH units 7.396 7.406 7.372 7.286*   PCO2, ARTERIAL mm Hg 64.7* 59.5* 60.2* 77.3*   PO2 ART mm Hg 54.2* 58.0* 57.7* 96.2   FIO2 %  --  22 21 40     Blood Culture   Date Value Ref Range Status   08/15/2017 No growth at less than 24 hours  Preliminary   08/15/2017 No growth at less than 24 hours  Preliminary     Urine Culture   Date Value Ref Range Status   08/15/2017 No growth at 24 hours  Preliminary   08/15/2017 No growth at 24 hours  Preliminary     Pulmonary Assessment:  1. Acute/chronic hypercapnic/hypoxemic respiratory failure  2. Fever  3. Systolic heart failure  4. Volume overload  5. Anemia  6. Atrial fibrillation  7. Hypertension  8. CVA     Recommend:   · Continue supplemental O2 for O2 sat 88%-92%, continue BiPAP as needed  · Zosyn/vancomycin d4, Levaquin PO d3  · Continue nebs/mucinex/IS   · Signing off. Please feel free to call back if needed further    Electronically signed by KEERTHI Tam, 08/18/17, 10:15 AM               Electronically signed by KEERTHI Posadas at 8/18/2017 10:18 AM      Clint Torres DO at 8/18/2017 10:21 AM  Version 1 of 1             [facility]  INPATIENT PROGRESS NOTE    Length of Stay: 3  Date of Admission:  "8/15/2017  Primary Care Physician: [facility]    Subjective   Chief Complaint: shortness of breath   HPI   He has improved significantly over the last 24 hours. No complaints of shortness of breath or chest discomfort.  He wants to go home today, but I told him if he does well today that he can go tomorrow.  He tells me that he lives at home with his son.  He does not want home health.    He wants to make sure that he is home for the \"mirage.\"  I asked him if he meant the eclipse and he said yes.    Review of Systems   All pertinent negatives and positives are as above. All other systems have been reviewed and are negative unless otherwise stated.     Objective    Temp:  [96.5 °F (35.8 °C)-97.9 °F (36.6 °C)] 96.7 °F (35.9 °C)  Heart Rate:  [] 104  Resp:  [16-21] 16  BP: (112-153)/(56-92) 146/80  Physical Exam  Constitutional: Chronically ill-appearing.  On cannula.  Up in a chair, watching TV, and drinking a Coke. Discussed with his nurse, Brissa.   Head: Normocephalic and atraumatic. Edentulous.  Eyes: Conjunctivae and EOM are normal. Pupils are equal, round, and reactive to light.   Neck: Neck supple. No JVD present.   Cardiovascular: Normal rate, regular rhythm, normal heart sounds and intact distal pulses.  Exam reveals no gallop and no friction rub.  No murmur heard.  Pulmonary/Chest: Effort normal and breath sounds normal. No respiratory distress. He has no wheezes. He has no rales. He exhibits no tenderness. Diminished at the bases, but clear.   Abdominal: Soft. Bowel sounds are normal. He exhibits no distension. There is no tenderness. There is no rebound and no guarding.   Musculoskeletal: Normal range of motion. He exhibits no edema, tenderness or deformity.   Neurological: He is alert. He displays normal reflexes. No cranial nerve deficit. He exhibits normal muscle tone.   Skin: Skin is warm and dry. No rash noted.   Psychiatric: Much brighter affect today and conversant.    Intake/Output Summary " (Last 24 hours) at 08/18/17 1023  Last data filed at 08/18/17 0939   Gross per 24 hour   Intake              980 ml   Output             2000 ml   Net            -1020 ml     Results Review:  I have reviewed the labs, radiology results, and diagnostic studies.    Laboratory Data:     Results from last 7 days  Lab Units 08/18/17  0635 08/16/17  0240 08/15/17  0851   WBC 10*3/mm3 6.05 6.96 9.72   HEMOGLOBIN g/dL 9.5* 9.6* 9.8*   HEMATOCRIT % 30.1* 30.9* 31.5*   PLATELETS 10*3/mm3 136 114* 129*       Results from last 7 days  Lab Units 08/18/17  0636 08/17/17  1110 08/17/17  0139 08/16/17  0241 08/15/17  0851   SODIUM mmol/L 143 145  --  145 145   SODIUM, ARTERIAL mmol/L  --   --  138.6  --   --    POTASSIUM mmol/L 3.8 4.7  --  3.6 3.7   CHLORIDE mmol/L 101 105  --  101 102   CO2 mmol/L 31.0 33.0*  --  38.0* 34.0*   BUN mg/dL 17 13  --  16 17   CREATININE mg/dL 1.02 1.09  --  1.07 1.21   CALCIUM mg/dL 8.8 8.5  --  8.1* 8.2*   BILIRUBIN mg/dL  --   --   --   --  0.5   ALK PHOS U/L  --   --   --   --  48   ALT (SGPT) U/L  --   --   --   --  27   AST (SGOT) U/L  --   --   --   --  15   GLUCOSE mg/dL 112* 94  --  104* 95     I have reviewed the patient current medications.     Assessment/Plan   Assessment:   1.  Acute on chronic hypoxic and hypercarbic respiratory failure.  2.  Healthcare associated pneumonia.  3.  Atrial fibrillation, not on chronic anticoagulation.   4.  Acute on chronic systolic heart failure with ejection fraction 40-45%.  5.  Coronary artery disease, exact details unknown, nothing specific in [facility] notes.  6.  Systemic arterial hypertension.  7.  Chronic kidney disease, stage III.  8.  Remote tobacco abuse with likely underlying chronic obstructive pulmonary disease.  9.  History of polysubstance abuse.  10.  Bipolar disorder.  11.  Normocytic anemia.  12.  Thrombocytopenia, mild, chronic.      Plan:   Continue BiPAP as needed.  Wean oxygen as tolerated.      Continue broad-spectrum antibiotics.   Changed Levaquin to oral on 8/16.  De-escalated vancomycin on 8/17. I will de-escalate Zosyn tomorrow. Mucinex, inhaled bronchodilators (Xopenex and Atrovent).      Change Lasix to 40 mg oral twice per day.  Monitor electrolytes and renal function with doing so.  Strict intake and output.      Continue rate controlling medications and aspirin.      Lovenox for DVT prophylaxis.    PT/OT.     Discharge Planning: I expect the patient to be discharged home tomorrow.    Clint Torres DO   08/18/17   10:21 AM       Electronically signed by Clint Torres DO at 8/18/2017 10:35 AM

## 2017-08-18 NOTE — PROGRESS NOTES
Continued Stay Note  [facility]     Patient Name: Froy Hoyos  MRN: [id number]  Today's Date: 8/18/2017    Admit Date: 8/15/2017          Discharge Plan       08/18/17 1444    Case Management/Social Work Plan    Plan Home with [facility]     Patient/Family In Agreement With Plan yes    Additional Comments Pt may go home tomorrow. Will need to notify [facility] at d/c.               Discharge Codes     None            HARSH Paniagua

## 2017-08-18 NOTE — THERAPY TREATMENT NOTE
Acute Care - Occupational Therapy Treatment Note  [facility]     Patient Name: Froy Hoyos  : 1950  MRN: [id number]  Today's Date: 2017  Onset of Illness/Injury or Date of Surgery Date: 08/15/17  Date of Referral to OT: 17  Referring Physician: Dr Torres      Admit Date: 8/15/2017    Visit Dx:     ICD-10-CM ICD-9-CM   1. Impaired mobility and ADLs Z74.09 799.89   2. Impaired functional mobility, balance, gait, and endurance Z74.09 V49.89     Patient Active Problem List   Diagnosis   • Chronic congestive heart failure   • HCAP (healthcare-associated pneumonia)             Adult Rehabilitation Note       17 1058          Rehab Assessment/Intervention    Discipline occupational therapist  -ND      Document Type therapy note (daily note)  -ND      Subjective Information agree to therapy;complains of;dyspnea  -ND      Precautions/Limitations fall precautions;oxygen therapy device and L/min  -ND      Recorded by [ND] Stacy Flores OTR/L      Pain Assessment    Pain Assessment No/denies pain  -ND      Recorded by [ND] JOSE ROBERTO Ireland/L      Bed Mobility, Assessment/Treatment    Bed Mobility, Assistive Device bed rails;head of bed elevated  -ND      Bed Mob, Supine to Sit, [place] conditional independence  -ND      Bed Mob, Sit to Supine, [place] --   chair  -ND      Bed Mobility, Impairments impaired balance;strength decreased  -ND      Bed Mobility, Comment Pt. sat EOB approx 8min and sang [place] songs with therapist to enhance pulmonary hygiene and act tolerance  -ND      Recorded by [ND] Stacy Flores OTR/L      Transfer Assessment/Treatment    Transfers, Sit-Stand [place] verbal cues required;nonverbal cues required (demo/gesture);contact guard assist  -ND      Transfers, Stand-Sit [place] verbal cues required;nonverbal cues required (demo/gesture);contact guard assist  -ND      Transfer, Safety Issues step length decreased  -ND      Transfer,  Impairments strength decreased;impaired balance  -ND      Recorded by [ND] Stacy Flores OTR/L      Functional Mobility    Functional Mobility- Ind. Level verbal cues required;nonverbal cues required (demo/gesture);contact guard assist  -ND      Functional Mobility-Distance (Feet) --   IN ROOM  -ND      Functional Mobility- Safety Issues step length decreased;supplemental O2  -ND      Recorded by [ND] Stacy Flores OTR/L      Grooming Assessment/Training    Grooming Assess/Train, Assistive Device other (see comments)   wash face EOB  -ND      Grooming Assess/Train, Position edge of bed  -ND      Grooming Assess/Train, Indepen Level set up required  -ND      Grooming Assess/Train, Impairments impaired balance;strength decreased  -ND      Recorded by [ND] JOSE ROBERTO Ireland/L      Positioning and Restraints    Pre-Treatment Position in bed  -ND      Post Treatment Position chair  -ND      In Chair sitting;call light within reach;encouraged to call for assist;legs elevated  -ND      Recorded by [ND] JOSE ROBERTO Ireland/L        User Key  (r) = Recorded By, (t) = Taken By, (c) = Cosigned By    Initials Name Effective Dates    ND JOSE ROBERTO Ireland/NEGAR 10/21/16 -                 OT Goals       08/17/17 1508          Strength OT LTG    Strength Goal OT LTG, Date Established 08/17/17  -AC      Strength Goal OT LTG, Time to Achieve by discharge  -AC      Strength Goal OT LTG, Measure to Achieve Pt will be I BUE HEP to increase strength and endurance for daily tasks  -AC      Bathing OT LTG    Bathing Goal OT LTG, Date Established 08/17/17  -AC      Bathing Goal OT LTG, Time to Achieve by discharge  -AC      Bathing Goal OT LTG, Activity Type upper body bathing;lower body bathing  -AC      Bathing Goal OT LTG, [place] conditional independence  -AC      LB Dressing OT LTG    LB Dressing Goal OT LTG, Date Established 08/17/17  -AC      LB Dressing Goal OT LTG, Time to Achieve by discharge  -AC      LB  Dressing Goal OT LTG, [place] conditional independence  -AC      LB Dressing Goal OT LTG, Additional Goal pt may need AE to complete LE dressing  -AC        User Key  (r) = Recorded By, (t) = Taken By, (c) = Cosigned By    Initials Name Provider Type    SANDEEP Zurita, OTR/L Occupational Therapist          Occupational Therapy Education     Title: PT OT SLP Therapies (Active)     Topic: Occupational Therapy (Done)     Point: ADL training (Done)    Description: Instruct learner(s) on proper safety adaptation and remediation techniques during self care or transfers.   Instruct in proper use of assistive devices.    Learning Progress Summary    Learner Readiness Method Response Comment Documented by Status   Patient Acceptance E VU,NR Pt. educated on role of OT, safe t/f, beenfit of activity, adaptive breathing, progression with poc ND 08/18/17 1119 Done    Acceptance E VU Pt educated on benefits of participation with OT to increase I for d/c home and safety AC 08/17/17 1504 Done               Point: Home exercise program (Done)    Description: Instruct learner(s) on appropriate technique for monitoring, assisting and/or progressing therapeutic exercises/activities.    Learning Progress Summary    Learner Readiness Method Response Comment Documented by Status   Patient Acceptance E VU,NR Pt. educated on role of OT, safe t/f, beenfit of activity, adaptive breathing, progression with poc ND 08/18/17 1119 Done    Acceptance E VU Pt educated on benefits of participation with OT to increase I for d/c home and safety AC 08/17/17 1504 Done               Point: Body mechanics (Done)    Description: Instruct learner(s) on proper positioning and spine alignment during self-care, functional mobility activities and/or exercises.    Learning Progress Summary    Learner Readiness Method Response Comment Documented by Status   Patient Acceptance E VU,NR Pt. educated on role of OT, safe t/f, beenfit of activity,  adaptive breathing, progression with poc ND 08/18/17 1119 Done    Acceptance E VU Pt educated on benefits of participation with OT to increase I for d/c home and safety AC 08/17/17 1504 Done                      User Key     Initials Effective Dates Name Provider Type Discipline    AC 09/07/16 -  Anisha Zurita, OTR/L Occupational Therapist OT    ND 10/21/16 -  Stacy Flores, OTR/L Occupational Therapist OT                  OT Recommendation and Plan  Anticipated Equipment Needs At Discharge: raised toilet seat, shower chair, two wheeled walker, rollator  Anticipated Discharge Disposition: home with home health  Planned Therapy Interventions: activity intolerance, ADL retraining, IADL retraining, balance training, joint mobilization, home exercise program, energy conservation, strengthening  Therapy Frequency: 3-5 times/wk  Plan of Care Review  Plan Of Care Reviewed With: patient  Progress: progress toward functional goals as expected  Outcome Summary/Follow up Plan: OT tx completed. Pt. completed bed mob with cond I. Pt. CGA for sit to stand t/f and funcctional mob. Pt. sat EOB approx 8 min practicing good pulmonary hygiene and act tolerance by singing mohsen poole while practicing adaptive breathing. 8/18/17 1126        Outcome Measures       08/18/17 1100 08/17/17 1500 08/17/17 1421    How much help from another person do you currently need...    Turning from your back to your side while in flat bed without using bedrails?   4  -PB (r) MS (t) PB (c)    Moving from lying on back to sitting on the side of a flat bed without bedrails?   3  -PB (r) MS (t) PB (c)    Moving to and from a bed to a chair (including a wheelchair)?   3  -PB (r) MS (t) PB (c)    Standing up from a chair using your arms (e.g., wheelchair, bedside chair)?   3  -PB (r) MS (t) PB (c)    Climbing 3-5 steps with a railing?   3  -PB (r) MS (t) PB (c)    To walk in hospital room?   3  -PB (r) MS (t) PB (c)    AM-PAC 6 Clicks Score   19  -PB  (r) MS (t)    How much help from another is currently needed...    Putting on and taking off regular lower body clothing? 3  -ND 2  -AC     Bathing (including washing, rinsing, and drying) 3  -ND 3  -AC     Toileting (which includes using toilet bed pan or urinal) 3  -ND 3  -AC     Putting on and taking off regular upper body clothing 4  -ND 4  -AC     Taking care of personal grooming (such as brushing teeth) 4  -ND 4  -AC     Eating meals 4  -ND 4  -AC     Score 21  -ND 20  -AC     Functional Assessment    Outcome Measure Options  AM-PAC 6 Clicks Daily Activity (OT)  -AC AM-PAC 6 Clicks Basic Mobility (PT)  -PB (r) MS (t) PB (c)      User Key  (r) = Recorded By, (t) = Taken By, (c) = Cosigned By    Initials Name Provider Type    PB Gilberto Loza, PT DPT Physical Therapist    AC Anisha Zurita, OTR/L Occupational Therapist    ND Stacy Flores, OTR/L Occupational Therapist    MS Tanesha Galaviz, PT Student PT Student           Time Calculation:         Time Calculation- OT       08/18/17 1127          Time Calculation- OT    OT Start Time 1058  -ND      OT Stop Time 1125  -ND      OT Time Calculation (min) 27 min  -ND      OT Received On 08/18/17  -ND        User Key  (r) = Recorded By, (t) = Taken By, (c) = Cosigned By    Initials Name Provider Type    ND Stacy Flores OTR/L Occupational Therapist           Therapy Charges for Today     Code Description Service Date Service Provider Modifiers Qty    41792272351  OT SELF CARE/MGMT/TRAIN EA 15 MIN 8/18/2017 Stacy Flores OTR/L GO 1    [id number]  OT THERAPEUTIC ACT EA 15 MIN 8/18/2017 Stacy Flores OTR/L GO 1          OT G-codes  OT Professional Judgement Used?: Yes  OT Functional Scales Options: AM-PAC 6 Clicks Daily Activity (OT)  Score: 20  Functional Limitation: Self care  Self Care Current Status (): At least 20 percent but less than 40 percent impaired, limited or restricted  Self Care Goal Status (): At least 1 percent but  less than 20 percent impaired, limited or restricted    Stacy Flores, OTR/L  8/18/2017

## 2017-08-18 NOTE — PLAN OF CARE
Problem: Patient Care Overview (Adult)  Goal: Plan of Care Review  Outcome: Ongoing (interventions implemented as appropriate)    08/18/17 1120   Coping/Psychosocial Response Interventions   Plan Of Care Reviewed With patient   Patient Care Overview   Progress progress toward functional goals as expected   Outcome Evaluation   Outcome Summary/Follow up Plan OT tx completed. Pt. completed bed mob with cond I. Pt. CGA for sit to stand t/f and funcctional mob. Pt. sat EOB approx 8 min practicing good pulmonary hygiene and act tolerance by singing mohsen poole while practicing adaptive breathing. 8/18/17 1120

## 2017-08-18 NOTE — PLAN OF CARE
Problem: Patient Care Overview (Adult)  Goal: Plan of Care Review  Outcome: Ongoing (interventions implemented as appropriate)    08/18/17 1409   Coping/Psychosocial Response Interventions   Plan Of Care Reviewed With patient   Patient Care Overview   Progress progress toward functional goals is gradual   Outcome Evaluation   Outcome Summary/Follow up Plan Pt sitting eob upon entering room. Supervision for sit to stand, CGA for gait 45', 50', 50', 45' with 2 sitting rest breaks, 1 standing rest break, O2 @ 3L NC stayed around 93%-94%. Pt would benefit from SNF for continued strength and balance training.

## 2017-08-18 NOTE — PLAN OF CARE
Problem: Fall Risk (Adult)  Goal: Identify Related Risk Factors and Signs and Symptoms  Outcome: Ongoing (interventions implemented as appropriate)  Goal: Absence of Falls  Outcome: Ongoing (interventions implemented as appropriate)    Problem: Sepsis (Adult)  Goal: Signs and Symptoms of Listed Potential Problems Will be Absent or Manageable (Sepsis)  Outcome: Ongoing (interventions implemented as appropriate)    Problem: Respiratory Insufficiency (Adult)  Goal: Acid/Base Balance  Outcome: Ongoing (interventions implemented as appropriate)  Goal: Effective Ventilation  Outcome: Ongoing (interventions implemented as appropriate)    Problem: Skin Integrity Impairment, Risk/Actual (Adult)  Goal: Skin Integrity/Wound Healing  Outcome: Ongoing (interventions implemented as appropriate)    Problem: Patient Care Overview (Adult)  Goal: Plan of Care Review  Outcome: Ongoing (interventions implemented as appropriate)    08/18/17 0531   Coping/Psychosocial Response Interventions   Plan Of Care Reviewed With patient   Patient Care Overview   Progress no change   Outcome Evaluation   Outcome Summary/Follow up Plan Pt received 2 doses of IV Lopressor due to fact HR maintained above 160, medication had positive results with HR maintaining in low 100's. O2 sats stable. Receives IV abx. Will continue to monitor.        Goal: Adult Individualization and Mutuality  Outcome: Ongoing (interventions implemented as appropriate)  Goal: Discharge Needs Assessment  Outcome: Ongoing (interventions implemented as appropriate)

## 2017-08-18 NOTE — PROGRESS NOTES
PULMONARY AND CRITICAL CARE PROGRESS NOTE - [facility]    Patient: Froy Hoyos  1950   MR# [id number]   Acct# [id number]  08/18/17   10:15 AM  Referring Provider: Clint Torres DO    Chief Complaint: Dyspnea     Interval history: Patient resting comfortably at this time on supplemental oxygen. He reports that he is feeling much better. Much less drowsy today. No other aggravating or allevitating factors.     Meds:    aspirin 81 mg Oral Daily   diltiaZEM  mg Oral Q24H   enoxaparin 40 mg Subcutaneous Q24H   furosemide 40 mg Intravenous Q12H   guaiFENesin 1,200 mg Oral Q12H   ipratropium 0.5 mg Nebulization Q6H - RT   levalbuterol 1.25 mg Nebulization Q6H - RT   levoFLOXacin 750 mg Oral Q24H   metoprolol succinate  mg Oral Q12H   piperacillin-tazobactam 4.5 g Intravenous Q6H   QUEtiapine 400 mg Oral Nightly        Review of Systems:   Review of Systems   Unable to perform ROS: Other   Positive for cough and SOA, negative for hemoptysis  Negative for chest pain and palpitations  All other systems reviewed and are negative    Physical Exam:  SpO2 Readings from Last 3 Encounters:   08/18/17 95%   10/02/16 93%   09/26/16 96%     Temp:  [96.5 °F (35.8 °C)-97.9 °F (36.6 °C)] 96.7 °F (35.9 °C)  Heart Rate:  [] 104  Resp:  [16-21] 16  BP: (112-153)/(56-92) 146/80    Intake/Output Summary (Last 24 hours) at 08/18/17 1015  Last data filed at 08/18/17 0939   Gross per 24 hour   Intake              980 ml   Output             2000 ml   Net            -1020 ml     Physical Exam   Constitutional: He appears well-developed and well-nourished. He is sleeping. No distress. Nasal cannula in place.   HENT:   Head: Normocephalic and atraumatic.   Eyes: Conjunctivae and EOM are normal. Pupils are equal, round, and reactive to light. No scleral icterus.   Neck: Normal range of motion. Neck supple.   Cardiovascular: Normal rate, regular rhythm and normal heart sounds.  Exam reveals no friction rub.     No murmur heard.  Pulmonary/Chest: Effort normal. No respiratory distress. He has decreased breath sounds. He has no wheezes. He has no rales.   Abdominal: Soft. Bowel sounds are normal. He exhibits no distension. There is no tenderness.   Musculoskeletal: Normal range of motion. He exhibits no edema.   Skin: Skin is warm and dry.   Psychiatric: He has a normal mood and affect. His behavior is normal.   Nursing note and vitals reviewed.    Laboratory Data:    Results from last 7 days  Lab Units 08/18/17  0635 08/16/17  0240 08/15/17  0851   WBC 10*3/mm3 6.05 6.96 9.72   HEMOGLOBIN g/dL 9.5* 9.6* 9.8*   PLATELETS 10*3/mm3 136 114* 129*       Results from last 7 days  Lab Units 08/18/17  0636 08/17/17  1110 08/17/17  0139 08/16/17  0241   SODIUM mmol/L 143 145  --  145   SODIUM, ARTERIAL mmol/L  --   --  138.6  --    POTASSIUM mmol/L 3.8 4.7  --  3.6   BUN mg/dL 17 13  --  16   CREATININE mg/dL 1.02 1.09  --  1.07       Results from last 7 days  Lab Units 08/17/17  0139 08/16/17  0337 08/15/17  0331 08/15/17  0226   PH, ARTERIAL pH units 7.396 7.406 7.372 7.286*   PCO2, ARTERIAL mm Hg 64.7* 59.5* 60.2* 77.3*   PO2 ART mm Hg 54.2* 58.0* 57.7* 96.2   FIO2 %  --  22 21 40     Blood Culture   Date Value Ref Range Status   08/15/2017 No growth at less than 24 hours  Preliminary   08/15/2017 No growth at less than 24 hours  Preliminary     Urine Culture   Date Value Ref Range Status   08/15/2017 No growth at 24 hours  Preliminary   08/15/2017 No growth at 24 hours  Preliminary     Pulmonary Assessment:  1. Acute/chronic hypercapnic/hypoxemic respiratory failure  2. Fever  3. Systolic heart failure  4. Volume overload  5. Anemia  6. Atrial fibrillation  7. Hypertension  8. CVA     Recommend:   · Continue supplemental O2 for O2 sat 88%-92%, continue BiPAP as needed  · Zosyn/vancomycin d4, Levaquin PO d3  · Continue nebs/mucinex/IS   · Signing off. Please feel free to call back if needed further    Electronically signed by  Taty Campo, APRN, 08/18/17, 10:15 AM     Physician substantive contribution:  Pertinent symptoms/interval history include: he feels much better and is out of icu.  No fevers.  Respiratory exam shows pertinent findings of:diminished breath sounds, stable.  Plan includes: overall stable,  Taper/titrate/wean oxygen as tolerated.  Complete 6-7 days antibiotics.  I have seen and examined patient personally, performing a face-to-face diagnostic evaluation with plan of care reviewed and developed with APRN and nursing staff. I have addended and/or modified the above history of present illness, physical examination, and assessment and plan to reflect my findings and impressions. Essential elements of the care plan were discussed with APRN above.  Agree with findings and assessment/plan as documented above.    Electronically signed by Ronald Recio MD, on 8/18/2017, 1:58 PM

## 2017-08-18 NOTE — PLAN OF CARE
Problem: Fall Risk (Adult)  Goal: Identify Related Risk Factors and Signs and Symptoms  Outcome: Ongoing (interventions implemented as appropriate)  Goal: Absence of Falls  Outcome: Ongoing (interventions implemented as appropriate)    Problem: Sepsis (Adult)  Goal: Signs and Symptoms of Listed Potential Problems Will be Absent or Manageable (Sepsis)  Outcome: Ongoing (interventions implemented as appropriate)    Problem: Respiratory Insufficiency (Adult)  Goal: Acid/Base Balance  Outcome: Ongoing (interventions implemented as appropriate)  Goal: Effective Ventilation  Outcome: Ongoing (interventions implemented as appropriate)    Problem: Skin Integrity Impairment, Risk/Actual (Adult)  Goal: Skin Integrity/Wound Healing  Outcome: Ongoing (interventions implemented as appropriate)    Problem: Patient Care Overview (Adult)  Goal: Plan of Care Review    08/18/17 1721   Coping/Psychosocial Response Interventions   Plan Of Care Reviewed With patient   Patient Care Overview   Progress improving   Outcome Evaluation   Outcome Summary/Follow up Plan possible home tomorrow, PO lasix, wean O2, strict i & O

## 2017-08-18 NOTE — PROGRESS NOTES
"    [facility] Medicine Services  INPATIENT PROGRESS NOTE    Length of Stay: 3  Date of Admission: 8/15/2017  Primary Care Physician: Adonis    Subjective   Chief Complaint: shortness of breath   HPI   He has improved significantly over the last 24 hours. No complaints of shortness of breath or chest discomfort.  He wants to go home today, but I told him if he does well today that he can go tomorrow.  He tells me that he lives at home with his son.  He does not want home health.    He wants to make sure that he is home for the \"mirage.\"  I asked him if he meant the eclipse and he said yes.    Review of Systems   All pertinent negatives and positives are as above. All other systems have been reviewed and are negative unless otherwise stated.     Objective    Temp:  [96.5 °F (35.8 °C)-97.9 °F (36.6 °C)] 96.7 °F (35.9 °C)  Heart Rate:  [] 104  Resp:  [16-21] 16  BP: (112-153)/(56-92) 146/80  Physical Exam  Constitutional: Chronically ill-appearing.  On cannula.  Up in a chair, watching TV, and drinking a Coke. Discussed with his nurse, Brissa.   Head: Normocephalic and atraumatic. Edentulous.  Eyes: Conjunctivae and EOM are normal. Pupils are equal, round, and reactive to light.   Neck: Neck supple. No JVD present.   Cardiovascular: Normal rate, regular rhythm, normal heart sounds and intact distal pulses.  Exam reveals no gallop and no friction rub.  No murmur heard.  Pulmonary/Chest: Effort normal and breath sounds normal. No respiratory distress. He has no wheezes. He has no rales. He exhibits no tenderness. Diminished at the bases, but clear.   Abdominal: Soft. Bowel sounds are normal. He exhibits no distension. There is no tenderness. There is no rebound and no guarding.   Musculoskeletal: Normal range of motion. He exhibits no edema, tenderness or deformity.   Neurological: He is alert. He displays normal reflexes. No cranial nerve deficit. He exhibits normal muscle " tone.   Skin: Skin is warm and dry. No rash noted.   Psychiatric: Much brighter affect today and conversant.    Intake/Output Summary (Last 24 hours) at 08/18/17 1023  Last data filed at 08/18/17 0939   Gross per 24 hour   Intake              980 ml   Output             2000 ml   Net            -1020 ml     Results Review:  I have reviewed the labs, radiology results, and diagnostic studies.    Laboratory Data:     Results from last 7 days  Lab Units 08/18/17  0635 08/16/17  0240 08/15/17  0851   WBC 10*3/mm3 6.05 6.96 9.72   HEMOGLOBIN g/dL 9.5* 9.6* 9.8*   HEMATOCRIT % 30.1* 30.9* 31.5*   PLATELETS 10*3/mm3 136 114* 129*       Results from last 7 days  Lab Units 08/18/17  0636 08/17/17  1110 08/17/17  0139 08/16/17  0241 08/15/17  0851   SODIUM mmol/L 143 145  --  145 145   SODIUM, ARTERIAL mmol/L  --   --  138.6  --   --    POTASSIUM mmol/L 3.8 4.7  --  3.6 3.7   CHLORIDE mmol/L 101 105  --  101 102   CO2 mmol/L 31.0 33.0*  --  38.0* 34.0*   BUN mg/dL 17 13  --  16 17   CREATININE mg/dL 1.02 1.09  --  1.07 1.21   CALCIUM mg/dL 8.8 8.5  --  8.1* 8.2*   BILIRUBIN mg/dL  --   --   --   --  0.5   ALK PHOS U/L  --   --   --   --  48   ALT (SGPT) U/L  --   --   --   --  27   AST (SGOT) U/L  --   --   --   --  15   GLUCOSE mg/dL 112* 94  --  104* 95     I have reviewed the patient current medications.     Assessment/Plan   Assessment:   1.  Acute on chronic hypoxic and hypercarbic respiratory failure.  2.  Healthcare associated pneumonia.  3.  Atrial fibrillation, not on chronic anticoagulation.   4.  Acute on chronic systolic heart failure with ejection fraction 40-45%.  5.  Coronary artery disease, exact details unknown, nothing specific in [facility] notes.  6.  Systemic arterial hypertension.  7.  Chronic kidney disease, stage III.  8.  Remote tobacco abuse with likely underlying chronic obstructive pulmonary disease.  9.  History of polysubstance abuse.  10.  Bipolar disorder.  11.  Normocytic anemia.  12.   Thrombocytopenia, mild, chronic.      Plan:   Continue BiPAP as needed.  Wean oxygen as tolerated.      Continue broad-spectrum antibiotics.  Changed Levaquin to oral on 8/16.  De-escalated vancomycin on 8/17. I will de-escalate Zosyn tomorrow. Mucinex, inhaled bronchodilators (Xopenex and Atrovent).      Change Lasix to 40 mg oral twice per day.  Monitor electrolytes and renal function with doing so.  Strict intake and output.      Continue rate controlling medications and aspirin.      Lovenox for DVT prophylaxis.    PT/OT.     Discharge Planning: I expect the patient to be discharged home tomorrow.    Clint Torres, DO   08/18/17   10:21 AM

## 2017-08-19 VITALS
HEIGHT: 67 IN | HEART RATE: 113 BPM | BODY MASS INDEX: 34.81 KG/M2 | SYSTOLIC BLOOD PRESSURE: 138 MMHG | WEIGHT: 221.8 LBS | OXYGEN SATURATION: 95 % | RESPIRATION RATE: 20 BRPM | TEMPERATURE: 97 F | DIASTOLIC BLOOD PRESSURE: 96 MMHG

## 2017-08-19 PROCEDURE — 25010000002 ENOXAPARIN PER 10 MG: Performed by: INTERNAL MEDICINE

## 2017-08-19 PROCEDURE — 94660 CPAP INITIATION&MGMT: CPT

## 2017-08-19 PROCEDURE — 94799 UNLISTED PULMONARY SVC/PX: CPT

## 2017-08-19 PROCEDURE — 25010000002 PIPERACILLIN SOD-TAZOBACTAM PER 1 G: Performed by: FAMILY MEDICINE

## 2017-08-19 RX ORDER — DILTIAZEM HYDROCHLORIDE 240 MG/1
240 CAPSULE, COATED, EXTENDED RELEASE ORAL
Qty: 30 CAPSULE | Refills: 1 | Status: SHIPPED | OUTPATIENT
Start: 2017-08-20 | End: 2018-03-25 | Stop reason: HOSPADM

## 2017-08-19 RX ORDER — GUAIFENESIN 600 MG/1
1200 TABLET, EXTENDED RELEASE ORAL EVERY 12 HOURS SCHEDULED
Start: 2017-08-19

## 2017-08-19 RX ORDER — DILTIAZEM HYDROCHLORIDE 5 MG/ML
5 INJECTION INTRAVENOUS ONCE
Status: COMPLETED | OUTPATIENT
Start: 2017-08-19 | End: 2017-08-19

## 2017-08-19 RX ORDER — DILTIAZEM HYDROCHLORIDE 240 MG/1
240 CAPSULE, COATED, EXTENDED RELEASE ORAL
Status: DISCONTINUED | OUTPATIENT
Start: 2017-08-20 | End: 2017-08-19 | Stop reason: HOSPADM

## 2017-08-19 RX ORDER — LEVOFLOXACIN 750 MG/1
750 TABLET ORAL
Qty: 3 TABLET | Refills: 0 | Status: ON HOLD | OUTPATIENT
Start: 2017-08-19 | End: 2018-03-19

## 2017-08-19 RX ADMIN — LEVOFLOXACIN 750 MG: 750 TABLET, FILM COATED ORAL at 08:19

## 2017-08-19 RX ADMIN — ASPIRIN 81 MG: 81 TABLET ORAL at 08:19

## 2017-08-19 RX ADMIN — IPRATROPIUM BROMIDE 0.5 MG: 0.5 SOLUTION RESPIRATORY (INHALATION) at 08:00

## 2017-08-19 RX ADMIN — TAZOBACTAM SODIUM AND PIPERACILLIN SODIUM 4.5 G: 500; 4 INJECTION, SOLUTION INTRAVENOUS at 08:19

## 2017-08-19 RX ADMIN — ENOXAPARIN SODIUM 40 MG: 40 INJECTION SUBCUTANEOUS at 08:20

## 2017-08-19 RX ADMIN — DILTIAZEM HYDROCHLORIDE 180 MG: 180 CAPSULE, EXTENDED RELEASE ORAL at 08:19

## 2017-08-19 RX ADMIN — METOPROLOL SUCCINATE 100 MG: 100 TABLET, FILM COATED, EXTENDED RELEASE ORAL at 08:19

## 2017-08-19 RX ADMIN — DILTIAZEM HYDROCHLORIDE 5 MG: 5 INJECTION INTRAVENOUS at 11:01

## 2017-08-19 RX ADMIN — LEVALBUTEROL HYDROCHLORIDE 1.25 MG: 1.25 SOLUTION, CONCENTRATE RESPIRATORY (INHALATION) at 12:00

## 2017-08-19 RX ADMIN — FUROSEMIDE 40 MG: 40 TABLET ORAL at 08:19

## 2017-08-19 RX ADMIN — GUAIFENESIN 1200 MG: 600 TABLET, EXTENDED RELEASE ORAL at 08:19

## 2017-08-19 RX ADMIN — IPRATROPIUM BROMIDE 0.5 MG: 0.5 SOLUTION RESPIRATORY (INHALATION) at 12:00

## 2017-08-19 RX ADMIN — LEVALBUTEROL HYDROCHLORIDE 1.25 MG: 1.25 SOLUTION, CONCENTRATE RESPIRATORY (INHALATION) at 08:00

## 2017-08-19 RX ADMIN — TAZOBACTAM SODIUM AND PIPERACILLIN SODIUM 4.5 G: 500; 4 INJECTION, SOLUTION INTRAVENOUS at 02:15

## 2017-08-19 NOTE — DISCHARGE PLACEMENT REQUEST
"To:  [facility]  From:  COLBY Carrizales  [phone]    Braxton Hoyos (66 y.o. Male)     Date of Birth Social Security Number Address Home Phone MRN    1950  [address] [place] [address] [phone] [id number]    Mandaeism Marital Status          None        Admission Date Admission Type Admitting Provider Attending Provider Department, Room/Bed    8/15/17 Urgent Clint Torres DO Hancock, John C, DO [facility] 4C, 492/1    Discharge Date Discharge Disposition Discharge Destination         Home or Self Care             Attending Provider: Clint Torres DO     Allergies:  No Known Allergies    Isolation:  None   Infection:  None   Code Status:  FULL    Ht:  67\" (170.2 cm)   Wt:  221 lb 12.8 oz (101 kg)    Admission Cmt:  None   Principal Problem:  None                Active Insurance as of 8/15/2017     Primary Coverage     Payor Plan Insurance Group Employer/Plan Group    [employer] [id number]      Payor Plan Address Payor Plan Phone Number Effective From Effective To    [employer] 04 [phone] 8/15/2017     [address] [place] [id number]       Subscriber Name Subscriber Birth Date Member ID       BRAXTON HOYOS 1950 [id number]                 Emergency Contacts      (Rel.) Home Phone Work Phone Mobile Phone    Roma Liu/Braxton (Son) [phone] -- --    Alma Rosa Hoyos (Daughter) -- -- [phone]                 Discharge Summary      Clint Torres DO at 8/19/2017  9:22 AM              [facility]  DISCHARGE SUMMARY       Date of Admission: 8/15/2017  Date of Discharge:  8/19/2017  [facility]     Presenting Problem/History of Present Illness:  Shortness of breath.      Final Discharge Diagnoses:  1.  Acute on chronic hypoxic and hypercarbic respiratory failure.  2.  Healthcare associated pneumonia presenting with sepsis.  3.  Atrial fibrillation, on " chronic anticoagulation with Pradaxa.   4.  Acute on chronic systolic heart failure with ejection fraction 40-45%.  5.  Coronary artery disease, exact details unknown, nothing specific in [facility] notes.  6.  Systemic arterial hypertension.  7.  Chronic kidney disease, stage III.  8.  Remote tobacco abuse with likely underlying chronic obstructive pulmonary disease.  9.  History of polysubstance abuse.  10.  Metabolic encephalopathy, resolved.  11.  Bipolar disorder.  12.  Normocytic anemia.  13.  Thrombocytopenia, mild, chronic, resolved at present.    Consults: Dr. Recio with pulmonary.     Procedures Performed: None.     Pertinent Test Results:   Imaging Results (last 7 days)     Procedure Component Value Units Date/Time    XR Chest 1 View [[id number]] Collected:  08/15/17 0820     Updated:  08/15/17 0824    Narrative:       Exam:   XR CHEST 1 VW-       Date:  08/15/2017      History:  Male, age  66 years;hypoxia     COMPARISON:  None.     Findings :     The heart and mediastinum are borderline in size which may be partially  due to low lung volumes as well as technique. Low lung volumes.. Lungs  are without focal infiltrate, mass or effusions.  The bones show no  acute pathology.         Impression:       Impression:     Mild prominence of cardiac mediastinal silhouette, with mild prominence  of the central pulmonary vasculature, concerning for mild/early fluid  overload.     This report was finalized on 08/15/2017 08:21 by Dr. Lu Howell MD.    XR Chest 1 View [[id number]] Collected:  08/17/17 0740     Updated:  08/17/17 0745    Narrative:       EXAMINATION: XR CHEST 1 VW- 8/17/2017 7:40 AM CDT     HISTORY: Respiratory failure.     REPORT: Comparison is made with the study from 08/15/2017.     Lungs are moderately hypoaerated, there is central and basilar vascular  crowding, with normal heart size. No pulmonary consolidation is  identified. There is no pneumothorax or definite effusion. The  osseous  structures are unremarkable.       Impression:       Moderate pulmonary hypoventilation with vascular crowding,  no active CHF or definite pneumonia.  This report was finalized on 08/17/2017 07:42 by Dr. Daniel Jimenez MD.        Lab Results (last 7 days)     Procedure Component Value Units Date/Time    Urinalysis With / Microscopic If Indicated [[id number]]  (Abnormal) Collected:  08/15/17 0023    Specimen:  Urine from Urine, Clean Catch Updated:  08/15/17 0032     Color, UA Yellow     Appearance, UA Clear     pH, UA <=5.0     Specific Gravity, UA >1.030 (H)     Glucose, UA Negative     Ketones, UA Negative     Bilirubin, UA Negative     Blood, UA Negative     Protein, UA Negative     Leuk Esterase, UA Negative     Nitrite, UA Negative     Urobilinogen, UA 1.0 E.U./dL    Narrative:       Urine microscopic not indicated.    Blood Gas, Arterial [[id number]]  (Abnormal) Collected:  08/15/17 0226    Specimen:  Arterial Blood Updated:  08/15/17 0230     Site Arterial: right radial     Cristobal's Test --      Documented in Rapid Comm        pH, Arterial 7.286 (L) pH units      pCO2, Arterial 77.3 (C) mm Hg      pO2, Arterial 96.2 mm Hg      HCO3, Arterial 36.0 (H) mmol/L      Base Excess, Arterial 6.2 (H) mmol/L      O2 Saturation, Arterial 96.2 %      O2 Saturation Calculated 96.2 %      Barometric Pressure for Blood Gas -- mmHg       Component not reported at this site.        Modality BiPAP     FIO2 40 %      Rate 12.0 Breaths/minute      IPAP 18     EPAP 8     Notified [facility] [id number]     Notified By [id number]     Notified Time 8/15/2017 2:29:45 AM    Narrative:       Serial Number: [id number]    : [id number]    Blood Gas, Arterial [[id number]]  (Abnormal) Collected:  08/15/17 0331    Specimen:  Arterial Blood Updated:  08/15/17 0333     Site Arterial: right radial     Cristobal's Test --      Documented in Rapid Comm        pH, Arterial 7.372 pH units      pCO2, Arterial 60.2 (H) mm Hg      pO2, Arterial 57.7 (L) mm Hg       HCO3, Arterial 34.2 (H) mmol/L      Base Excess, Arterial 6.8 (H) mmol/L      O2 Saturation, Arterial 88.6 (L) %      O2 Saturation Calculated 88.6 (L) %      Barometric Pressure for Blood Gas -- mmHg       Component not reported at this site.        Modality BiPAP     FIO2 21 %      Rate 12.0 Breaths/minute      IPAP 20     EPAP 8    Narrative:       Serial Number: [id number]    : [id number]    Lactic Acid, Plasma [[id number]]  (Normal) Collected:  08/15/17 0523    Specimen:  Blood Updated:  08/15/17 0546     Lactate 1.5 mmol/L     CBC Auto Differential [[id number]]  (Abnormal) Collected:  08/15/17 0851    Specimen:  Blood Updated:  08/15/17 0907     WBC 9.72 10*3/mm3      RBC 3.43 (L) 10*6/mm3      Hemoglobin 9.8 (L) g/dL      Hematocrit 31.5 (L) %      MCV 91.8 fL      MCH 28.6 pg      MCHC 31.1 (L) g/dL      RDW 14.7 %      RDW-SD 49.2 fl      MPV 11.1 fL      Platelets 129 (L) 10*3/mm3      Neutrophil % 81.6 (H) %      Lymphocyte % 9.5 (L) %      Monocyte % 5.5 %      Eosinophil % 2.9 %      Basophil % 0.2 %      Immature Grans % 0.3 %      Neutrophils, Absolute 7.94 10*3/mm3      Lymphocytes, Absolute 0.92 10*3/mm3      Monocytes, Absolute 0.53 10*3/mm3      Eosinophils, Absolute 0.28 10*3/mm3      Basophils, Absolute 0.02 10*3/mm3      Immature Grans, Absolute 0.03 10*3/mm3     Magnesium [[id number]]  (Normal) Collected:  08/15/17 0851    Specimen:  Blood Updated:  08/15/17 0913     Magnesium 1.8 mg/dL     Phosphorus [[id number]]  (Normal) Collected:  08/15/17 0851    Specimen:  Blood Updated:  08/15/17 0913     Phosphorus 4.5 mg/dL     Comprehensive Metabolic Panel [[id number]]  (Abnormal) Collected:  08/15/17 0851    Specimen:  Blood Updated:  08/15/17 0913     Glucose 95 mg/dL      BUN 17 mg/dL      Creatinine 1.21 mg/dL      Sodium 145 mmol/L      Potassium 3.7 mmol/L      Chloride 102 mmol/L      CO2 34.0 (H) mmol/L      Calcium 8.2 (L) mg/dL      Total Protein 6.5 g/dL      Albumin 3.30 (L) g/dL      ALT  (SGPT) 27 U/L      AST (SGOT) 15 U/L      Alkaline Phosphatase 48 U/L      Total Bilirubin 0.5 mg/dL      eGFR Non African Amer 60 (L) mL/min/1.73      Globulin 3.2 gm/dL      A/G Ratio 1.0 (L) g/dL      BUN/Creatinine Ratio 14.0     Anion Gap 9.0 mmol/L     Troponin [[id number]]  (Normal) Collected:  08/15/17 0851    Specimen:  Blood Updated:  08/15/17 0924     Troponin I 0.021 ng/mL     CBC (No Diff) [[id number]]  (Abnormal) Collected:  08/16/17 0240    Specimen:  Blood Updated:  08/16/17 0333     WBC 6.96 10*3/mm3      RBC 3.37 (L) 10*6/mm3      Hemoglobin 9.6 (L) g/dL      Hematocrit 30.9 (L) %      MCV 91.7 fL      MCH 28.5 pg      MCHC 31.1 (L) g/dL      RDW 14.6 %      RDW-SD 48.8 fl      MPV 11.0 fL      Platelets 114 (L) 10*3/mm3     Blood Gas, Arterial [[id number]]  (Abnormal) Collected:  08/16/17 0337    Specimen:  Arterial Blood Updated:  08/16/17 0341     Site Arterial: right radial     Cristobal's Test --      Documented in Rapid Comm        pH, Arterial 7.406 pH units      pCO2, Arterial 59.5 (H) mm Hg      pO2, Arterial 58.0 (L) mm Hg      HCO3, Arterial 36.5 (H) mmol/L      Base Excess, Arterial 9.4 (H) mmol/L      O2 Saturation, Arterial 89.6 (L) %      O2 Saturation Calculated 89.6 (L) %      Barometric Pressure for Blood Gas -- mmHg       Component not reported at this site.        Modality BiPAP     FIO2 22 %      IPAP 20     EPAP 8    Narrative:       Serial Number: [id number]    : [id number]    Basic Metabolic Panel [[id number]]  (Abnormal) Collected:  08/16/17 0241    Specimen:  Blood Updated:  08/16/17 0346     Glucose 104 (H) mg/dL      BUN 16 mg/dL      Creatinine 1.07 mg/dL      Sodium 145 mmol/L      Potassium 3.6 mmol/L      Chloride 101 mmol/L      CO2 38.0 (H) mmol/L      Calcium 8.1 (L) mg/dL      eGFR Non African Amer 69 mL/min/1.73      BUN/Creatinine Ratio 15.0     Anion Gap 6.0 mmol/L     Narrative:       GFR Normal >60  Chronic Kidney Disease <60  Kidney Failure <15    Blood Gas,  Arterial With Co-Ox [[id number]]  (Abnormal) Collected:  08/17/17 0139    Specimen:  Arterial Blood Updated:  08/17/17 0143     Site Arterial: right radial     Cristobal's Test --      Documented in Rapid Comm        pH, Arterial 7.396 pH units      pCO2, Arterial 64.7 (H) mm Hg      pO2, Arterial 54.2 (L) mm Hg      HCO3, Arterial 38.8 (H) mmol/L      Base Excess, Arterial 11.8 (H) mmol/L      Hemoglobin, Blood Gas 10.8 (L) g/dL      Hematocrit, Blood Gas 32.0 (L) %      Oxyhemoglobin 87.0 (L) %      Methemoglobin 0.30 (L) %      Carboxyhemoglobin 0.7 %      Sodium, Arterial 138.6 mmol/L      Potassium, Arterial 3.71 mmol/L      Barometric Pressure for Blood Gas -- mmHg       Component not reported at this site.        Modality Cannula     Flow Rate 2.00 lpm     Narrative:       Serial Number: [id number]    : [id number]    Urine Culture [[id number]]  (Normal) Collected:  08/15/17 0023    Specimen:  Urine from Urine, Clean Catch Updated:  08/17/17 0634     Urine Culture No growth at 2 days    Urine Culture [[id number]]  (Normal) Collected:  08/15/17 0843    Specimen:  Urine from Urine, Clean Catch Updated:  08/17/17 0634     Urine Culture No growth at 2 days    Basic Metabolic Panel [[id number]]  (Abnormal) Collected:  08/17/17 1110    Specimen:  Blood Updated:  08/17/17 1215     Glucose 94 mg/dL      BUN 13 mg/dL      Creatinine 1.09 mg/dL      Sodium 145 mmol/L      Potassium 4.7 mmol/L      Chloride 105 mmol/L      CO2 33.0 (H) mmol/L      Calcium 8.5 mg/dL      eGFR Non African Amer 67 mL/min/1.73      BUN/Creatinine Ratio 11.9     Anion Gap 7.0 mmol/L     Narrative:       GFR Normal >60  Chronic Kidney Disease <60  Kidney Failure <15    CBC (No Diff) [[id number]]  (Abnormal) Collected:  08/18/17 0635    Specimen:  Blood Updated:  08/18/17 0714     WBC 6.05 10*3/mm3      RBC 3.41 (L) 10*6/mm3      Hemoglobin 9.5 (L) g/dL      Hematocrit 30.1 (L) %      MCV 88.3 fL      MCH 27.9 (L) pg      MCHC 31.6 (L) g/dL      RDW 14.8  %      RDW-SD 47.4 fl      MPV 11.1 fL      Platelets 136 10*3/mm3     Basic Metabolic Panel [[id number]]  (Abnormal) Collected:  08/18/17 0636    Specimen:  Blood Updated:  08/18/17 0727     Glucose 112 (H) mg/dL      BUN 17 mg/dL      Creatinine 1.02 mg/dL      Sodium 143 mmol/L      Potassium 3.8 mmol/L      Chloride 101 mmol/L      CO2 31.0 mmol/L      Calcium 8.8 mg/dL      eGFR Non African Amer 73 mL/min/1.73      BUN/Creatinine Ratio 16.7     Anion Gap 11.0 mmol/L     Narrative:       GFR Normal >60  Chronic Kidney Disease <60  Kidney Failure <15    Blood Culture [[id number]]  (Normal) Collected:  08/15/17 0851    Specimen:  Blood from Arm, Left Updated:  08/18/17 1001     Blood Culture No growth at 3 days    Blood Culture [[id number]]  (Normal) Collected:  08/15/17 0834    Specimen:  Blood from Hand, Left Updated:  08/18/17 1001     Blood Culture No growth at 3 days        Hospital Course:  The patient is a 66 y.o. male who presented to [facility] as a transfer from [facility] after presenting there with shortness of breath, worsening hypoxemia above his baseline, lactic acidosis, and possible pneumonia.  The patient has not been at our facility since September 2016.  He has more frequent admissions at [facility] as his cardiologist, Dr. Santiago, attends there.  He has a history of coronary artery disease that is poorly explained in their office and hospital notes.  The patient is a poor historian with regards to this as well.  He also has a history of systolic heart failure and atrial fibrillation.  When he first presented here, he did not seem to be on chronic anticoagulation, however, the [facility] and [facility] indeed takes [facility]ad[facility]a.  There were many discrepancies on his initial home medication list, all of which have been corrected.  Another thing that made the patient's care difficult at times on this hospitalization is  "that he proves to be a poor historian even after clearing his metabolic encephalopathy.  I reviewed the records from [facility] to delineate further history as well.    He was transferred here on BiPAP.  He benefited from this therapy initially and has now been weaned off.  There is some question as to whether or not he is supposed to be on one of these chronically at night, however, the patient tells me that he will not comply with wearing this even if one is set up.     He was treated with broad-spectrum antibiotics and did be escalated to Levaquin monotherapy.  He will complete another 3 days of oral Levaquin as an outpatient.  He was also treated with Mucinex and inhaled bronchodilators.  I have written him a prescription for Combivent for home use as it appears that he has not been on inhaled bronchodilators in the past.  He does have home oxygen and wears 2 L continuously.  He is currently on 2 L and has kept saturations in the mid 90s.    He was also diuresed with Lasix and is now back on his home dosage.  He appears euvolemic.    He wants to go home today.  [facility] He tells me that he goes to the one in [place], but prior records also show that he has been seen in the [facility].  He will need to follow-up with [facility] cardiology in 2 weeks.  He has seen Dr. Santiago there before.  Their records also indicate that he has seen Dr. Weiss in [place] previously.    Condition on Discharge: Good.     Physical Exam on Discharge:  /96  Pulse 108  Temp 97 °F (36.1 °C)  Resp 20  Ht 67\" (170.2 cm)  Wt 221 lb 12.8 oz (101 kg)  SpO2 95%  BMI 34.74 kg/m2  Physical Exam  Constitutional: On cannula.  Up and ambulatory in the room. Discussed with his nurse, Juanita.   Head: Normocephalic and atraumatic. Edentulous.  Eyes: Conjunctivae and EOM are normal. Pupils are equal, round, and reactive to light.   Neck: Neck supple. No JVD present. "   Cardiovascular: Normal rate, regular rhythm, normal heart sounds and intact distal pulses.  Exam reveals no gallop and no friction rub.  No murmur heard.  Pulmonary/Chest: Effort normal and breath sounds normal. No respiratory distress. He has no wheezes. He has no rales. He exhibits no tenderness. Diminished at the bases, but clear.   Abdominal: Soft. Bowel sounds are normal. He exhibits no distension. There is no tenderness. There is no rebound and no guarding.   Musculoskeletal: Normal range of motion. He exhibits no edema, tenderness or deformity.   Neurological: He is alert. He displays normal reflexes. No cranial nerve deficit. He exhibits normal muscle tone.   Skin: Skin is warm and dry. No rash noted.   Psychiatric: Much brighter affect again today and more conversant.    Discharge Disposition:  Home with his family. He will continue with home health.     Discharge Medications:   Froy Hoyos   Home Medication Instructions KENDRA:[id number]    Printed on:08/19/17 6239   Medication Information                      allopurinol (ZYLOPRIM) 100 MG tablet  Take 100 mg by mouth Daily.             aspirin 81 MG EC tablet  Take 81 mg by mouth Daily.             atorvastatin (LIPITOR) 20 MG tablet  Take 20 mg by mouth Daily.             dabigatran etexilate (PRADAXA) 150 MG capsu  Take 150 mg by mouth 2 (Two) Times a Day.             diltiaZEM CD (CARDIZEM CD) 240 MG 24 hr capsule  Take 1 capsule by mouth Daily.             divalproex (DEPAKOTE) 500 MG DR tablet  Take 1,000 mg by mouth Daily.             furosemide (LASIX) 40 MG tablet  Take 40 mg by mouth 2 (Two) Times a Day.             guaiFENesin (MUCINEX) 600 MG 12 hr tablet  Take 2 tablets by mouth Every 12 (Twelve) Hours.             ipratropium-albuterol (COMBIVENT RESPIMAT)  MCG/ACT inhaler  Inhale 1 puff 4 (Four) Times a Day As Needed for Wheezing.             levoFLOXacin (LEVAQUIN) 750 MG tablet  Take 1 tablet by mouth Daily. Indications:  Pneumonia             lisinopril (PRINIVIL,ZESTRIL) 40 MG tablet  Take 40 mg by mouth daily.             metoprolol succinate XL (TOPROL-XL) 100 MG 24 hr tablet  Take 100 mg by mouth 2 (Two) Times a Day.             QUEtiapine (SEROquel) 400 MG tablet  Take 400 mg by mouth Every Night.               Discharge Diet:   Diet Instructions     Diet: Cardiac; Thin       Discharge Diet:  Cardiac   Fluid Consistency:  Thin               Activity at Discharge:   Activity Instructions     Activity as Tolerated                   Follow-up Appointments:   [facility]   2. Cardiology at [facility] in 2 weeks.     Test Results Pending at Discharge: None.     Clint Torres DO  08/19/17  9:46 AM    Time: 25 minutes.            Electronically signed by Clint Torres DO at 8/19/2017  9:46 AM        Discharge Order     Start     Ordered    08/19/17 0922  Discharge patient  Once     Expected Discharge Date:  08/19/17    Discharge Disposition:  Home or Self Care        08/19/17 0922

## 2017-08-19 NOTE — DISCHARGE SUMMARY
[facility] Medicine Services  DISCHARGE SUMMARY       Date of Admission: 8/15/2017  Date of Discharge:  8/19/2017  [facility]     Presenting Problem/History of Present Illness:  Shortness of breath.      Final Discharge Diagnoses:  1.  Acute on chronic hypoxic and hypercarbic respiratory failure.  2.  Healthcare associated pneumonia presenting with sepsis.  3.  Atrial fibrillation, on chronic anticoagulation with Pradaxa.   4.  Acute on chronic systolic heart failure with ejection fraction 40-45%.  5.  Coronary artery disease, exact details unknown, nothing specific in [facility] notes.  6.  Systemic arterial hypertension.  7.  Chronic kidney disease, stage III.  8.  Remote tobacco abuse with likely underlying chronic obstructive pulmonary disease.  9.  History of polysubstance abuse.  10.  Metabolic encephalopathy, resolved.  11.  Bipolar disorder.  12.  Normocytic anemia.  13.  Thrombocytopenia, mild, chronic, resolved at present.    Consults: Dr. Recio with pulmonary.     Procedures Performed: None.     Pertinent Test Results:   Imaging Results (last 7 days)     Procedure Component Value Units Date/Time    XR Chest 1 View [[id number]] Collected:  08/15/17 0820     Updated:  08/15/17 0824    Narrative:       Exam:   XR CHEST 1 VW-       Date:  08/15/2017      History:  Male, age  66 years;hypoxia     COMPARISON:  None.     Findings :     The heart and mediastinum are borderline in size which may be partially  due to low lung volumes as well as technique. Low lung volumes.. Lungs  are without focal infiltrate, mass or effusions.  The bones show no  acute pathology.         Impression:       Impression:     Mild prominence of cardiac mediastinal silhouette, with mild prominence  of the central pulmonary vasculature, concerning for mild/early fluid  overload.     This report was finalized on 08/15/2017 08:21 by Dr. Lu Howell MD.    XR Chest 1 View  [[id number]] Collected:  08/17/17 0740     Updated:  08/17/17 0745    Narrative:       EXAMINATION: XR CHEST 1 VW- 8/17/2017 7:40 AM CDT     HISTORY: Respiratory failure.     REPORT: Comparison is made with the study from 08/15/2017.     Lungs are moderately hypoaerated, there is central and basilar vascular  crowding, with normal heart size. No pulmonary consolidation is  identified. There is no pneumothorax or definite effusion. The osseous  structures are unremarkable.       Impression:       Moderate pulmonary hypoventilation with vascular crowding,  no active CHF or definite pneumonia.  This report was finalized on 08/17/2017 07:42 by Dr. Daniel Jimenez MD.        Lab Results (last 7 days)     Procedure Component Value Units Date/Time    Urinalysis With / Microscopic If Indicated [[id number]]  (Abnormal) Collected:  08/15/17 0023    Specimen:  Urine from Urine, Clean Catch Updated:  08/15/17 0032     Color, UA Yellow     Appearance, UA Clear     pH, UA <=5.0     Specific Gravity, UA >1.030 (H)     Glucose, UA Negative     Ketones, UA Negative     Bilirubin, UA Negative     Blood, UA Negative     Protein, UA Negative     Leuk Esterase, UA Negative     Nitrite, UA Negative     Urobilinogen, UA 1.0 E.U./dL    Narrative:       Urine microscopic not indicated.    Blood Gas, Arterial [[id number]]  (Abnormal) Collected:  08/15/17 0226    Specimen:  Arterial Blood Updated:  08/15/17 0230     Site Arterial: right radial     Cristobal's Test --      Documented in Rapid Comm        pH, Arterial 7.286 (L) pH units      pCO2, Arterial 77.3 (C) mm Hg      pO2, Arterial 96.2 mm Hg      HCO3, Arterial 36.0 (H) mmol/L      Base Excess, Arterial 6.2 (H) mmol/L      O2 Saturation, Arterial 96.2 %      O2 Saturation Calculated 96.2 %      Barometric Pressure for Blood Gas -- mmHg       Component not reported at this site.        Modality BiPAP     FIO2 40 %      Rate 12.0 Breaths/minute      IPAP 18     EPAP 8     Notified Who [id number]      Notified By [id number]     Notified Time 8/15/2017 2:29:45 AM    Narrative:       Serial Number: [id number]    : [id number]    Blood Gas, Arterial [[id number]]  (Abnormal) Collected:  08/15/17 0331    Specimen:  Arterial Blood Updated:  08/15/17 0333     Site Arterial: right radial     Cristobal's Test --      Documented in Rapid Comm        pH, Arterial 7.372 pH units      pCO2, Arterial 60.2 (H) mm Hg      pO2, Arterial 57.7 (L) mm Hg      HCO3, Arterial 34.2 (H) mmol/L      Base Excess, Arterial 6.8 (H) mmol/L      O2 Saturation, Arterial 88.6 (L) %      O2 Saturation Calculated 88.6 (L) %      Barometric Pressure for Blood Gas -- mmHg       Component not reported at this site.        Modality BiPAP     FIO2 21 %      Rate 12.0 Breaths/minute      IPAP 20     EPAP 8    Narrative:       Serial Number: [id number]    : [id number]    Lactic Acid, Plasma [[id number]]  (Normal) Collected:  08/15/17 0523    Specimen:  Blood Updated:  08/15/17 0546     Lactate 1.5 mmol/L     CBC Auto Differential [[id number]]  (Abnormal) Collected:  08/15/17 0851    Specimen:  Blood Updated:  08/15/17 0907     WBC 9.72 10*3/mm3      RBC 3.43 (L) 10*6/mm3      Hemoglobin 9.8 (L) g/dL      Hematocrit 31.5 (L) %      MCV 91.8 fL      MCH 28.6 pg      MCHC 31.1 (L) g/dL      RDW 14.7 %      RDW-SD 49.2 fl      MPV 11.1 fL      Platelets 129 (L) 10*3/mm3      Neutrophil % 81.6 (H) %      Lymphocyte % 9.5 (L) %      Monocyte % 5.5 %      Eosinophil % 2.9 %      Basophil % 0.2 %      Immature Grans % 0.3 %      Neutrophils, Absolute 7.94 10*3/mm3      Lymphocytes, Absolute 0.92 10*3/mm3      Monocytes, Absolute 0.53 10*3/mm3      Eosinophils, Absolute 0.28 10*3/mm3      Basophils, Absolute 0.02 10*3/mm3      Immature Grans, Absolute 0.03 10*3/mm3     Magnesium [[id number]]  (Normal) Collected:  08/15/17 0851    Specimen:  Blood Updated:  08/15/17 0913     Magnesium 1.8 mg/dL     Phosphorus [[id number]]  (Normal) Collected:  08/15/17 0851    Specimen:  Blood  Updated:  08/15/17 0913     Phosphorus 4.5 mg/dL     Comprehensive Metabolic Panel [[id number]]  (Abnormal) Collected:  08/15/17 0851    Specimen:  Blood Updated:  08/15/17 0913     Glucose 95 mg/dL      BUN 17 mg/dL      Creatinine 1.21 mg/dL      Sodium 145 mmol/L      Potassium 3.7 mmol/L      Chloride 102 mmol/L      CO2 34.0 (H) mmol/L      Calcium 8.2 (L) mg/dL      Total Protein 6.5 g/dL      Albumin 3.30 (L) g/dL      ALT (SGPT) 27 U/L      AST (SGOT) 15 U/L      Alkaline Phosphatase 48 U/L      Total Bilirubin 0.5 mg/dL      eGFR Non African Amer 60 (L) mL/min/1.73      Globulin 3.2 gm/dL      A/G Ratio 1.0 (L) g/dL      BUN/Creatinine Ratio 14.0     Anion Gap 9.0 mmol/L     Troponin [[id number]]  (Normal) Collected:  08/15/17 0851    Specimen:  Blood Updated:  08/15/17 0924     Troponin I 0.021 ng/mL     CBC (No Diff) [[id number]]  (Abnormal) Collected:  08/16/17 0240    Specimen:  Blood Updated:  08/16/17 0333     WBC 6.96 10*3/mm3      RBC 3.37 (L) 10*6/mm3      Hemoglobin 9.6 (L) g/dL      Hematocrit 30.9 (L) %      MCV 91.7 fL      MCH 28.5 pg      MCHC 31.1 (L) g/dL      RDW 14.6 %      RDW-SD 48.8 fl      MPV 11.0 fL      Platelets 114 (L) 10*3/mm3     Blood Gas, Arterial [[id number]]  (Abnormal) Collected:  08/16/17 0337    Specimen:  Arterial Blood Updated:  08/16/17 0341     Site Arterial: right radial     Cristobal's Test --      Documented in Rapid Comm        pH, Arterial 7.406 pH units      pCO2, Arterial 59.5 (H) mm Hg      pO2, Arterial 58.0 (L) mm Hg      HCO3, Arterial 36.5 (H) mmol/L      Base Excess, Arterial 9.4 (H) mmol/L      O2 Saturation, Arterial 89.6 (L) %      O2 Saturation Calculated 89.6 (L) %      Barometric Pressure for Blood Gas -- mmHg       Component not reported at this site.        Modality BiPAP     FIO2 22 %      IPAP 20     EPAP 8    Narrative:       Serial Number: [id number]    : [id number]    Basic Metabolic Panel [[id number]]  (Abnormal) Collected:  08/16/17 0241     Specimen:  Blood Updated:  08/16/17 0346     Glucose 104 (H) mg/dL      BUN 16 mg/dL      Creatinine 1.07 mg/dL      Sodium 145 mmol/L      Potassium 3.6 mmol/L      Chloride 101 mmol/L      CO2 38.0 (H) mmol/L      Calcium 8.1 (L) mg/dL      eGFR Non African Amer 69 mL/min/1.73      BUN/Creatinine Ratio 15.0     Anion Gap 6.0 mmol/L     Narrative:       GFR Normal >60  Chronic Kidney Disease <60  Kidney Failure <15    Blood Gas, Arterial With Co-Ox [[id number]]  (Abnormal) Collected:  08/17/17 0139    Specimen:  Arterial Blood Updated:  08/17/17 0143     Site Arterial: right radial     Cristobal's Test --      Documented in Rapid Comm        pH, Arterial 7.396 pH units      pCO2, Arterial 64.7 (H) mm Hg      pO2, Arterial 54.2 (L) mm Hg      HCO3, Arterial 38.8 (H) mmol/L      Base Excess, Arterial 11.8 (H) mmol/L      Hemoglobin, Blood Gas 10.8 (L) g/dL      Hematocrit, Blood Gas 32.0 (L) %      Oxyhemoglobin 87.0 (L) %      Methemoglobin 0.30 (L) %      Carboxyhemoglobin 0.7 %      Sodium, Arterial 138.6 mmol/L      Potassium, Arterial 3.71 mmol/L      Barometric Pressure for Blood Gas -- mmHg       Component not reported at this site.        Modality Cannula     Flow Rate 2.00 lpm     Narrative:       Serial Number: [id number]    : [id number]    Urine Culture [[id number]]  (Normal) Collected:  08/15/17 0023    Specimen:  Urine from Urine, Clean Catch Updated:  08/17/17 0634     Urine Culture No growth at 2 days    Urine Culture [[id number]]  (Normal) Collected:  08/15/17 0843    Specimen:  Urine from Urine, Clean Catch Updated:  08/17/17 0634     Urine Culture No growth at 2 days    Basic Metabolic Panel [[id number]]  (Abnormal) Collected:  08/17/17 1110    Specimen:  Blood Updated:  08/17/17 1215     Glucose 94 mg/dL      BUN 13 mg/dL      Creatinine 1.09 mg/dL      Sodium 145 mmol/L      Potassium 4.7 mmol/L      Chloride 105 mmol/L      CO2 33.0 (H) mmol/L      Calcium 8.5 mg/dL      eGFR Non African Amer 67  mL/min/1.73      BUN/Creatinine Ratio 11.9     Anion Gap 7.0 mmol/L     Narrative:       GFR Normal >60  Chronic Kidney Disease <60  Kidney Failure <15    CBC (No Diff) [[id number]]  (Abnormal) Collected:  08/18/17 0635    Specimen:  Blood Updated:  08/18/17 0714     WBC 6.05 10*3/mm3      RBC 3.41 (L) 10*6/mm3      Hemoglobin 9.5 (L) g/dL      Hematocrit 30.1 (L) %      MCV 88.3 fL      MCH 27.9 (L) pg      MCHC 31.6 (L) g/dL      RDW 14.8 %      RDW-SD 47.4 fl      MPV 11.1 fL      Platelets 136 10*3/mm3     Basic Metabolic Panel [[id number]]  (Abnormal) Collected:  08/18/17 0636    Specimen:  Blood Updated:  08/18/17 0727     Glucose 112 (H) mg/dL      BUN 17 mg/dL      Creatinine 1.02 mg/dL      Sodium 143 mmol/L      Potassium 3.8 mmol/L      Chloride 101 mmol/L      CO2 31.0 mmol/L      Calcium 8.8 mg/dL      eGFR Non African Amer 73 mL/min/1.73      BUN/Creatinine Ratio 16.7     Anion Gap 11.0 mmol/L     Narrative:       GFR Normal >60  Chronic Kidney Disease <60  Kidney Failure <15    Blood Culture [[id number]]  (Normal) Collected:  08/15/17 0851    Specimen:  Blood from Arm, Left Updated:  08/18/17 1001     Blood Culture No growth at 3 days    Blood Culture [[id number]]  (Normal) Collected:  08/15/17 0834    Specimen:  Blood from Hand, Left Updated:  08/18/17 1001     Blood Culture No growth at 3 days        Hospital Course:  The patient is a 66 y.o. male who presented to [facility] as a transfer from [facility] after presenting there with shortness of breath, worsening hypoxemia above his baseline, lactic acidosis, and possible pneumonia.  The patient has not been at our facility since September 2016.  He has more frequent admissions at [facility] as his cardiologist, Dr. Santiago, attends there.  He has a history of coronary artery disease that is poorly explained in their office and hospital notes.  The patient is a poor historian with regards to this as well.  He also  has a history of systolic heart failure and atrial fibrillation.  When he first presented here, he did not seem to be on chronic anticoagulation, however, the [facility] and [facility] indeed takes [facility]a.  There were many discrepancies on his initial home medication list, all of which have been corrected.  Another thing that made the patient's care difficult at times on this hospitalization is that he proves to be a poor historian even after clearing his metabolic encephalopathy.  I reviewed the records from [facility] to delineate further history as well.    He was transferred here on BiPAP.  He benefited from this therapy initially and has now been weaned off.  There is some question as to whether or not he is supposed to be on one of these chronically at night, however, the patient tells me that he will not comply with wearing this even if one is set up.     He was treated with broad-spectrum antibiotics and did be escalated to Levaquin monotherapy.  He will complete another 3 days of oral Levaquin as an outpatient.  He was also treated with Mucinex and inhaled bronchodilators.  I have written him a prescription for Combivent for home use as it appears that he has not been on inhaled bronchodilators in the past.  He does have home oxygen and wears 2 L continuously.  He is currently on 2 L and has kept saturations in the mid 90s.    He was also diuresed with Lasix and is now back on his home dosage.  He appears euvolemic.    He wants to go home today.  [facility] He tells me that he goes to the one in [place], but prior records also show that he has been seen in the [facility].  He will need to follow-up with [facility] cardiology in 2 weeks.  He has seen Dr. Santiago there before.  Their records also indicate that he has seen Dr. Weiss in [place] previously.    Condition on Discharge: Good.     Physical  "Exam on Discharge:  /96  Pulse 108  Temp 97 °F (36.1 °C)  Resp 20  Ht 67\" (170.2 cm)  Wt 221 lb 12.8 oz (101 kg)  SpO2 95%  BMI 34.74 kg/m2  Physical Exam  Constitutional: On cannula.  Up and ambulatory in the room. Discussed with his nurse, Juanita.   Head: Normocephalic and atraumatic. Edentulous.  Eyes: Conjunctivae and EOM are normal. Pupils are equal, round, and reactive to light.   Neck: Neck supple. No JVD present.   Cardiovascular: Normal rate, regular rhythm, normal heart sounds and intact distal pulses.  Exam reveals no gallop and no friction rub.  No murmur heard.  Pulmonary/Chest: Effort normal and breath sounds normal. No respiratory distress. He has no wheezes. He has no rales. He exhibits no tenderness. Diminished at the bases, but clear.   Abdominal: Soft. Bowel sounds are normal. He exhibits no distension. There is no tenderness. There is no rebound and no guarding.   Musculoskeletal: Normal range of motion. He exhibits no edema, tenderness or deformity.   Neurological: He is alert. He displays normal reflexes. No cranial nerve deficit. He exhibits normal muscle tone.   Skin: Skin is warm and dry. No rash noted.   Psychiatric: Much brighter affect again today and more conversant.    Discharge Disposition:  Home with his family. He will continue with home health.     Discharge Medications:   Froy Hoyos   Home Medication Instructions KENDRA:[id number]    Printed on:08/19/17 0906   Medication Information                      allopurinol (ZYLOPRIM) 100 MG tablet  Take 100 mg by mouth Daily.             aspirin 81 MG EC tablet  Take 81 mg by mouth Daily.             atorvastatin (LIPITOR) 20 MG tablet  Take 20 mg by mouth Daily.             dabigatran etexilate (PRADAXA) 150 MG capsu  Take 150 mg by mouth 2 (Two) Times a Day.             diltiaZEM CD (CARDIZEM CD) 240 MG 24 hr capsule  Take 1 capsule by mouth Daily.             divalproex (DEPAKOTE) 500 MG DR tablet  Take 1,000 mg by mouth " Daily.             furosemide (LASIX) 40 MG tablet  Take 40 mg by mouth 2 (Two) Times a Day.             guaiFENesin (MUCINEX) 600 MG 12 hr tablet  Take 2 tablets by mouth Every 12 (Twelve) Hours.             ipratropium-albuterol (COMBIVENT RESPIMAT)  MCG/ACT inhaler  Inhale 1 puff 4 (Four) Times a Day As Needed for Wheezing.             levoFLOXacin (LEVAQUIN) 750 MG tablet  Take 1 tablet by mouth Daily. Indications: Pneumonia             lisinopril (PRINIVIL,ZESTRIL) 40 MG tablet  Take 40 mg by mouth daily.             metoprolol succinate XL (TOPROL-XL) 100 MG 24 hr tablet  Take 100 mg by mouth 2 (Two) Times a Day.             QUEtiapine (SEROquel) 400 MG tablet  Take 400 mg by mouth Every Night.               Discharge Diet:   Diet Instructions     Diet: Cardiac; Thin       Discharge Diet:  Cardiac   Fluid Consistency:  Thin               Activity at Discharge:   Activity Instructions     Activity as Tolerated                   Follow-up Appointments:   [facility]   2. Cardiology at [facility] in 2 weeks.     Test Results Pending at Discharge: None.     Clint Torres DO  08/19/17  9:46 AM    Time: 25 minutes.

## 2017-08-19 NOTE — PLAN OF CARE
Problem: Fall Risk (Adult)  Goal: Identify Related Risk Factors and Signs and Symptoms  Outcome: Ongoing (interventions implemented as appropriate)    08/19/17 0443   Fall Risk   Fall Risk: Related Risk Factors age-related changes;culprit medication(s)   Fall Risk: Signs and Symptoms presence of risk factors       Goal: Absence of Falls  Outcome: Ongoing (interventions implemented as appropriate)    08/19/17 0443   Fall Risk (Adult)   Absence of Falls making progress toward outcome         Problem: Sepsis (Adult)  Goal: Signs and Symptoms of Listed Potential Problems Will be Absent or Manageable (Sepsis)  Outcome: Ongoing (interventions implemented as appropriate)    08/19/17 0443   Sepsis   Problems Assessed (Sepsis) all   Problems Present (Sepsis) none         Problem: Respiratory Insufficiency (Adult)  Goal: Acid/Base Balance  Outcome: Ongoing (interventions implemented as appropriate)    08/19/17 0443   Respiratory Insufficiency (Adult)   Acid/Base Balance making progress toward outcome       Goal: Effective Ventilation  Outcome: Ongoing (interventions implemented as appropriate)    08/19/17 0443   Respiratory Insufficiency (Adult)   Effective Ventilation making progress toward outcome         Problem: Skin Integrity Impairment, Risk/Actual (Adult)  Goal: Skin Integrity/Wound Healing  Outcome: Ongoing (interventions implemented as appropriate)    08/19/17 0443   Skin Integrity Impairment, Risk/Actual (Adult)   Skin Integrity/Wound Healing making progress toward outcome

## 2017-08-19 NOTE — PLAN OF CARE
Problem: Inpatient Occupational Therapy  Goal: Strength Goal LTG- OT  Outcome: Unable to achieve outcome(s) by discharge Date Met:  08/19/17 08/17/17 1508 08/19/17 1357   Strength OT LTG   Strength Goal OT LTG, Date Established 08/17/17 --    Strength Goal OT LTG, Time to Achieve by discharge --    Strength Goal OT LTG, Measure to Achieve Pt will be I BUE HEP to increase strength and endurance for daily tasks --    Strength Goal OT LTG, Date Goal Reviewed --  08/19/17   Strength Goal OT LTG, Outcome --  goal not met   Strength Goal OT LTG, Reason Goal Not Met --  discharged from facility       Goal: Bathing Goal LTG- OT  Outcome: Unable to achieve outcome(s) by discharge Date Met:  08/19/17 08/17/17 1508 08/19/17 1357   Bathing OT LTG   Bathing Goal OT LTG, Date Established 08/17/17 --    Bathing Goal OT LTG, Time to Achieve by discharge --    Bathing Goal OT LTG, Activity Type upper body bathing;lower body bathing --    Bathing Goal OT LTG, [place] Level conditional independence --    Bathing Goal OT LTG, Date Goal Reviewed --  08/19/17   Bathing Goal OT LTG, Outcome --  goal not met   Bathing Goal OT LTG, Reason Goal Not Met --  discharged from facility       Goal: LB Dressing Goal LTG- OT  Outcome: Unable to achieve outcome(s) by discharge Date Met:  08/19/17 08/17/17 1508 08/19/17 1357   LB Dressing OT LTG   LB Dressing Goal OT LTG, Date Established 08/17/17 --    LB Dressing Goal OT LTG, Time to Achieve by discharge --    LB Dressing Goal OT LTG, [place] Level conditional independence --    LB Dressing Goal OT LTG, Additional Goal pt may need AE to complete LE dressing --    LB Dressing Goal OT LTG, Date Goal Reviewed --  08/19/17   LB Dressing Goal OT LTG, Outcome --  goal not met   LB Dressing Goal OT LTG, Reason Goal Not Met --  discharged from facility

## 2017-08-19 NOTE — PROGRESS NOTES
Continued Stay Note   [place]     Patient Name: Froy Hoyos  MRN: [id number]  Today's Date: 8/19/2017    Admit Date: 8/15/2017          Discharge Plan       08/19/17 1025    Case Management/Social Work Plan    Plan ELYSSA spoke to Sandra [facility] RN on call and faxed dc summary to [facility] at [phone].  COLBY Carrizales.    Patient/Family In Agreement With Plan yes              Discharge Codes       08/19/17 1025    Discharge Codes    Discharge Codes   Home with A with [facility]        Expected Discharge Date and Time     Expected Discharge Date Expected Discharge Time    Aug 19, 2017             COLBY Gardner

## 2017-08-19 NOTE — PLAN OF CARE
Problem: Inpatient Physical Therapy  Goal: Bed Mobility Goal LTG- PT  Outcome: Outcome(s) achieved Date Met:  08/19/17 08/17/17 1612 08/19/17 1456   Bed Mobility PT LTG   Bed Mobility PT LTG, Date Established 08/17/17 --    Bed Mobility PT LTG, Time to Achieve by discharge --    Bed Mobility PT LTG, Activity Type all bed mobility --    Bed Mobility PT LTG, [place] Level conditional independence --    Bed Mobility PT Goal LTG, Assist Device bed rails --    Bed Mobility PT LTG, Date Goal Reviewed --  08/19/17   Bed Mobility PT LTG, Outcome --  goal met       Goal: Transfer Training Goal 1 LTG- PT  Outcome: Outcome(s) achieved Date Met:  08/19/17 08/17/17 1612 08/19/17 1456   Transfer Training PT LTG   Transfer Training PT LTG, Date Established 08/17/17 --    Transfer Training PT LTG, Time to Achieve by discharge --    Transfer Training PT LTG, Activity Type bed to chair /chair to bed;sit to stand/stand to sit --    Transfer Training PT LTG, [place] Level supervision required --    Transfer Training PT LTG, Date Goal Reviewed --  08/19/17   Transfer Training PT LTG, Outcome --  goal met       Goal: Gait Training Goal LTG- PT  Outcome: Unable to achieve outcome(s) by discharge Date Met:  08/19/17 08/17/17 1612 08/19/17 1456   Gait Training PT LTG   Gait Training Goal PT LTG, Date Established 08/17/17 --    Gait Training Goal PT LTG, Time to Achieve by discharge --    Gait Training Goal PT LTG, [place] Level supervision required --    Gait Training Goal PT LTG, Distance to Achieve 200' --    Gait Training Goal PT LTG, Date Goal Reviewed --  08/19/17   Gait Training Goal PT LTG, Outcome --  goal not met   Gait Training Goal PT LTG, Reason Goal Not Met --  discharged from facility       Goal: Strength Goal LTG- PT  Outcome: Outcome(s) achieved Date Met:  08/19/17 08/17/17 1612 08/19/17 1456   Strength Goal PT LTG   Strength Goal PT LTG, Date Established 08/17/17 --    Strength Goal PT LTG, Time  to Achieve by discharge --    Strength Goal PT LTG, Functional Goal Supine and sitting LE exercises x20 reps --    Strength Goal PT LTG, Date Goal Reviewed --  08/19/17   Strength Goal PT LTG, Outcome --  goal met

## 2017-08-19 NOTE — THERAPY DISCHARGE NOTE
Acute Care - Occupational Therapy Discharge Summary  [facility]     Patient Name: Froy Hoyos  : 1950  MRN: [id number]    Today's Date: 2017  Onset of Illness/Injury or Date of Surgery Date: 08/15/17    Date of Referral to OT: 17  Referring Physician: Dr Torres      Admit Date: 8/15/2017        OT Recommendation and Plan    Visit Dx:    ICD-10-CM ICD-9-CM   1. Impaired mobility and ADLs Z74.09 799.89   2. Impaired functional mobility, balance, gait, and endurance Z74.09 V49.89                     OT Goals       17 1357 17 1508       Strength OT LTG    Strength Goal OT LTG, Date Established  17  -AC     Strength Goal OT LTG, Time to Achieve  by discharge  -AC     Strength Goal OT LTG, Measure to Achieve  Pt will be I BUE HEP to increase strength and endurance for daily tasks  -AC     Strength Goal OT LTG, Date Goal Reviewed 17  -DANA      Strength Goal OT LTG, Outcome goal not met  -DANA      Strength Goal OT LTG, Reason Goal Not Met discharged from facility  -DANA      Bathing OT LTG    Bathing Goal OT LTG, Date Established  17  -AC     Bathing Goal OT LTG, Time to Achieve  by discharge  -AC     Bathing Goal OT LTG, Activity Type  upper body bathing;lower body bathing  -AC     Bathing Goal OT LTG, [place] Level  conditional independence  -AC     Bathing Goal OT LTG, Date Goal Reviewed 17  -DANA      Bathing Goal OT LTG, Outcome goal not met  -DANA      Bathing Goal OT LTG, Reason Goal Not Met discharged from facility  -DANA      LB Dressing OT LTG    LB Dressing Goal OT LTG, Date Established  17  -AC     LB Dressing Goal OT LTG, Time to Achieve  by discharge  -AC     LB Dressing Goal OT LTG, [place] Level  conditional independence  -AC     LB Dressing Goal OT LTG, Additional Goal  pt may need AE to complete LE dressing  -AC     LB Dressing Goal OT LTG, Date Goal Reviewed 17  -DANA      LB Dressing Goal OT LTG, Outcome goal not met  -DANA      LB  Dressing Goal OT LTG, Reason Goal Not Met discharged from facility  -DANA        User Key  (r) = Recorded By, (t) = Taken By, (c) = Cosigned By    Initials Name Provider Type    DANA Alejandro Patino, OTR/L Occupational Therapist    SANDEEP Zurita, OTR/L Occupational Therapist                Outcome Measures       08/18/17 1100 08/17/17 1500 08/17/17 1421    How much help from another person do you currently need...    Turning from your back to your side while in flat bed without using bedrails?   4  -PB (r) MS (t) PB (c)    Moving from lying on back to sitting on the side of a flat bed without bedrails?   3  -PB (r) MS (t) PB (c)    Moving to and from a bed to a chair (including a wheelchair)?   3  -PB (r) MS (t) PB (c)    Standing up from a chair using your arms (e.g., wheelchair, bedside chair)?   3  -PB (r) MS (t) PB (c)    Climbing 3-5 steps with a railing?   3  -PB (r) MS (t) PB (c)    To walk in hospital room?   3  -PB (r) MS (t) PB (c)    AM-PAC 6 Clicks Score   19  -PB (r) MS (t)    How much help from another is currently needed...    Putting on and taking off regular lower body clothing? 3  -ND 2  -AC     Bathing (including washing, rinsing, and drying) 3  -ND 3  -AC     Toileting (which includes using toilet bed pan or urinal) 3  -ND 3  -AC     Putting on and taking off regular upper body clothing 4  -ND 4  -AC     Taking care of personal grooming (such as brushing teeth) 4  -ND 4  -AC     Eating meals 4  -ND 4  -AC     Score 21  -ND 20  -AC     Functional Assessment    Outcome Measure Options  AM-PAC 6 Clicks Daily Activity (OT)  - AM-PAC 6 Clicks Basic Mobility (PT)  -PB (r) MS (t) PB (c)      User Key  (r) = Recorded By, (t) = Taken By, (c) = Cosigned By    Initials Name Provider Type    PB Gilberto Loza, PT DPT Physical Therapist    AC Anisha Zurita, OTR/L Occupational Therapist    ND Stacy Flores, OTR/L Occupational Therapist    MS Tanesha Galaviz, PT Student PT Student              OT  Discharge Summary  Reason for Discharge: Discharge from facility  Outcomes Achieved: Refer to plan of care for updates on goals achieved  Discharge Destination: Home with assist      Alejandro Patino OTR/NEGAR  8/19/2017

## 2017-08-19 NOTE — THERAPY DISCHARGE NOTE
Acute Care - Physical Therapy Discharge Summary  [facility]       Patient Name: Froy Hoyos  : 1950  MRN: [id number]    Today's Date: 2017  Onset of Illness/Injury or Date of Surgery Date: 08/15/17    Date of Referral to PT: 17  Referring Physician: Dr Torres      Admit Date: 8/15/2017      PT Recommendation and Plan    Visit Dx:    ICD-10-CM ICD-9-CM   1. Impaired mobility and ADLs Z74.09 799.89   2. Impaired functional mobility, balance, gait, and endurance Z74.09 V49.89             Outcome Measures       17 1100 17 1500 17 1421    How much help from another person do you currently need...    Turning from your back to your side while in flat bed without using bedrails?   4  -PB (r) MS (t) PB (c)    Moving from lying on back to sitting on the side of a flat bed without bedrails?   3  -PB (r) MS (t) PB (c)    Moving to and from a bed to a chair (including a wheelchair)?   3  -PB (r) MS (t) PB (c)    Standing up from a chair using your arms (e.g., wheelchair, bedside chair)?   3  -PB (r) MS (t) PB (c)    Climbing 3-5 steps with a railing?   3  -PB (r) MS (t) PB (c)    To walk in hospital room?   3  -PB (r) MS (t) PB (c)    AM-PAC 6 Clicks Score   19  -PB (r) MS (t)    How much help from another is currently needed...    Putting on and taking off regular lower body clothing? 3  -ND 2  -AC     Bathing (including washing, rinsing, and drying) 3  -ND 3  -AC     Toileting (which includes using toilet bed pan or urinal) 3  -ND 3  -AC     Putting on and taking off regular upper body clothing 4  -ND 4  -AC     Taking care of personal grooming (such as brushing teeth) 4  -ND 4  -AC     Eating meals 4  -ND 4  -AC     Score 21  -ND 20  -AC     Functional Assessment    Outcome Measure Options  AM-PAC 6 Clicks Daily Activity (OT)  -AC AM-PAC 6 Clicks Basic Mobility (PT)  -PB (r) MS (t) PB (c)      User Key  (r) = Recorded By, (t) = Taken By, (c) = Cosigned By    Initials Name Provider Type     PB Gilberto Loza, PT DPT Physical Therapist    SANDEEP Zurita, OTR/L Occupational Therapist    THERESE Flores, OTR/L Occupational Therapist    MS Tanesha Galaviz, PT Student PT Student                      IP PT Goals       08/19/17 1456 08/17/17 1612       Bed Mobility PT LTG    Bed Mobility PT LTG, Date Established  08/17/17  -PB (r) MS (t) PB (c)     Bed Mobility PT LTG, Time to Achieve  by discharge  -PB (r) MS (t) PB (c)     Bed Mobility PT LTG, Activity Type  all bed mobility  -PB (r) MS (t) PB (c)     Bed Mobility PT LTG, [place] Level  conditional independence  -PB (r) MS (t) PB (c)     Bed Mobility PT Goal  LTG, Assist Device  bed rails  -PB (r) MS (t) PB (c)     Bed Mobility PT LTG, Date Goal Reviewed 08/19/17  -KATHRYN      Bed Mobility PT LTG, Outcome goal met  -KATHRYN      Transfer Training PT LTG    Transfer Training PT LTG, Date Established  08/17/17  -PB (r) MS (t) PB (c)     Transfer Training PT LTG, Time to Achieve  by discharge  -PB (r) MS (t) PB (c)     Transfer Training PT LTG, Activity Type  bed to chair /chair to bed;sit to stand/stand to sit  -PB (r) MS (t) PB (c)     Transfer Training PT LTG, [place] Level  supervision required  -PB (r) MS (t) PB (c)     Transfer Training PT  LTG, Date Goal Reviewed 08/19/17  -KATHRYN      Transfer Training PT LTG, Outcome goal met  -KATHRYN      Gait Training PT LTG    Gait Training Goal PT LTG, Date Established  08/17/17  -PB (r) MS (t) PB (c)     Gait Training Goal PT LTG, Time to Achieve  by discharge  -PB (r) MS (t) PB (c)     Gait Training Goal PT LTG, [place] Level  supervision required  -PB (r) MS (t) PB (c)     Gait Training Goal PT LTG, Distance to Achieve  200'  -PB (r) MS (t) PB (c)     Gait Training Goal PT LTG, Date Goal Reviewed 08/19/17  -KATHRYN      Gait Training Goal PT LTG, Outcome goal not met  -KATHRYN      Gait Training Goal PT LTG, Reason Goal Not Met discharged from facility  -KATHRYN      Strength Goal PT LTG    Strength Goal PT LTG,  Date Established  08/17/17  -PB (r) MS (t) PB (c)     Strength Goal PT LTG, Time to Achieve  by discharge  -PB (r) MS (t) PB (c)     Strength Goal PT LTG, Functional Goal  Supine and sitting LE exercises x20 reps  -PB (r) MS (t) PB (c)     Strength Goal PT LTG, Date Goal Reviewed 08/19/17  -KATHRYN      Strength Goal PT LTG, Outcome goal met  -KATHRYN        User Key  (r) = Recorded By, (t) = Taken By, (c) = Cosigned By    Initials Name Provider Type    KATHRYN Lua, PTA Physical Therapy Assistant    PB Gilberto Loza, PT DPT Physical Therapist    MS Tanesha Galaviz, PT Student PT Student              PT Discharge Summary  Anticipated Discharge Disposition: home  Reason for Discharge: Discharge from facility  Outcomes Achieved: Refer to plan of care for updates on goals achieved  Discharge Destination: Home      Alfredo Lua, CHRIS   8/19/2017

## 2017-08-20 LAB
BACTERIA SPEC AEROBE CULT: NORMAL
BACTERIA SPEC AEROBE CULT: NORMAL

## 2017-08-21 ENCOUNTER — TELEPHONE (OUTPATIENT)
Dept: CARDIOLOGY | Age: 67
End: 2017-08-21

## 2017-08-21 NOTE — PAYOR COMM NOTE
"DC HOME 8-19-17      Braxton Hoyos (66 y.o. Male)     Date of Birth Social Security Number Address Home Phone MRN    1950  [address] [place] [address] [phone] [id number]    Orthodoxy Marital Status          None        Admission Date Admission Type Admitting Provider Attending Provider Department, Room/Bed    8/15/17 Urgent Clint Torres DO  [facility] 4C, 492/1    Discharge Date Discharge Disposition Discharge Destination        8/19/2017 Home or Self Care             Attending Provider: (none)    Allergies:  No Known Allergies    Isolation:  None   Infection:  None   Code Status:  Prior    Ht:  67\" (170.2 cm)   Wt:  221 lb 12.8 oz (101 kg)    Admission Cmt:  None   Principal Problem:  None                Active Insurance as of 8/15/2017     Primary Coverage     Payor Plan Insurance Group Employer/Plan Group    [employer] [id number]      Payor Plan Address Payor Plan Phone Number Effective From Effective To    [employer] [phone] 8/15/2017     [address] [place] [id number]       Subscriber Name Subscriber Birth Date Member ID       BRAXTON HOYOS [id number]                 Emergency Contacts      (Rel.) Home Phone Work Phone Mobile Phone    Roma Liu/Braxton (Son) [phone] -- --    Alma Rosa Hoyos (Daughter) -- -- [phone]               Discharge Summary      Clint Torres DO at 8/19/2017  9:22 AM              [facility] Medicine Services  DISCHARGE SUMMARY       Date of Admission: 8/15/2017  Date of Discharge:  8/19/2017  [facility]     Presenting Problem/History of Present Illness:  Shortness of breath.      Final Discharge Diagnoses:  1.  Acute on chronic hypoxic and hypercarbic respiratory failure.  2.  Healthcare associated pneumonia presenting with sepsis.  3.  Atrial fibrillation, on chronic anticoagulation with Pradaxa.   4.  Acute on " chronic systolic heart failure with ejection fraction 40-45%.  5.  Coronary artery disease, exact details unknown, nothing specific in [facility] notes.  6.  Systemic arterial hypertension.  7.  Chronic kidney disease, stage III.  8.  Remote tobacco abuse with likely underlying chronic obstructive pulmonary disease.  9.  History of polysubstance abuse.  10.  Metabolic encephalopathy, resolved.  11.  Bipolar disorder.  12.  Normocytic anemia.  13.  Thrombocytopenia, mild, chronic, resolved at present.    Consults: Dr. Recio with pulmonary.     Procedures Performed: None.     Pertinent Test Results:   Imaging Results (last 7 days)     Procedure Component Value Units Date/Time    XR Chest 1 View [[id number]] Collected:  08/15/17 0820     Updated:  08/15/17 0824    Narrative:       Exam:   XR CHEST 1 VW-       Date:  08/15/2017      History:  Male, age  66 years;hypoxia     COMPARISON:  None.     Findings :     The heart and mediastinum are borderline in size which may be partially  due to low lung volumes as well as technique. Low lung volumes.. Lungs  are without focal infiltrate, mass or effusions.  The bones show no  acute pathology.         Impression:       Impression:     Mild prominence of cardiac mediastinal silhouette, with mild prominence  of the central pulmonary vasculature, concerning for mild/early fluid  overload.     This report was finalized on 08/15/2017 08:21 by Dr. Lu Howell MD.    XR Chest 1 View [[id number]] Collected:  08/17/17 0740     Updated:  08/17/17 0745    Narrative:       EXAMINATION: XR CHEST 1 VW- 8/17/2017 7:40 AM CDT     HISTORY: Respiratory failure.     REPORT: Comparison is made with the study from 08/15/2017.     Lungs are moderately hypoaerated, there is central and basilar vascular  crowding, with normal heart size. No pulmonary consolidation is  identified. There is no pneumothorax or definite effusion. The osseous  structures are unremarkable.       Impression:       Moderate  pulmonary hypoventilation with vascular crowding,  no active CHF or definite pneumonia.  This report was finalized on 08/17/2017 07:42 by Dr. Daniel Jimenez MD.        Lab Results (last 7 days)     Procedure Component Value Units Date/Time    Urinalysis With / Microscopic If Indicated [[id number]]  (Abnormal) Collected:  08/15/17 0023    Specimen:  Urine from Urine, Clean Catch Updated:  08/15/17 0032     Color, UA Yellow     Appearance, UA Clear     pH, UA <=5.0     Specific Gravity, UA >1.030 (H)     Glucose, UA Negative     Ketones, UA Negative     Bilirubin, UA Negative     Blood, UA Negative     Protein, UA Negative     Leuk Esterase, UA Negative     Nitrite, UA Negative     Urobilinogen, UA 1.0 E.U./dL    Narrative:       Urine microscopic not indicated.    Blood Gas, Arterial [[id number]]  (Abnormal) Collected:  08/15/17 0226    Specimen:  Arterial Blood Updated:  08/15/17 0230     Site Arterial: right radial     Cristobal's Test --      Documented in Rapid Comm        pH, Arterial 7.286 (L) pH units      pCO2, Arterial 77.3 (C) mm Hg      pO2, Arterial 96.2 mm Hg      HCO3, Arterial 36.0 (H) mmol/L      Base Excess, Arterial 6.2 (H) mmol/L      O2 Saturation, Arterial 96.2 %      O2 Saturation Calculated 96.2 %      Barometric Pressure for Blood Gas -- mmHg       Component not reported at this site.        Modality BiPAP     FIO2 40 %      Rate 12.0 Breaths/minute      IPAP 18     EPAP 8     Notified [facility] [id number]     Notified By [id number]     Notified Time 8/15/2017 2:29:45 AM    Narrative:       Serial Number: [id number]    : [id number]    Blood Gas, Arterial [[id number]]  (Abnormal) Collected:  08/15/17 0331    Specimen:  Arterial Blood Updated:  08/15/17 0333     Site Arterial: right radial     Cristobal's Test --      Documented in Rapid Comm        pH, Arterial 7.372 pH units      pCO2, Arterial 60.2 (H) mm Hg      pO2, Arterial 57.7 (L) mm Hg      HCO3, Arterial 34.2 (H) mmol/L      Base Excess, Arterial 6.8 (H) mmol/L       O2 Saturation, Arterial 88.6 (L) %      O2 Saturation Calculated 88.6 (L) %      Barometric Pressure for Blood Gas -- mmHg       Component not reported at this site.        Modality BiPAP     FIO2 21 %      Rate 12.0 Breaths/minute      IPAP 20     EPAP 8    Narrative:       Serial Number: [id number]    : [id number]    Lactic Acid, Plasma [[id number]]  (Normal) Collected:  08/15/17 0523    Specimen:  Blood Updated:  08/15/17 0546     Lactate 1.5 mmol/L     CBC Auto Differential [[id number]]  (Abnormal) Collected:  08/15/17 0851    Specimen:  Blood Updated:  08/15/17 0907     WBC 9.72 10*3/mm3      RBC 3.43 (L) 10*6/mm3      Hemoglobin 9.8 (L) g/dL      Hematocrit 31.5 (L) %      MCV 91.8 fL      MCH 28.6 pg      MCHC 31.1 (L) g/dL      RDW 14.7 %      RDW-SD 49.2 fl      MPV 11.1 fL      Platelets 129 (L) 10*3/mm3      Neutrophil % 81.6 (H) %      Lymphocyte % 9.5 (L) %      Monocyte % 5.5 %      Eosinophil % 2.9 %      Basophil % 0.2 %      Immature Grans % 0.3 %      Neutrophils, Absolute 7.94 10*3/mm3      Lymphocytes, Absolute 0.92 10*3/mm3      Monocytes, Absolute 0.53 10*3/mm3      Eosinophils, Absolute 0.28 10*3/mm3      Basophils, Absolute 0.02 10*3/mm3      Immature Grans, Absolute 0.03 10*3/mm3     Magnesium [[id number]]  (Normal) Collected:  08/15/17 0851    Specimen:  Blood Updated:  08/15/17 0913     Magnesium 1.8 mg/dL     Phosphorus [[id number]]  (Normal) Collected:  08/15/17 0851    Specimen:  Blood Updated:  08/15/17 0913     Phosphorus 4.5 mg/dL     Comprehensive Metabolic Panel [[id number]]  (Abnormal) Collected:  08/15/17 0851    Specimen:  Blood Updated:  08/15/17 0913     Glucose 95 mg/dL      BUN 17 mg/dL      Creatinine 1.21 mg/dL      Sodium 145 mmol/L      Potassium 3.7 mmol/L      Chloride 102 mmol/L      CO2 34.0 (H) mmol/L      Calcium 8.2 (L) mg/dL      Total Protein 6.5 g/dL      Albumin 3.30 (L) g/dL      ALT (SGPT) 27 U/L      AST (SGOT) 15 U/L      Alkaline Phosphatase 48 U/L       Total Bilirubin 0.5 mg/dL      eGFR Non African Amer 60 (L) mL/min/1.73      Globulin 3.2 gm/dL      A/G Ratio 1.0 (L) g/dL      BUN/Creatinine Ratio 14.0     Anion Gap 9.0 mmol/L     Troponin [[id number]]  (Normal) Collected:  08/15/17 0851    Specimen:  Blood Updated:  08/15/17 0924     Troponin I 0.021 ng/mL     CBC (No Diff) [[id number]]  (Abnormal) Collected:  08/16/17 0240    Specimen:  Blood Updated:  08/16/17 0333     WBC 6.96 10*3/mm3      RBC 3.37 (L) 10*6/mm3      Hemoglobin 9.6 (L) g/dL      Hematocrit 30.9 (L) %      MCV 91.7 fL      MCH 28.5 pg      MCHC 31.1 (L) g/dL      RDW 14.6 %      RDW-SD 48.8 fl      MPV 11.0 fL      Platelets 114 (L) 10*3/mm3     Blood Gas, Arterial [[id number]]  (Abnormal) Collected:  08/16/17 0337    Specimen:  Arterial Blood Updated:  08/16/17 0341     Site Arterial: right radial     Cristobal's Test --      Documented in Rapid Comm        pH, Arterial 7.406 pH units      pCO2, Arterial 59.5 (H) mm Hg      pO2, Arterial 58.0 (L) mm Hg      HCO3, Arterial 36.5 (H) mmol/L      Base Excess, Arterial 9.4 (H) mmol/L      O2 Saturation, Arterial 89.6 (L) %      O2 Saturation Calculated 89.6 (L) %      Barometric Pressure for Blood Gas -- mmHg       Component not reported at this site.        Modality BiPAP     FIO2 22 %      IPAP 20     EPAP 8    Narrative:       Serial Number: [id number]    : [id number]    Basic Metabolic Panel [[id number]]  (Abnormal) Collected:  08/16/17 0241    Specimen:  Blood Updated:  08/16/17 0346     Glucose 104 (H) mg/dL      BUN 16 mg/dL      Creatinine 1.07 mg/dL      Sodium 145 mmol/L      Potassium 3.6 mmol/L      Chloride 101 mmol/L      CO2 38.0 (H) mmol/L      Calcium 8.1 (L) mg/dL      eGFR Non African Amer 69 mL/min/1.73      BUN/Creatinine Ratio 15.0     Anion Gap 6.0 mmol/L     Narrative:       GFR Normal >60  Chronic Kidney Disease <60  Kidney Failure <15    Blood Gas, Arterial With Co-Ox [[id number]]  (Abnormal) Collected:  08/17/17 0139    Specimen:   Arterial Blood Updated:  08/17/17 0143     Site Arterial: right radial     Cristobal's Test --      Documented in Rapid Comm        pH, Arterial 7.396 pH units      pCO2, Arterial 64.7 (H) mm Hg      pO2, Arterial 54.2 (L) mm Hg      HCO3, Arterial 38.8 (H) mmol/L      Base Excess, Arterial 11.8 (H) mmol/L      Hemoglobin, Blood Gas 10.8 (L) g/dL      Hematocrit, Blood Gas 32.0 (L) %      Oxyhemoglobin 87.0 (L) %      Methemoglobin 0.30 (L) %      Carboxyhemoglobin 0.7 %      Sodium, Arterial 138.6 mmol/L      Potassium, Arterial 3.71 mmol/L      Barometric Pressure for Blood Gas -- mmHg       Component not reported at this site.        Modality Cannula     Flow Rate 2.00 lpm     Narrative:       Serial Number: [id number]    : [id number]    Urine Culture [[id number]]  (Normal) Collected:  08/15/17 0023    Specimen:  Urine from Urine, Clean Catch Updated:  08/17/17 0634     Urine Culture No growth at 2 days    Urine Culture [[id number]]  (Normal) Collected:  08/15/17 0843    Specimen:  Urine from Urine, Clean Catch Updated:  08/17/17 0634     Urine Culture No growth at 2 days    Basic Metabolic Panel [[id number]]  (Abnormal) Collected:  08/17/17 1110    Specimen:  Blood Updated:  08/17/17 1215     Glucose 94 mg/dL      BUN 13 mg/dL      Creatinine 1.09 mg/dL      Sodium 145 mmol/L      Potassium 4.7 mmol/L      Chloride 105 mmol/L      CO2 33.0 (H) mmol/L      Calcium 8.5 mg/dL      eGFR Non African Amer 67 mL/min/1.73      BUN/Creatinine Ratio 11.9     Anion Gap 7.0 mmol/L     Narrative:       GFR Normal >60  Chronic Kidney Disease <60  Kidney Failure <15    CBC (No Diff) [[id number]]  (Abnormal) Collected:  08/18/17 0635    Specimen:  Blood Updated:  08/18/17 0714     WBC 6.05 10*3/mm3      RBC 3.41 (L) 10*6/mm3      Hemoglobin 9.5 (L) g/dL      Hematocrit 30.1 (L) %      MCV 88.3 fL      MCH 27.9 (L) pg      MCHC 31.6 (L) g/dL      RDW 14.8 %      RDW-SD 47.4 fl      MPV 11.1 fL      Platelets 136 10*3/mm3     Basic  Metabolic Panel [[id number]]  (Abnormal) Collected:  08/18/17 0636    Specimen:  Blood Updated:  08/18/17 0727     Glucose 112 (H) mg/dL      BUN 17 mg/dL      Creatinine 1.02 mg/dL      Sodium 143 mmol/L      Potassium 3.8 mmol/L      Chloride 101 mmol/L      CO2 31.0 mmol/L      Calcium 8.8 mg/dL      eGFR Non African Amer 73 mL/min/1.73      BUN/Creatinine Ratio 16.7     Anion Gap 11.0 mmol/L     Narrative:       GFR Normal >60  Chronic Kidney Disease <60  Kidney Failure <15    Blood Culture [[id number]]  (Normal) Collected:  08/15/17 0851    Specimen:  Blood from Arm, Left Updated:  08/18/17 1001     Blood Culture No growth at 3 days    Blood Culture [[id number]]  (Normal) Collected:  08/15/17 0834    Specimen:  Blood from Hand, Left Updated:  08/18/17 1001     Blood Culture No growth at 3 days        Hospital Course:  The patient is a 66 y.o. male who [facility] after presenting there with shortness of breath, worsening hypoxemia above his baseline, lactic acidosis, and possible pneumonia.  The patient has not been at our facility since September 2016.  He has more frequent admissions at [facility] as his cardiologist, Dr. Santiago, attends there.  He has a history of coronary artery disease that is poorly explained in their office and hospital notes.  The patient is a poor historian with regards to this as well.  He also has a history of systolic heart failure and atrial fibrillation.  When he first presented here, he did not seem to be on chronic anticoagulation, however, the [facility] and [facility] indeed takes [facility]ad[facility]a.  There were many discrepancies on his initial home medication list, all of which have been corrected.  Another thing that made the patient's care difficult at times on this hospitalization is that he proves to be a poor historian even after clearing his metabolic  "encephalopathy.  I reviewed the records from [facility] to delineate further history as well.    He was transferred here on BiPAP.  He benefited from this therapy initially and has now been weaned off.  There is some question as to whether or not he is supposed to be on one of these chronically at night, however, the patient tells me that he will not comply with wearing this even if one is set up.     He was treated with broad-spectrum antibiotics and did be escalated to Levaquin monotherapy.  He will complete another 3 days of oral Levaquin as an outpatient.  He was also treated with Mucinex and inhaled bronchodilators.  I have written him a prescription for Combivent for home use as it appears that he has not been on inhaled bronchodilators in the past.  He does have home oxygen and wears 2 L continuously.  He is currently on 2 L and has kept saturations in the mid 90s.    He was also diuresed with Lasix and is now back on his home dosage.  He appears euvolemic.    He wants to go home today.  [facility] He tells me that he goes to the one in [place], but prior records also show that he has been seen in the [facility].  He will need to follow-up with [facility] cardiology in 2 weeks.  He has seen Dr. Santiago there before.  Their records also indicate that he has seen Dr. Weiss in [place] previously.    Condition on Discharge: Good.     Physical Exam on Discharge:  /96  Pulse 108  Temp 97 °F (36.1 °C)  Resp 20  Ht 67\" (170.2 cm)  Wt 221 lb 12.8 oz (101 kg)  SpO2 95%  BMI 34.74 kg/m2  Physical Exam  Constitutional: On cannula.  Up and ambulatory in the room. Discussed with his nurse, Juanita.   Head: Normocephalic and atraumatic. Edentulous.  Eyes: Conjunctivae and EOM are normal. Pupils are equal, round, and reactive to light.   Neck: Neck supple. No JVD present.   Cardiovascular: Normal rate, regular rhythm, normal heart sounds and intact " distal pulses.  Exam reveals no gallop and no friction rub.  No murmur heard.  Pulmonary/Chest: Effort normal and breath sounds normal. No respiratory distress. He has no wheezes. He has no rales. He exhibits no tenderness. Diminished at the bases, but clear.   Abdominal: Soft. Bowel sounds are normal. He exhibits no distension. There is no tenderness. There is no rebound and no guarding.   Musculoskeletal: Normal range of motion. He exhibits no edema, tenderness or deformity.   Neurological: He is alert. He displays normal reflexes. No cranial nerve deficit. He exhibits normal muscle tone.   Skin: Skin is warm and dry. No rash noted.   Psychiatric: Much brighter affect again today and more conversant.    Discharge Disposition:  Home with his family. He will continue with home health.     Discharge Medications:   Romain Rfoy   Home Medication Instructions KENDRA:[id number]    Printed on:08/19/17 4794   Medication Information                      allopurinol (ZYLOPRIM) 100 MG tablet  Take 100 mg by mouth Daily.             aspirin 81 MG EC tablet  Take 81 mg by mouth Daily.             atorvastatin (LIPITOR) 20 MG tablet  Take 20 mg by mouth Daily.             dabigatran etexilate (PRADAXA) 150 MG capsu  Take 150 mg by mouth 2 (Two) Times a Day.             diltiaZEM CD (CARDIZEM CD) 240 MG 24 hr capsule  Take 1 capsule by mouth Daily.             divalproex (DEPAKOTE) 500 MG DR tablet  Take 1,000 mg by mouth Daily.             furosemide (LASIX) 40 MG tablet  Take 40 mg by mouth 2 (Two) Times a Day.             guaiFENesin (MUCINEX) 600 MG 12 hr tablet  Take 2 tablets by mouth Every 12 (Twelve) Hours.             ipratropium-albuterol (COMBIVENT RESPIMAT)  MCG/ACT inhaler  Inhale 1 puff 4 (Four) Times a Day As Needed for Wheezing.             levoFLOXacin (LEVAQUIN) 750 MG tablet  Take 1 tablet by mouth Daily. Indications: Pneumonia             lisinopril (PRINIVIL,ZESTRIL) 40 MG tablet  Take 40 mg by mouth  daily.             metoprolol succinate XL (TOPROL-XL) 100 MG 24 hr tablet  Take 100 mg by mouth 2 (Two) Times a Day.             QUEtiapine (SEROquel) 400 MG tablet  Take 400 mg by mouth Every Night.               Discharge Diet:   Diet Instructions     Diet: Cardiac; Thin       Discharge Diet:  Cardiac   Fluid Consistency:  Thin               Activity at Discharge:   Activity Instructions     Activity as Tolerated                   Follow-up Appointments:   [facility]   2. Cardiology at [facility] in 2 weeks.     Test Results Pending at Discharge: None.     Clint Torres DO  08/19/17  9:46 AM    Time: 25 minutes.            Electronically signed by Clint Torres DO at 8/19/2017  9:46 AM

## 2018-03-18 ENCOUNTER — HOSPITAL ENCOUNTER (INPATIENT)
Facility: HOSPITAL | Age: 68
LOS: 7 days | Discharge: HOME | End: 2018-03-25
Attending: INTERNAL MEDICINE | Admitting: FAMILY MEDICINE

## 2018-03-18 DIAGNOSIS — Z74.09 IMPAIRED MOBILITY: ICD-10-CM

## 2018-03-18 PROBLEM — I48.91 ATRIAL FIBRILLATION WITH RVR (HCC): Status: ACTIVE | Noted: 2018-03-18

## 2018-03-18 LAB
ANION GAP SERPL CALCULATED.3IONS-SCNC: ABNORMAL MMOL/L (ref 4–13)
BUN BLD-MCNC: 20 MG/DL (ref 5–21)
BUN/CREAT SERPL: 21.5 (ref 7–25)
CALCIUM SPEC-SCNC: 8.2 MG/DL (ref 8.4–10.4)
CHLORIDE SERPL-SCNC: 97 MMOL/L (ref 98–110)
CK MB SERPL-CCNC: 0.33 NG/ML (ref 0–5)
CK SERPL-CCNC: <20 U/L (ref 0–203)
CO2 SERPL-SCNC: >40 MMOL/L (ref 24–31)
CREAT BLD-MCNC: 0.93 MG/DL (ref 0.5–1.4)
GFR SERPL CREATININE-BSD FRML MDRD: 81 ML/MIN/1.73
GLUCOSE BLD-MCNC: 96 MG/DL (ref 70–100)
NT-PROBNP SERPL-MCNC: 4640 PG/ML (ref 0–900)
POTASSIUM BLD-SCNC: 3.9 MMOL/L (ref 3.5–5.3)
SODIUM BLD-SCNC: 145 MMOL/L (ref 135–145)
TROPONIN I SERPL-MCNC: 0.03 NG/ML (ref 0–0.03)

## 2018-03-18 PROCEDURE — 25010000002 VANCOMYCIN PER 500 MG: Performed by: INTERNAL MEDICINE

## 2018-03-18 PROCEDURE — 5A09457 ASSISTANCE WITH RESPIRATORY VENTILATION, 24-96 CONSECUTIVE HOURS, CONTINUOUS POSITIVE AIRWAY PRESSURE: ICD-10-PCS | Performed by: FAMILY MEDICINE

## 2018-03-18 PROCEDURE — 25010000002 PIPERACILLIN SOD-TAZOBACTAM PER 1 G: Performed by: INTERNAL MEDICINE

## 2018-03-18 PROCEDURE — 82553 CREATINE MB FRACTION: CPT | Performed by: INTERNAL MEDICINE

## 2018-03-18 PROCEDURE — 94799 UNLISTED PULMONARY SVC/PX: CPT

## 2018-03-18 PROCEDURE — 83880 ASSAY OF NATRIURETIC PEPTIDE: CPT | Performed by: INTERNAL MEDICINE

## 2018-03-18 PROCEDURE — 80048 BASIC METABOLIC PNL TOTAL CA: CPT | Performed by: INTERNAL MEDICINE

## 2018-03-18 PROCEDURE — 94660 CPAP INITIATION&MGMT: CPT

## 2018-03-18 PROCEDURE — 84484 ASSAY OF TROPONIN QUANT: CPT | Performed by: INTERNAL MEDICINE

## 2018-03-18 PROCEDURE — 82550 ASSAY OF CK (CPK): CPT | Performed by: INTERNAL MEDICINE

## 2018-03-18 RX ORDER — SODIUM CHLORIDE 0.9 % (FLUSH) 0.9 %
1-10 SYRINGE (ML) INJECTION AS NEEDED
Status: DISCONTINUED | OUTPATIENT
Start: 2018-03-18 | End: 2018-03-25 | Stop reason: HOSPADM

## 2018-03-18 RX ORDER — DIVALPROEX SODIUM 500 MG/1
1000 TABLET, DELAYED RELEASE ORAL DAILY
Status: DISCONTINUED | OUTPATIENT
Start: 2018-03-18 | End: 2018-03-25 | Stop reason: HOSPADM

## 2018-03-18 RX ORDER — ALLOPURINOL 100 MG/1
100 TABLET ORAL DAILY
Status: DISCONTINUED | OUTPATIENT
Start: 2018-03-18 | End: 2018-03-25 | Stop reason: HOSPADM

## 2018-03-18 RX ORDER — VANCOMYCIN HYDROCHLORIDE 1 G/200ML
1000 INJECTION, SOLUTION INTRAVENOUS EVERY 12 HOURS
Status: DISCONTINUED | OUTPATIENT
Start: 2018-03-19 | End: 2018-03-20

## 2018-03-18 RX ORDER — GUAIFENESIN 600 MG/1
1200 TABLET, EXTENDED RELEASE ORAL EVERY 12 HOURS SCHEDULED
Status: DISCONTINUED | OUTPATIENT
Start: 2018-03-18 | End: 2018-03-25 | Stop reason: HOSPADM

## 2018-03-18 RX ORDER — FUROSEMIDE 40 MG/1
40 TABLET ORAL
Status: DISCONTINUED | OUTPATIENT
Start: 2018-03-18 | End: 2018-03-20

## 2018-03-18 RX ORDER — VANCOMYCIN HYDROCHLORIDE 1 G/200ML
1000 INJECTION, SOLUTION INTRAVENOUS ONCE
Status: COMPLETED | OUTPATIENT
Start: 2018-03-18 | End: 2018-03-18

## 2018-03-18 RX ORDER — LISINOPRIL 20 MG/1
40 TABLET ORAL DAILY
Status: DISCONTINUED | OUTPATIENT
Start: 2018-03-18 | End: 2018-03-25 | Stop reason: HOSPADM

## 2018-03-18 RX ORDER — QUETIAPINE FUMARATE 200 MG/1
400 TABLET, FILM COATED ORAL NIGHTLY
Status: DISCONTINUED | OUTPATIENT
Start: 2018-03-18 | End: 2018-03-19

## 2018-03-18 RX ORDER — ONDANSETRON 2 MG/ML
4 INJECTION INTRAMUSCULAR; INTRAVENOUS EVERY 6 HOURS PRN
Status: DISCONTINUED | OUTPATIENT
Start: 2018-03-18 | End: 2018-03-25 | Stop reason: HOSPADM

## 2018-03-18 RX ORDER — DILTIAZEM HYDROCHLORIDE 240 MG/1
240 CAPSULE, COATED, EXTENDED RELEASE ORAL
Status: DISCONTINUED | OUTPATIENT
Start: 2018-03-18 | End: 2018-03-19

## 2018-03-18 RX ORDER — FAMOTIDINE 10 MG/ML
20 INJECTION, SOLUTION INTRAVENOUS EVERY 12 HOURS SCHEDULED
Status: DISCONTINUED | OUTPATIENT
Start: 2018-03-18 | End: 2018-03-20 | Stop reason: SDUPTHER

## 2018-03-18 RX ORDER — ATORVASTATIN CALCIUM 10 MG/1
20 TABLET, FILM COATED ORAL NIGHTLY
Status: DISCONTINUED | OUTPATIENT
Start: 2018-03-18 | End: 2018-03-25 | Stop reason: HOSPADM

## 2018-03-18 RX ORDER — DABIGATRAN ETEXILATE 150 MG/1
150 CAPSULE ORAL EVERY 12 HOURS SCHEDULED
Status: DISCONTINUED | OUTPATIENT
Start: 2018-03-18 | End: 2018-03-25 | Stop reason: HOSPADM

## 2018-03-18 RX ORDER — METOPROLOL SUCCINATE 100 MG/1
100 TABLET, EXTENDED RELEASE ORAL
Status: DISCONTINUED | OUTPATIENT
Start: 2018-03-18 | End: 2018-03-19

## 2018-03-18 RX ORDER — ACETAMINOPHEN 325 MG/1
650 TABLET ORAL EVERY 4 HOURS PRN
Status: DISCONTINUED | OUTPATIENT
Start: 2018-03-18 | End: 2018-03-25 | Stop reason: HOSPADM

## 2018-03-18 RX ORDER — ASPIRIN 81 MG/1
81 TABLET ORAL DAILY
Status: DISCONTINUED | OUTPATIENT
Start: 2018-03-18 | End: 2018-03-25 | Stop reason: HOSPADM

## 2018-03-18 RX ADMIN — VANCOMYCIN HYDROCHLORIDE 1000 MG: 1 INJECTION, SOLUTION INTRAVENOUS at 21:25

## 2018-03-18 RX ADMIN — ASPIRIN 81 MG: 81 TABLET ORAL at 20:31

## 2018-03-18 RX ADMIN — QUETIAPINE FUMARATE 400 MG: 200 TABLET ORAL at 20:31

## 2018-03-18 RX ADMIN — TAZOBACTAM SODIUM AND PIPERACILLIN SODIUM 3.38 G: 375; 3 INJECTION, SOLUTION INTRAVENOUS at 21:25

## 2018-03-18 RX ADMIN — FUROSEMIDE 40 MG: 40 TABLET ORAL at 20:31

## 2018-03-18 RX ADMIN — DILTIAZEM HYDROCHLORIDE 240 MG: 240 CAPSULE, EXTENDED RELEASE ORAL at 20:31

## 2018-03-18 RX ADMIN — METOPROLOL SUCCINATE 100 MG: 100 TABLET, FILM COATED, EXTENDED RELEASE ORAL at 20:31

## 2018-03-18 RX ADMIN — ALLOPURINOL 100 MG: 100 TABLET ORAL at 20:31

## 2018-03-18 RX ADMIN — DESMOPRESSIN ACETATE 20 MG: 0.2 TABLET ORAL at 20:31

## 2018-03-18 RX ADMIN — DABIGATRAN ETEXILATE MESYLATE 150 MG: 150 CAPSULE ORAL at 20:31

## 2018-03-18 RX ADMIN — LISINOPRIL 40 MG: 20 TABLET ORAL at 20:31

## 2018-03-18 RX ADMIN — FAMOTIDINE 20 MG: 10 INJECTION INTRAVENOUS at 20:34

## 2018-03-18 RX ADMIN — DIVALPROEX SODIUM 1000 MG: 500 TABLET, DELAYED RELEASE ORAL at 20:31

## 2018-03-18 RX ADMIN — GUAIFENESIN 1200 MG: 600 TABLET, EXTENDED RELEASE ORAL at 20:32
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
## 2018-03-19 ENCOUNTER — APPOINTMENT (OUTPATIENT)
Dept: CARDIOLOGY | Facility: HOSPITAL | Age: 68
End: 2018-03-19
Attending: INTERNAL MEDICINE

## 2018-03-19 LAB
ANION GAP SERPL CALCULATED.3IONS-SCNC: 9 MMOL/L (ref 4–13)
ARTERIAL PATENCY WRIST A: ABNORMAL
ATMOSPHERIC PRESS: 744 MMHG
BASE EXCESS BLDA CALC-SCNC: 11.8 MMOL/L (ref 0–2)
BDY SITE: ABNORMAL
BODY TEMPERATURE: 37 C
BUN BLD-MCNC: 16 MG/DL (ref 5–21)
BUN/CREAT SERPL: 20 (ref 7–25)
CALCIUM SPEC-SCNC: 8.2 MG/DL (ref 8.4–10.4)
CHLORIDE SERPL-SCNC: 97 MMOL/L (ref 98–110)
CO2 SERPL-SCNC: 39 MMOL/L (ref 24–31)
CREAT BLD-MCNC: 0.8 MG/DL (ref 0.5–1.4)
GAS FLOW AIRWAY: 3 LPM
GFR SERPL CREATININE-BSD FRML MDRD: 96 ML/MIN/1.73
GLUCOSE BLD-MCNC: 169 MG/DL (ref 70–100)
HBA1C MFR BLD: 5.6 %
HCO3 BLDA-SCNC: 39.3 MMOL/L (ref 20–26)
Lab: ABNORMAL
Lab: ABNORMAL
MODALITY: ABNORMAL
NOTIFIED BY: ABNORMAL
NOTIFIED WHO: ABNORMAL
PCO2 BLDA: 71.2 MM HG (ref 35–45)
PH BLDA: 7.35 PH UNITS (ref 7.35–7.45)
PO2 BLDA: 75.6 MM HG (ref 83–108)
POTASSIUM BLD-SCNC: 3.7 MMOL/L (ref 3.5–5.3)
SAO2 % BLDCOA: 94.2 % (ref 94–99)
SODIUM BLD-SCNC: 145 MMOL/L (ref 135–145)
TROPONIN I SERPL-MCNC: 0.03 NG/ML (ref 0–0.03)
TROPONIN I SERPL-MCNC: 0.03 NG/ML (ref 0–0.03)
VENTILATOR MODE: ABNORMAL

## 2018-03-19 PROCEDURE — 25010000002 PIPERACILLIN SOD-TAZOBACTAM PER 1 G: Performed by: INTERNAL MEDICINE

## 2018-03-19 PROCEDURE — 84484 ASSAY OF TROPONIN QUANT: CPT | Performed by: INTERNAL MEDICINE

## 2018-03-19 PROCEDURE — 80048 BASIC METABOLIC PNL TOTAL CA: CPT | Performed by: INTERNAL MEDICINE

## 2018-03-19 PROCEDURE — 93306 TTE W/DOPPLER COMPLETE: CPT | Performed by: INTERNAL MEDICINE

## 2018-03-19 PROCEDURE — 36600 WITHDRAWAL OF ARTERIAL BLOOD: CPT

## 2018-03-19 PROCEDURE — 94799 UNLISTED PULMONARY SVC/PX: CPT

## 2018-03-19 PROCEDURE — 97162 PT EVAL MOD COMPLEX 30 MIN: CPT

## 2018-03-19 PROCEDURE — G8982 BODY POS GOAL STATUS: HCPCS

## 2018-03-19 PROCEDURE — 82803 BLOOD GASES ANY COMBINATION: CPT

## 2018-03-19 PROCEDURE — 83036 HEMOGLOBIN GLYCOSYLATED A1C: CPT | Performed by: FAMILY MEDICINE

## 2018-03-19 PROCEDURE — 25010000002 VANCOMYCIN PER 500 MG: Performed by: INTERNAL MEDICINE

## 2018-03-19 PROCEDURE — 93306 TTE W/DOPPLER COMPLETE: CPT

## 2018-03-19 PROCEDURE — G8981 BODY POS CURRENT STATUS: HCPCS

## 2018-03-19 PROCEDURE — 94640 AIRWAY INHALATION TREATMENT: CPT

## 2018-03-19 RX ORDER — BUDESONIDE AND FORMOTEROL FUMARATE DIHYDRATE 160; 4.5 UG/1; UG/1
2 AEROSOL RESPIRATORY (INHALATION)
COMMUNITY

## 2018-03-19 RX ORDER — BUDESONIDE AND FORMOTEROL FUMARATE DIHYDRATE 160; 4.5 UG/1; UG/1
2 AEROSOL RESPIRATORY (INHALATION)
Status: DISCONTINUED | OUTPATIENT
Start: 2018-03-19 | End: 2018-03-25 | Stop reason: HOSPADM

## 2018-03-19 RX ORDER — TRAZODONE HYDROCHLORIDE 50 MG/1
50 TABLET ORAL NIGHTLY PRN
COMMUNITY
End: 2018-03-25 | Stop reason: HOSPADM

## 2018-03-19 RX ORDER — SOTALOL HYDROCHLORIDE 80 MG/1
80 TABLET ORAL 2 TIMES DAILY
COMMUNITY

## 2018-03-19 RX ORDER — BUSPIRONE HYDROCHLORIDE 10 MG/1
10 TABLET ORAL 3 TIMES DAILY
COMMUNITY

## 2018-03-19 RX ORDER — SOTALOL HYDROCHLORIDE 80 MG/1
80 TABLET ORAL EVERY 12 HOURS SCHEDULED
Status: DISCONTINUED | OUTPATIENT
Start: 2018-03-19 | End: 2018-03-25 | Stop reason: HOSPADM

## 2018-03-19 RX ORDER — ALBUTEROL SULFATE 90 UG/1
2 AEROSOL, METERED RESPIRATORY (INHALATION) EVERY 4 HOURS PRN
COMMUNITY

## 2018-03-19 RX ORDER — BUSPIRONE HYDROCHLORIDE 10 MG/1
10 TABLET ORAL 3 TIMES DAILY
Status: DISCONTINUED | OUTPATIENT
Start: 2018-03-19 | End: 2018-03-25 | Stop reason: HOSPADM

## 2018-03-19 RX ORDER — METOPROLOL TARTRATE 100 MG/1
100 TABLET ORAL EVERY 12 HOURS SCHEDULED
Status: DISCONTINUED | OUTPATIENT
Start: 2018-03-19 | End: 2018-03-25 | Stop reason: HOSPADM

## 2018-03-19 RX ORDER — TRAZODONE HYDROCHLORIDE 50 MG/1
50 TABLET ORAL NIGHTLY PRN
Status: DISCONTINUED | OUTPATIENT
Start: 2018-03-19 | End: 2018-03-20

## 2018-03-19 RX ORDER — METOPROLOL TARTRATE 100 MG/1
100 TABLET ORAL 2 TIMES DAILY
COMMUNITY

## 2018-03-19 RX ADMIN — ALLOPURINOL 100 MG: 100 TABLET ORAL at 14:56

## 2018-03-19 RX ADMIN — TAZOBACTAM SODIUM AND PIPERACILLIN SODIUM 3.38 G: 375; 3 INJECTION, SOLUTION INTRAVENOUS at 04:02

## 2018-03-19 RX ADMIN — SOTALOL HYDROCHLORIDE 80 MG: 80 TABLET ORAL at 20:48

## 2018-03-19 RX ADMIN — VANCOMYCIN HYDROCHLORIDE 1000 MG: 1 INJECTION, SOLUTION INTRAVENOUS at 20:47

## 2018-03-19 RX ADMIN — METOPROLOL TARTRATE 100 MG: 100 TABLET, FILM COATED ORAL at 20:48

## 2018-03-19 RX ADMIN — TAZOBACTAM SODIUM AND PIPERACILLIN SODIUM 3.38 G: 375; 3 INJECTION, SOLUTION INTRAVENOUS at 13:11

## 2018-03-19 RX ADMIN — BUSPIRONE HYDROCHLORIDE 10 MG: 10 TABLET ORAL at 20:47

## 2018-03-19 RX ADMIN — GUAIFENESIN 1200 MG: 600 TABLET, EXTENDED RELEASE ORAL at 20:47

## 2018-03-19 RX ADMIN — BUSPIRONE HYDROCHLORIDE 10 MG: 10 TABLET ORAL at 17:33

## 2018-03-19 RX ADMIN — TAZOBACTAM SODIUM AND PIPERACILLIN SODIUM 3.38 G: 375; 3 INJECTION, SOLUTION INTRAVENOUS at 19:45

## 2018-03-19 RX ADMIN — VANCOMYCIN HYDROCHLORIDE 1000 MG: 1 INJECTION, SOLUTION INTRAVENOUS at 11:39

## 2018-03-19 RX ADMIN — DABIGATRAN ETEXILATE MESYLATE 150 MG: 150 CAPSULE ORAL at 14:56

## 2018-03-19 RX ADMIN — BUDESONIDE AND FORMOTEROL FUMARATE DIHYDRATE 2 PUFF: 160; 4.5 AEROSOL RESPIRATORY (INHALATION) at 20:40

## 2018-03-19 RX ADMIN — FAMOTIDINE 20 MG: 10 INJECTION INTRAVENOUS at 11:49

## 2018-03-19 RX ADMIN — DIVALPROEX SODIUM 1000 MG: 500 TABLET, DELAYED RELEASE ORAL at 14:55

## 2018-03-19 RX ADMIN — DESMOPRESSIN ACETATE 20 MG: 0.2 TABLET ORAL at 20:48

## 2018-03-19 RX ADMIN — DABIGATRAN ETEXILATE MESYLATE 150 MG: 150 CAPSULE ORAL at 20:48

## 2018-03-19 RX ADMIN — FAMOTIDINE 20 MG: 10 INJECTION INTRAVENOUS at 20:47

## 2018-03-19 RX ADMIN — DILTIAZEM HYDROCHLORIDE 240 MG: 240 CAPSULE, EXTENDED RELEASE ORAL at 14:56

## 2018-03-19 RX ADMIN — ASPIRIN 81 MG: 81 TABLET ORAL at 14:56

## 2018-03-19 NOTE — CONSULTS
"Pharmacy Dosing Service  Pharmacokinetics  Vancomycin Initial Evaluation    Assessment/Action/Plan:  Initiated Vancomycin 1,000 mg IVPB every 12 hours, following a 1,000 mg dose entered while awaiting ht/wt and SCr. Vancomycin trough ordered prior to 4th dose on 03/20/18 before 0900 dose. Pharmacy will monitor renal function and adjust dose accordingly.     Subjective:  Froy Hoyos is a 67 y.o. male with a Vancomycin \"Pharmacy to Dose\" consult for the treatment of Pneumonia (x 7 days) .    Complicating factors:  CHF  Obesity  Loop diuretic therapy  Concomitant Zosyn therapy    Objective:  Ht: 172.7 cm (68\"); Wt: 92.1 kg (203 lb 1.6 oz)  Estimated Creatinine Clearance: 82.6 mL/min (by C-G formula based on SCr of 0.93 mg/dL).   Lab Results   Component Value Date    CREATININE 0.93 03/18/2018        Baseline culture results:  Microbiology Results (last 10 days)       ** No results found for the last 240 hours. **            Silver Monsalve, [facility]  03/18/18 10:48 PM    "

## 2018-03-19 NOTE — PLAN OF CARE
Problem: Patient Care Overview  Goal: Plan of Care Review  Outcome: Ongoing (interventions implemented as appropriate)  Pt [facility] with [facility] fibrillation and pneumonia.  HR controlled at 96 upon arrival, pt had been given bolus and gtt of cardizem at [facility].   Pt on bipap upon arrival.  Pt alert and oriented to person, place, time, disoriented to situation.  IV to right AC.  No family at bedside at this time.   Dr Coon here to see pt.   Goal: Individualization and Mutuality  Outcome: Ongoing (interventions implemented as appropriate)    Goal: Discharge Needs Assessment  Outcome: Ongoing (interventions implemented as appropriate)    Goal: Interprofessional Rounds/Family Conf  Outcome: Ongoing (interventions implemented as appropriate)      Problem: Fall Risk (Adult)  Goal: Identify Related Risk Factors and Signs and Symptoms  Outcome: Ongoing (interventions implemented as appropriate)    Goal: Absence of Fall  Outcome: Ongoing (interventions implemented as appropriate)      Problem: Skin Injury Risk (Adult)  Goal: Identify Related Risk Factors and Signs and Symptoms  Outcome: Ongoing (interventions implemented as appropriate)    Goal: Skin Health and Integrity  Outcome: Ongoing (interventions implemented as appropriate)      Problem: Pneumonia (Adult)  Goal: Signs and Symptoms of Listed Potential Problems Will be Absent, Minimized or Managed (Pneumonia)  Outcome: Ongoing (interventions implemented as appropriate)      Problem: Arrhythmia/Dysrhythmia (Symptomatic) (Adult)  Goal: Signs and Symptoms of Listed Potential Problems Will be Absent, Minimized or Managed (Arrhythmia/Dysrhythmia)  Outcome: Ongoing (interventions implemented as appropriate)

## 2018-03-19 NOTE — PLAN OF CARE
Problem: Patient Care Overview  Goal: Plan of Care Review  Outcome: Ongoing (interventions implemented as appropriate)   03/19/18 045   Coping/Psychosocial   Plan of Care Reviewed With patient   Plan of Care Review   Progress improving   OTHER   Outcome Summary Pt has been off cardizem drip all night. Switched to PO cardizem. Pt has been in rate controlled afib all night. All other vital signs stable. Pt has been sleeping most of the night, no complaints of pain or any other issues.        Problem: Fall Risk (Adult)  Goal: Identify Related Risk Factors and Signs and Symptoms  Outcome: Outcome(s) achieved Date Met: 03/19/18    Goal: Absence of Fall  Outcome: Ongoing (interventions implemented as appropriate)      Problem: Skin Injury Risk (Adult)  Goal: Identify Related Risk Factors and Signs and Symptoms  Outcome: Outcome(s) achieved Date Met: 03/19/18    Goal: Skin Health and Integrity  Outcome: Outcome(s) achieved Date Met: 03/19/18      Problem: Pneumonia (Adult)  Goal: Signs and Symptoms of Listed Potential Problems Will be Absent, Minimized or Managed (Pneumonia)  Outcome: Ongoing (interventions implemented as appropriate)      Problem: Arrhythmia/Dysrhythmia (Symptomatic) (Adult)  Goal: Signs and Symptoms of Listed Potential Problems Will be Absent, Minimized or Managed (Arrhythmia/Dysrhythmia)  Outcome: Ongoing (interventions implemented as appropriate)

## 2018-03-19 NOTE — PROGRESS NOTES
Continued Stay Note   [facility]     Patient Name: Froy Hoyos  MRN: [id number]  Today's Date: 3/19/2018    Admit Date: 3/18/2018          Discharge Plan     Row Name 03/19/18 1117       Plan    Plan Attempted to screen patient and he was sleeping.  Nursing advises patient has been lethargic and confused.  [facility] is [facility].  Unable to reach family at this time.              Discharge Codes    No documentation.           COLBY Goetz

## 2018-03-19 NOTE — PLAN OF CARE
Problem: Patient Care Overview  Goal: Plan of Care Review  Outcome: Ongoing (interventions implemented as appropriate)   03/19/18 2460   Coping/Psychosocial   Plan of Care Reviewed With patient   Plan of Care Review   Progress improving   OTHER   Outcome Summary Was lethargic until around 1400 today, ate well, took his meds, afebrile, afib controlled rate, lots of incontinent urine output, did not complain of pain.

## 2018-03-19 NOTE — THERAPY EVALUATION
Acute Care - Physical Therapy Initial Evaluation  [facility]     Patient Name: Froy Hoyos  : 1950  MRN: [id number]  Today's Date: 3/19/2018   Onset of Illness/Injury or Date of Surgery: 18  Date of Referral to PT: 18  Referring Physician: Dr. Morton      Admit Date: 3/18/2018    Visit Dx:     ICD-10-CM ICD-9-CM   1. Impaired mobility Z74.09 799.89     Patient Active Problem List   Diagnosis   • Chronic congestive heart failure   • HCAP (healthcare-associated pneumonia)   • Atrial fibrillation with RVR     Past Medical History:   Diagnosis Date   • Atrial fibrillation    • CHF (congestive heart failure)    • Hypertension    • Stroke      History reviewed. No pertinent surgical history.     PT ASSESSMENT (last 72 hours)      Physical Therapy Evaluation     Row Name 18 1315 18 1005       PT Evaluation Time/Intention    Subjective Information fatigue  -MARGY (r) CS (t) MARGY (c)  --    Document Type evaluation  -MARGY (r) CS (t) MARGY (c) --  -MARGY    Mode of Treatment physical therapy  -MARGY (r) CS (t) MARGY (c) --  -MARGY    Row Name 18 1315 18 1005       General Information    Patient Profile Reviewed? yes  -MARGY (r) CS (t) MARGY (c) --  -MARGY    Onset of Illness/Injury or Date of Surgery 18  -MARGY (r) CS (t) MARGY (c) --  -MARGY    Referring Physician Dr. Morton  -MARGY (r) CS (t) MARGY (c) --  -MARGY    Patient Observations cooperative;agree to therapy;lethargic  -MARGY (r) CS (t) MARGY (c)  --    Prior Level of Function independent:;gait;grooming;dressing;bathing;feeding  -MARGY (r) CS (t) MARGY (c)  --    Equipment Currently Used at Home cane, quad;wheelchair;rollator  -MARGY (r) CS (t) MARGY (c)  --    Pertinent History of Current Functional Problem 3/18/2018 pt transferred from [place] with A-fib with RVR and bilateral pneumonia. Benzo positive and pCO2 71.2 last check; PMH: Afib, CHF, HTN, stroke, substance abuse (meth)  -MARGY (r) CS (t) MARGY (c) --  -MARGY    Existing Precautions/Restrictions fall  -MARGY  --    Risks Reviewed  patient:;nausea/vomiting;dizziness;increased discomfort;change in vital signs;LOB  -MARGY  --    Benefits Reviewed patient:;improve function;increase independence;increase strength;increase balance  -MARGY (r) CS (t) MARGY (c)  --    Barriers to Rehab --   level of conciousness/lethargic  -MARGY (r) CS (t) MARGY (c)  --    Row Name 03/19/18 1315          Relationship/Environment    Lives With child(kaci), adult   son  -MARGY (r) CS (t) MARGY (c)     Family Caregiver if Needed child(kaci), adult  -MARGY (r) CS (t) MARGY (c)     Row Name 03/19/18 1315          Resource/Environmental Concerns    Current Living Arrangements home/apartment/condo  -MARGY (r) CS (t) MARGY (c)     Row Name 03/19/18 1315          Cognitive Assessment/Interventions    Additional Documentation Cognitive Assessment/Intervention (Group)  -MARGY (r) CS (t) MARGY (c)     Row Name 03/19/18 1315          Cognitive Assessment/Intervention- PT/OT    Affect/Mental Status (Cognitive) low arousal/lethargic  -MARGY (r) CS (t) MARGY (c)     Orientation Status (Cognition) oriented x 3;disoriented to;situation   orientation deteriorated towards end of session  -MARGY (r) CS (t) MARGY (c)     Follows Commands (Cognition) 25-49% accuracy;follows one step commands  -MARGY (r) CS (t) MARGY (c)     Safety Deficit (Cognitive) moderate deficit;awareness of need for assistance;insight into deficits/self awareness  -MARGY (r) CS (t) MARGY (c)     Personal Safety Interventions fall prevention program maintained;gait belt;muscle strengthening facilitated;nonskid shoes/slippers when out of bed  -MARGY (r) CS (t) MARGY (c)     Row Name 03/19/18 1315          Safety Issues, Functional Mobility    Safety Issues Affecting Function (Mobility) awareness of need for assistance;insight into deficits/self awareness  -AMRGY (r) CS (t) MARGY (c)     Impairments Affecting Function (Mobility) balance;endurance/activity tolerance;motor control;postural/trunk control;strength  -MARGY (r) CS (t) MARGY (c)     Row Name 03/19/18 1315          Bed Mobility  Assessment/Treatment    Bed Mobility Assessment/Treatment rolling left;supine-sit;sit-supine  -MARGY (r) CS (t) MARGY (c)     Rolling Left [place] (Bed Mobility) maximum assist (25% patient effort)  -MARGY (r) CS (t) MARGY (c)     Supine-Sit [place] (Bed Mobility) maximum assist (25% patient effort)  -MARGY (r) CS (t) MARGY (c)     Sit-Supine [place] (Bed Mobility) moderate assist (50% patient effort)   B LE  -MARGY (r) CS (t) MARGY (c)     Bed Mobility, Safety Issues decreased use of arms for pushing/pulling;decreased use of legs for bridging/pushing;impaired trunk control for bed mobility  -MARGY (r) CS (t) MARGY (c)     Assistive Device (Bed Mobility) bed rails;head of bed elevated   HHA x1  -MARGY (r) CS (t) MARGY (c)     Row Name 03/19/18 1315          General ROM    GENERAL ROM COMMENTS B LE/UE AROM ~ 25% impaired L>R   residual weakness from stroke  -MARGY (r) CS (t) MARGY (c)     Row Name 03/19/18 1315          General Assessment (Manual Muscle Testing)    Comment, General Manual Muscle Testing (MMT) Assessment Unable to formally assess. B LE functionally able to assist with supine to sit. B UE able to reach face.    L weakness > R d/t residual stroke weakness  -MARGY (r) CS (t) MARGY (c)     Row Name 03/19/18 1315          Motor Assessment/Intervention    Additional Documentation Balance (Group)  -MARGY (r) CS (t) MARGY (c)     Row Name 03/19/18 1315          Balance    Balance static sitting balance  -MARGY (r) CS (t) MARGY (c)     Row Name 03/19/18 1315          Static Sitting Balance    Level of [place] (Unsupported Sitting, Static Balance) moderate assist, 50 to 74% patient effort  -MARGY (r) CS (t) MARGY (c)     Sitting Position (Unsupported Sitting, Static Balance) sitting on edge of bed  -MARGY (r) CS (t) MARGY (c)     Time Able to Maintain Position (Unsupported Sitting, Static Balance) 4 to 5 minutes  -MARGY (r) CS (t) MARGY (c)     Comment (Unsupported Sitting, Static Balance) patient leaned hard L, mod assist and vcs to remain upright  -MARGY (r) CS (t) MARGY  (c)     Row Name 03/19/18 1315          Vision Assessment/Intervention    Visual Impairment/Limitations unable/difficult to assess  -MARGY (r) CS (t) MARGY (c)     Row Name 03/19/18 1315          Pain Assessment    Additional Documentation Pain Scale: Numbers Pre/Post-Treatment (Group)  -MARGY (r) CS (t) MARGY (c)     Row Name 03/19/18 1315          Pain Scale: Numbers Pre/Post-Treatment    Pain Scale: Numbers, Pretreatment 0/10 - no pain  -MARGY (r) CS (t) MARGY (c)     Pain Scale: Numbers, Post-Treatment 0/10 - no pain  -MARGY (r) CS (t) MARGY (c)     Row Name 03/19/18 1315          Health Promotion    Additional Documentation Coping (Group);Plan of Care Review (Group)  -MARGY (r) CS (t) MARGY (c)     Row Name 03/19/18 1315          Coping    Observed Emotional State cooperative;flat  -MARGY (r) CS (t) MARGY (c)     Row Name 03/19/18 1315          Plan of Care Review    Plan of Care Reviewed With patient  -MARGY (r) CS (t) MARGY (c)     Row Name 03/19/18 1315 03/19/18 1005       Physical Therapy Clinical Impression    Date of Referral to PT 03/18/18  -MARGY (r) CS (t) MARGY (c) --  -MARGY    PT Diagnosis (PT Clinical Impression) impaired mobility  -MARGY (r) CS (t) MARGY (c)  --    Patient/Family Goals Statement (PT Clinical Impression) PT goal: increase bed mobility to min A   -MARGY (r) CS (t) MARGY (c)  --    Criteria for Skilled Interventions Met (PT Clinical Impression) yes;treatment indicated  -MARGY (r) CS (t) MARGY (c)  --    Pathology/Pathophysiology Noted (Describe Specifically for Each System) musculoskeletal;neuromuscular;cardiovascular  -MARGY (r) CS (t) MARGY (c)  --    Impairments Found (describe specific impairments) aerobic capacity/endurance;arousal, attention, and cognition;gait, locomotion, and balance;motor function  -MARGY (r) CS (t) MARGY (c)  --    Functional Limitations in Following Categories (Describe Specific Limitations) self-care;home management  -MARGY (r) CS (t) MARGY (c)  --    Rehab Potential (PT Clinical Summary) fair, will monitor progress closely  -MARGY (r) CS (t)  MARGY (c)  --    Predicted Duration of Therapy (PT) until d/c  -MARGY (r) CS (t) MARGY (c)  --    Care Plan Review (PT) evaluation/treatment results reviewed;care plan/treatment goals reviewed  -MARGY (r) CS (t) MARGY (c)  --    Row Name 03/19/18 0045          Physical Therapy Goals    Bed Mobility Goal Selection (PT) bed mobility, PT goal 1  -MARGY (r) CS (t) MARGY (c)     Transfer Goal Selection (PT) transfer, PT goal 1  -MARGY (r) CS (t) MARGY (c)     Gait Training Goal Selection (PT) gait training, PT goal 1  -MARGY (r) CS (t) MARGY (c)     Row Name 03/19/18 0515          Bed Mobility Goal 1 (PT)    Activity/Assistive Device (Bed Mobility Goal 1, PT) rolling to left;rolling to right;supine to sit  -MARGY (r) CS (t) MARGY (c)     [place] Level/Cues Needed (Bed Mobility Goal 1, PT) minimum assist (75% or more patient effort)  -MARGY     Time Frame (Bed Mobility Goal 1, PT) long term goal (LTG);10 days  -MARGY (r) CS (t) MARGY (c)     Barriers (Bed Mobility Goal 1, PT) LOC  -MARGY (r) CS (t) MARGY (c)     Progress/Outcomes (Bed Mobility Goal 1, PT) goal ongoing  -MARGY (r) CS (t) MARGY (c)     Row Name 03/19/18 0127          Transfer Goal 1 (PT)    Activity/Assistive Device (Transfer Goal 1, PT) sit-to-stand/stand-to-sit;bed-to-chair/chair-to-bed  -MARGY (r) CS (t) MARGY (c)     [place] Level/Cues Needed (Transfer Goal 1, PT) moderate assist (50-74% patient effort)   x1-2  -MARGY (r) CS (t) MARGY (c)     Time Frame (Transfer Goal 1, PT) long term goal (LTG);10 days  -MARGY (r) CS (t) MARGY (c)     Barriers (Transfers Goal 1, PT) LOC  -MARGY (r) CS (t) MARGY (c)     Progress/Outcome (Transfer Goal 1, PT) goal ongoing  -MARGY (r) CS (t) MARGY (c)     Row Name 03/19/18 8701          Gait Training Goal 1 (PT)    Activity/Assistive Device (Gait Training Goal 1, PT) gait (walking locomotion);assistive device use  -MARGY (r) CS (t) MARGY (c)     [place] Level (Gait Training Goal 1, PT) moderate assist (50-74% patient effort)   x2  -MARGY (r) CS (t) MARGY (c)     Distance (Gait Goal 1, PT) 10ft  -MARGY (r) CS  (t) MARGY (c)     Time Frame (Gait Training Goal 1, PT) long term goal (LTG);10 days  -MARGY (r) CS (t) MARGY (c)     Barriers (Gait Training Goal 1, PT) LOC  -MARGY (r) CS (t) MARGY (c)     Progress/Outcome (Gait Training Goal 1, PT) goal ongoing  -MARGY (r) CS (t) MARGY (c)     Row Name 03/19/18 1315          Positioning and Restraints    Pre-Treatment Position in bed  -MARGY (r) CS (t) MARGY (c)     Post Treatment Position bed  -MARGY (r) CS (t) MARGY (c)     In Bed fowlers;call light within reach;encouraged to call for assist;notified nsg  -MARGY     Row Name 03/19/18 1315          Living Environment    Home Accessibility other (see comments)   2 stairs in home  -MARGY (r) CS (t) MARGY (c)       User Key  (r) = Recorded By, (t) = Taken By, (c) = Cosigned By    Initials Name Provider Type    MARGY Coronado, PT DPT Physical Therapist     Panfilo Joseph, PT Student PT Student          Physical Therapy Education     Title: PT OT SLP Therapies (Active)     Topic: Physical Therapy (Active)     Point: Mobility training (Active)    Learning Progress Summary     Learner Status Readiness Method Response Comment Documented by    Patient Active Acceptance E NR pt educated on progression of POC until d/c  03/19/18 1423                      User Key     Initials Effective Dates Name Provider Type Discipline     01/08/18 -  Panfilo Joseph, PT Student PT Student PT                PT Recommendation and Plan  Anticipated Discharge Disposition (PT): skilled nursing facility (SNF)  Planned Therapy Interventions (PT Eval): balance training, bed mobility training, gait training, home exercise program, motor coordination training, patient/family education, strengthening, ROM (range of motion), transfer training  Therapy Frequency (PT Clinical Impression): daily  Outcome Summary/Treatment Plan (PT)  Anticipated Discharge Disposition (PT): skilled nursing facility (SNF)  Plan of Care Reviewed With: patient  Outcome Summary: PT eval completed. pt presents with  impaired bed mobilty, decreased strength and AROM in B LE/UE. pt has hx of stroke with L sided residual symptoms. Max A supine<>sit on EOB and Mod A EOB<>supine. Unable to maintain upright when seated , leans to the L. pt would benefit from skilled PT at this time to address impaired bed mobility and weakness. PT recommends d/c to SNF           Outcome Measures     Row Name 03/19/18 1315             How much help from another person do you currently need...    Turning from your back to your side while in flat bed without using bedrails? 2  -MARGY (r) CS (t) MARGY (c)      Moving from lying on back to sitting on the side of a flat bed without bedrails? 2  -MARGY (r) CS (t) MARGY (c)      Moving to and from a bed to a chair (including a wheelchair)? 2  -MARGY (r) CS (t) MARGY (c)      Standing up from a chair using your arms (e.g., wheelchair, bedside chair)? 2  -MARGY (r) CS (t) MARGY (c)      Climbing 3-5 steps with a railing? 1  -MARGY (r) CS (t) MARGY (c)      To walk in hospital room? 2  -MARGY (r) CS (t) MARGY (c)      AM-PAC 6 Clicks Score 11  -MARGY (r) CS (t)         Functional Assessment    Outcome Measure Options AM-PAC 6 Clicks Basic Mobility (PT)  -MARGY (r) CS (t) MARGY (c)        User Key  (r) = Recorded By, (t) = Taken By, (c) = Cosigned By    Initials Name Provider Type    MARGY Coronado, PT DPT Physical Therapist    DEEPA Joseph, PT Student PT Student           Time Calculation:         PT Charges     Row Name 03/19/18 9972             Time Calculation    Start Time 1315  -MARGY (r) CS (t) MARGY (c)      Stop Time 1355   chart review [id number]. PT with 58 total minutes  -MARGY (r) CS (t) MARGY (c)      Time Calculation (min) 40 min  -MARGY (r) CS (t)      PT Received On 03/19/18  -MARGY (r) CS (t) MARGY (c)      PT Goal Re-Cert Due Date 03/29/18  -MARGY (r) CS (t) MARGY (c)        User Key  (r) = Recorded By, (t) = Taken By, (c) = Cosigned By    Initials Name Provider Type    MARGY Coronado, PT DPT Physical Therapist    DEEPA Joseph, PT  Student PT Student          Therapy Charges for Today     Code Description Service Date Service Provider Modifiers Qty    [id number] HC PT EVAL MOD COMPLEXITY 4 3/19/2018 Panfilo Joseph, PT Student GP 1          PT G-Codes  Outcome Measure Options: AM-PAC 6 Clicks Basic Mobility (PT)  Score: 11  Functional Limitation: Changing and maintaining body position  Changing and Maintaining Body Position Current Status (): At least 60 percent but less than 80 percent impaired, limited or restricted  Changing and Maintaining Body Position Goal Status (): At least 40 percent but less than 60 percent impaired, limited or restricted      Panfilo Joseph, PT Student  3/19/2018

## 2018-03-19 NOTE — PLAN OF CARE
Problem: Patient Care Overview  Goal: Plan of Care Review  Outcome: Ongoing (interventions implemented as appropriate)   03/19/18 3745   Coping/Psychosocial   Plan of Care Reviewed With patient   OTHER   Outcome Summary PT eval completed. pt presents with impaired bed mobilty, decreased strength and AROM in B LE/UE. pt has hx of stroke with L sided residual symptoms. Max A supine<>sit on EOB and Mod A EOB<>supine. Unable to maintain upright when seated , leans to the L. pt would benefit from skilled PT at this time to address impaired bed mobility and weakness. PT recommends d/c to SNF

## 2018-03-19 NOTE — PROGRESS NOTES
[facility]  INPATIENT PROGRESS NOTE    Length of Stay: 1  Date of Admission: 3/18/2018  Primary Care Physician: No Known Provider    Subjective   Chief Complaint: none today, f/u afib/rvr  HPI      transfer from [place] for bilateral pneumonia and A. fib with RVR.  Nursing staff who has him today states she saw him on arrival yesterday evening and he reportedly was talking awake and alert.  She states this morning however he is less responsive and minimally interactive.  He has had stable vital signs.  An ABG was checked and was within normal range for him.  He does wear home oxygen and is on his home O2 amount.  He does wake up with fairly significant prompting.  He denies having any current pain or needs.  He is however very sleepy.  He has not required diltiazem drip and is taking oral medications.  Patient does take Seroquel and Depakote at night however did not receive any new medications or other sedating-type medications.      Review of Systems     All pertinent negatives and positives are as above. All other systems have been reviewed and are negative unless otherwise stated.     Objective    Temp:  [98 °F (36.7 °C)] 98 °F (36.7 °C)  Heart Rate:  [] 72  Resp:  [18-20] 20  BP: ()/(56-98) 109/65  FiO2 (%):  [35 %] 35 %  Physical Exam   Constitutional: He appears well-developed and well-nourished.   HENT:   Head: Normocephalic and atraumatic.   Eyes: Conjunctivae and EOM are normal. Pupils are equal, round, and reactive to light.   Neck: Neck supple. No JVD present. No thyromegaly present.   Cardiovascular: Normal rate, regular rhythm, normal heart sounds and intact distal pulses.  Exam reveals no gallop and no friction rub.    No murmur heard.  Pulmonary/Chest: Effort normal and breath sounds normal. No respiratory distress. He has no wheezes. He has no rales. He exhibits no tenderness.   Abdominal: Soft. Bowel sounds are normal. He  exhibits no distension. There is no tenderness. There is no rebound and no guarding.   Musculoskeletal: Normal range of motion. He exhibits no edema, tenderness or deformity.   Lymphadenopathy:     He has no cervical adenopathy.   Neurological: He displays normal reflexes. No cranial nerve deficit. He exhibits normal muscle tone.   Sleepy but arousable.  Minimally interactive   Skin: Skin is warm and dry. No rash noted.           Results Review:  I have reviewed the labs, radiology results, and diagnostic studies.    Laboratory Data:            Results from last 7 days  Lab Units 03/19/18  0011 03/18/18  1903   SODIUM mmol/L 145 145   POTASSIUM mmol/L 3.7 3.9   CHLORIDE mmol/L 97* 97*   CO2 mmol/L 39.0* >40.0*   BUN mg/dL 16 20   CREATININE mg/dL 0.80 0.93   CALCIUM mg/dL 8.2* 8.2*   GLUCOSE mg/dL 169* 96       Culture Data:        Radiology Data:   Imaging Results (last 24 hours)     ** No results found for the last 24 hours. **          I have reviewed the patient current medications.     Assessment/Plan     Hospital Problem List     Atrial fibrillation with RVR          1. Atrial fib/RVR-HR improved  2. Hx of CHF echo pending, elevated bnp  3. Substance abuse  4. Bilateral PNA on day 2 vanc/zosyn  5. Somewhat lethargic state    Plan  1. Continue oral meds  2. Hold night seroquel if remains this sleepy  3. Continue abx for now  4. Change to oral abx soon if he improves  5. Unsure if this is a toxic metabolic encephalopathy type picture due to infection, night seroquel, or related to his home use of meth and not not using here. Will monitor in the ICU today unless improves over the day              Discharge Planning: I expect the patient to be discharged to home in 2-4 days.    Delmy Vieyra MD   03/19/18   8:50 AM

## 2018-03-20 ENCOUNTER — APPOINTMENT (OUTPATIENT)
Dept: GENERAL RADIOLOGY | Facility: HOSPITAL | Age: 68
End: 2018-03-20

## 2018-03-20 LAB
ANION GAP SERPL CALCULATED.3IONS-SCNC: ABNORMAL MMOL/L (ref 4–13)
ARTERIAL PATENCY WRIST A: POSITIVE
ATMOSPHERIC PRESS: 748 MMHG
BASE EXCESS BLDA CALC-SCNC: 15.8 MMOL/L (ref 0–2)
BASOPHILS # BLD AUTO: 0.03 10*3/MM3 (ref 0–0.2)
BASOPHILS NFR BLD AUTO: 0.4 % (ref 0–2)
BDY SITE: ABNORMAL
BH CV ECHO MEAS - AO MAX PG (FULL): 3.1 MMHG
BH CV ECHO MEAS - AO MAX PG: 6 MMHG
BH CV ECHO MEAS - AO MEAN PG (FULL): 2 MMHG
BH CV ECHO MEAS - AO MEAN PG: 3 MMHG
BH CV ECHO MEAS - AO ROOT AREA (BSA CORRECTED): 1.8
BH CV ECHO MEAS - AO ROOT AREA: 10.8 CM^2
BH CV ECHO MEAS - AO ROOT DIAM: 3.7 CM
BH CV ECHO MEAS - AO V2 MAX: 122 CM/SEC
BH CV ECHO MEAS - AO V2 MEAN: 76.4 CM/SEC
BH CV ECHO MEAS - AO V2 VTI: 22.2 CM
BH CV ECHO MEAS - AVA(I,A): 2.4 CM^2
BH CV ECHO MEAS - AVA(I,D): 2.4 CM^2
BH CV ECHO MEAS - AVA(V,A): 2.2 CM^2
BH CV ECHO MEAS - AVA(V,D): 2.2 CM^2
BH CV ECHO MEAS - BSA(HAYCOCK): 2.1 M^2
BH CV ECHO MEAS - BSA: 2.1 M^2
BH CV ECHO MEAS - BZI_BMI: 31 KILOGRAMS/M^2
BH CV ECHO MEAS - BZI_METRIC_HEIGHT: 172.7 CM
BH CV ECHO MEAS - BZI_METRIC_WEIGHT: 92.5 KG
BH CV ECHO MEAS - CONTRAST EF 4CH: 50.1 ML/M^2
BH CV ECHO MEAS - EDV(CUBED): 191.1 ML
BH CV ECHO MEAS - EDV(MOD-SP4): 146 ML
BH CV ECHO MEAS - EDV(TEICH): 163.9 ML
BH CV ECHO MEAS - EF(CUBED): 52 %
BH CV ECHO MEAS - EF(MOD-SP4): 50.1 %
BH CV ECHO MEAS - EF(TEICH): 43.3 %
BH CV ECHO MEAS - ESV(CUBED): 91.7 ML
BH CV ECHO MEAS - ESV(MOD-SP4): 72.8 ML
BH CV ECHO MEAS - ESV(TEICH): 92.9 ML
BH CV ECHO MEAS - FS: 21.7 %
BH CV ECHO MEAS - IVS/LVPW: 1.1
BH CV ECHO MEAS - IVSD: 1.2 CM
BH CV ECHO MEAS - LA DIMENSION: 5.7 CM
BH CV ECHO MEAS - LA/AO: 1.5
BH CV ECHO MEAS - LV DIASTOLIC VOL/BSA (35-75): 70.8 ML/M^2
BH CV ECHO MEAS - LV MASS(C)D: 290.3 GRAMS
BH CV ECHO MEAS - LV MASS(C)DI: 140.9 GRAMS/M^2
BH CV ECHO MEAS - LV MAX PG: 2.8 MMHG
BH CV ECHO MEAS - LV MEAN PG: 1 MMHG
BH CV ECHO MEAS - LV SYSTOLIC VOL/BSA (12-30): 35.3 ML/M^2
BH CV ECHO MEAS - LV V1 MAX: 84.2 CM/SEC
BH CV ECHO MEAS - LV V1 MEAN: 51.7 CM/SEC
BH CV ECHO MEAS - LV V1 VTI: 17 CM
BH CV ECHO MEAS - LVIDD: 5.8 CM
BH CV ECHO MEAS - LVIDS: 4.5 CM
BH CV ECHO MEAS - LVLD AP4: 9 CM
BH CV ECHO MEAS - LVLS AP4: 7.6 CM
BH CV ECHO MEAS - LVOT AREA (M): 3.1 CM^2
BH CV ECHO MEAS - LVOT AREA: 3.1 CM^2
BH CV ECHO MEAS - LVOT DIAM: 2 CM
BH CV ECHO MEAS - LVPWD: 1.2 CM
BH CV ECHO MEAS - MR ALIAS VEL: 35.7 CM/SEC
BH CV ECHO MEAS - MR ERO: 0.08 CM^2
BH CV ECHO MEAS - MR FLOW RATE: 35.9 CM^3/SEC
BH CV ECHO MEAS - MR MAX PG: 84.2 MMHG
BH CV ECHO MEAS - MR MAX VEL: 456.5 CM/SEC
BH CV ECHO MEAS - MR MEAN PG: 64 MMHG
BH CV ECHO MEAS - MR MEAN VEL: 373 CM/SEC
BH CV ECHO MEAS - MR PISA RADIUS: 0.4 CM
BH CV ECHO MEAS - MR PISA: 1 CM^2
BH CV ECHO MEAS - MR VOLUME: 12.7 ML
BH CV ECHO MEAS - MR VTI: 162 CM
BH CV ECHO MEAS - MV DEC TIME: 0.17 SEC
BH CV ECHO MEAS - MV E MAX VEL: 103 CM/SEC
BH CV ECHO MEAS - RAP SYSTOLE: 5 MMHG
BH CV ECHO MEAS - RVSP: 43.4 MMHG
BH CV ECHO MEAS - SI(AO): 115.8 ML/M^2
BH CV ECHO MEAS - SI(CUBED): 48.2 ML/M^2
BH CV ECHO MEAS - SI(LVOT): 25.9 ML/M^2
BH CV ECHO MEAS - SI(MOD-SP4): 35.5 ML/M^2
BH CV ECHO MEAS - SI(TEICH): 34.4 ML/M^2
BH CV ECHO MEAS - SV(AO): 238.7 ML
BH CV ECHO MEAS - SV(CUBED): 99.4 ML
BH CV ECHO MEAS - SV(LVOT): 53.4 ML
BH CV ECHO MEAS - SV(MOD-SP4): 73.2 ML
BH CV ECHO MEAS - SV(TEICH): 71 ML
BH CV ECHO MEAS - TR MAX VEL: 310 CM/SEC
BODY TEMPERATURE: 37 C
BUN BLD-MCNC: 17 MG/DL (ref 5–21)
BUN/CREAT SERPL: 18.9 (ref 7–25)
CALCIUM SPEC-SCNC: 8.2 MG/DL (ref 8.4–10.4)
CHLORIDE SERPL-SCNC: 98 MMOL/L (ref 98–110)
CO2 SERPL-SCNC: >40 MMOL/L (ref 24–31)
CREAT BLD-MCNC: 0.9 MG/DL (ref 0.5–1.4)
DEPRECATED RDW RBC AUTO: 53.5 FL (ref 40–54)
E/E' RATIO: 10.6
EOSINOPHIL # BLD AUTO: 0.05 10*3/MM3 (ref 0–0.7)
EOSINOPHIL NFR BLD AUTO: 0.7 % (ref 0–4)
ERYTHROCYTE [DISTWIDTH] IN BLOOD BY AUTOMATED COUNT: 16.9 % (ref 12–15)
GAS FLOW AIRWAY: 4 LPM
GFR SERPL CREATININE-BSD FRML MDRD: 84 ML/MIN/1.73
GLUCOSE BLD-MCNC: 115 MG/DL (ref 70–100)
HCO3 BLDA-SCNC: 42.7 MMOL/L (ref 20–26)
HCT VFR BLD AUTO: 30 % (ref 40–52)
HGB BLD-MCNC: 9.1 G/DL (ref 14–18)
IMM GRANULOCYTES # BLD: 0.02 10*3/MM3 (ref 0–0.03)
IMM GRANULOCYTES NFR BLD: 0.3 % (ref 0–5)
LEFT ATRIUM VOLUME INDEX: 61 ML/M2
LV EF 2D ECHO EST: 50 %
LYMPHOCYTES # BLD AUTO: 0.87 10*3/MM3 (ref 0.72–4.86)
LYMPHOCYTES NFR BLD AUTO: 12 % (ref 15–45)
Lab: ABNORMAL
Lab: ABNORMAL
MAXIMAL PREDICTED HEART RATE: 151 BPM
MCH RBC QN AUTO: 26.5 PG (ref 28–32)
MCHC RBC AUTO-ENTMCNC: 30.3 G/DL (ref 33–36)
MCV RBC AUTO: 87.5 FL (ref 82–95)
MODALITY: ABNORMAL
MONOCYTES # BLD AUTO: 0.81 10*3/MM3 (ref 0.19–1.3)
MONOCYTES NFR BLD AUTO: 11.2 % (ref 4–12)
NEUTROPHILS # BLD AUTO: 5.45 10*3/MM3 (ref 1.87–8.4)
NEUTROPHILS NFR BLD AUTO: 75.4 % (ref 39–78)
NOTIFIED BY: ABNORMAL
NOTIFIED WHO: ABNORMAL
NRBC BLD MANUAL-RTO: 0 /100 WBC (ref 0–0)
PCO2 BLDA: 68.5 MM HG (ref 35–45)
PH BLDA: 7.4 PH UNITS (ref 7.35–7.45)
PLATELET # BLD AUTO: 174 10*3/MM3 (ref 130–400)
PMV BLD AUTO: 11.7 FL (ref 6–12)
PO2 BLDA: 46.8 MM HG (ref 83–108)
POTASSIUM BLD-SCNC: 4.1 MMOL/L (ref 3.5–5.3)
RBC # BLD AUTO: 3.43 10*6/MM3 (ref 4.8–5.9)
SAO2 % BLDCOA: 79.3 % (ref 94–99)
SODIUM BLD-SCNC: 147 MMOL/L (ref 135–145)
STRESS TARGET HR: 128 BPM
VANCOMYCIN TROUGH SERPL-MCNC: 11.84 MCG/ML (ref 10–20)
VENTILATOR MODE: ABNORMAL
WBC NRBC COR # BLD: 7.23 10*3/MM3 (ref 4.8–10.8)

## 2018-03-20 PROCEDURE — 93005 ELECTROCARDIOGRAM TRACING: CPT | Performed by: FAMILY MEDICINE

## 2018-03-20 PROCEDURE — 94660 CPAP INITIATION&MGMT: CPT

## 2018-03-20 PROCEDURE — 25010000002 HALOPERIDOL LACTATE PER 5 MG: Performed by: FAMILY MEDICINE

## 2018-03-20 PROCEDURE — 85025 COMPLETE CBC W/AUTO DIFF WBC: CPT | Performed by: FAMILY MEDICINE

## 2018-03-20 PROCEDURE — 80202 ASSAY OF VANCOMYCIN: CPT | Performed by: INTERNAL MEDICINE

## 2018-03-20 PROCEDURE — 82803 BLOOD GASES ANY COMBINATION: CPT

## 2018-03-20 PROCEDURE — 94799 UNLISTED PULMONARY SVC/PX: CPT

## 2018-03-20 PROCEDURE — 25010000002 LORAZEPAM PER 2 MG: Performed by: FAMILY MEDICINE

## 2018-03-20 PROCEDURE — 25010000002 PIPERACILLIN SOD-TAZOBACTAM PER 1 G: Performed by: INTERNAL MEDICINE

## 2018-03-20 PROCEDURE — 71046 X-RAY EXAM CHEST 2 VIEWS: CPT

## 2018-03-20 PROCEDURE — 80048 BASIC METABOLIC PNL TOTAL CA: CPT | Performed by: INTERNAL MEDICINE

## 2018-03-20 PROCEDURE — 25010000002 VANCOMYCIN PER 500 MG: Performed by: INTERNAL MEDICINE

## 2018-03-20 PROCEDURE — 93010 ELECTROCARDIOGRAM REPORT: CPT | Performed by: INTERNAL MEDICINE

## 2018-03-20 PROCEDURE — 25010000002 FUROSEMIDE PER 20 MG: Performed by: FAMILY MEDICINE

## 2018-03-20 PROCEDURE — 36600 WITHDRAWAL OF ARTERIAL BLOOD: CPT

## 2018-03-20 RX ORDER — FAMOTIDINE 20 MG/1
20 TABLET, FILM COATED ORAL 2 TIMES DAILY
Status: DISCONTINUED | OUTPATIENT
Start: 2018-03-20 | End: 2018-03-25 | Stop reason: HOSPADM

## 2018-03-20 RX ORDER — QUETIAPINE FUMARATE 200 MG/1
200 TABLET, FILM COATED ORAL EVERY 12 HOURS SCHEDULED
Status: DISCONTINUED | OUTPATIENT
Start: 2018-03-20 | End: 2018-03-23

## 2018-03-20 RX ORDER — FUROSEMIDE 10 MG/ML
20 INJECTION INTRAMUSCULAR; INTRAVENOUS EVERY 12 HOURS
Status: DISCONTINUED | OUTPATIENT
Start: 2018-03-20 | End: 2018-03-25 | Stop reason: HOSPADM

## 2018-03-20 RX ORDER — LORAZEPAM 2 MG/ML
1 INJECTION INTRAMUSCULAR EVERY 6 HOURS PRN
Status: DISCONTINUED | OUTPATIENT
Start: 2018-03-20 | End: 2018-03-20

## 2018-03-20 RX ORDER — QUETIAPINE FUMARATE 200 MG/1
200 TABLET, FILM COATED ORAL NIGHTLY
Status: DISCONTINUED | OUTPATIENT
Start: 2018-03-20 | End: 2018-03-20

## 2018-03-20 RX ORDER — CEFDINIR 300 MG/1
300 CAPSULE ORAL EVERY 12 HOURS SCHEDULED
Status: DISPENSED | OUTPATIENT
Start: 2018-03-20 | End: 2018-03-23

## 2018-03-20 RX ORDER — HALOPERIDOL 5 MG/ML
2 INJECTION INTRAMUSCULAR ONCE
Status: COMPLETED | OUTPATIENT
Start: 2018-03-20 | End: 2018-03-20

## 2018-03-20 RX ORDER — HALOPERIDOL 5 MG/ML
2 INJECTION INTRAMUSCULAR EVERY 12 HOURS PRN
Status: DISCONTINUED | OUTPATIENT
Start: 2018-03-20 | End: 2018-03-20

## 2018-03-20 RX ORDER — HALOPERIDOL 5 MG/ML
2 INJECTION INTRAMUSCULAR EVERY 12 HOURS PRN
Status: DISCONTINUED | OUTPATIENT
Start: 2018-03-20 | End: 2018-03-24

## 2018-03-20 RX ORDER — LORAZEPAM 2 MG/ML
1 INJECTION INTRAMUSCULAR EVERY 4 HOURS PRN
Status: DISCONTINUED | OUTPATIENT
Start: 2018-03-20 | End: 2018-03-24

## 2018-03-20 RX ADMIN — CEFDINIR 300 MG: 300 CAPSULE ORAL at 14:48

## 2018-03-20 RX ADMIN — DABIGATRAN ETEXILATE MESYLATE 150 MG: 150 CAPSULE ORAL at 08:47

## 2018-03-20 RX ADMIN — BUSPIRONE HYDROCHLORIDE 10 MG: 10 TABLET ORAL at 16:14

## 2018-03-20 RX ADMIN — FUROSEMIDE 20 MG: 10 INJECTION, SOLUTION INTRAMUSCULAR; INTRAVENOUS at 21:14

## 2018-03-20 RX ADMIN — LORAZEPAM 1 MG: 2 INJECTION INTRAMUSCULAR; INTRAVENOUS at 13:53

## 2018-03-20 RX ADMIN — LISINOPRIL 40 MG: 20 TABLET ORAL at 08:47

## 2018-03-20 RX ADMIN — HALOPERIDOL LACTATE 2 MG: 5 INJECTION, SOLUTION INTRAMUSCULAR at 10:42

## 2018-03-20 RX ADMIN — CEFDINIR 300 MG: 300 CAPSULE ORAL at 22:54

## 2018-03-20 RX ADMIN — METOPROLOL TARTRATE 100 MG: 100 TABLET, FILM COATED ORAL at 08:47

## 2018-03-20 RX ADMIN — BUSPIRONE HYDROCHLORIDE 10 MG: 10 TABLET ORAL at 22:53

## 2018-03-20 RX ADMIN — FUROSEMIDE 40 MG: 40 TABLET ORAL at 08:47

## 2018-03-20 RX ADMIN — BUDESONIDE AND FORMOTEROL FUMARATE DIHYDRATE 2 PUFF: 160; 4.5 AEROSOL RESPIRATORY (INHALATION) at 20:27

## 2018-03-20 RX ADMIN — ASPIRIN 81 MG: 81 TABLET ORAL at 08:47

## 2018-03-20 RX ADMIN — ACETAMINOPHEN 650 MG: 325 TABLET, FILM COATED ORAL at 08:50

## 2018-03-20 RX ADMIN — QUETIAPINE FUMARATE 200 MG: 200 TABLET ORAL at 14:48

## 2018-03-20 RX ADMIN — BUDESONIDE AND FORMOTEROL FUMARATE DIHYDRATE 2 PUFF: 160; 4.5 AEROSOL RESPIRATORY (INHALATION) at 10:06

## 2018-03-20 RX ADMIN — VANCOMYCIN HYDROCHLORIDE 1000 MG: 1 INJECTION, SOLUTION INTRAVENOUS at 08:47

## 2018-03-20 RX ADMIN — GUAIFENESIN 1200 MG: 600 TABLET, EXTENDED RELEASE ORAL at 22:53

## 2018-03-20 RX ADMIN — TAZOBACTAM SODIUM AND PIPERACILLIN SODIUM 3.38 G: 375; 3 INJECTION, SOLUTION INTRAVENOUS at 11:57

## 2018-03-20 RX ADMIN — FAMOTIDINE 20 MG: 20 TABLET, FILM COATED ORAL at 08:47

## 2018-03-20 RX ADMIN — METOPROLOL TARTRATE 100 MG: 100 TABLET, FILM COATED ORAL at 22:53

## 2018-03-20 RX ADMIN — SOTALOL HYDROCHLORIDE 80 MG: 80 TABLET ORAL at 08:47

## 2018-03-20 RX ADMIN — SOTALOL HYDROCHLORIDE 80 MG: 80 TABLET ORAL at 22:53

## 2018-03-20 RX ADMIN — BUSPIRONE HYDROCHLORIDE 10 MG: 10 TABLET ORAL at 08:47

## 2018-03-20 RX ADMIN — GUAIFENESIN 1200 MG: 600 TABLET, EXTENDED RELEASE ORAL at 08:47

## 2018-03-20 RX ADMIN — DIVALPROEX SODIUM 1000 MG: 500 TABLET, DELAYED RELEASE ORAL at 08:47

## 2018-03-20 RX ADMIN — TAZOBACTAM SODIUM AND PIPERACILLIN SODIUM 3.38 G: 375; 3 INJECTION, SOLUTION INTRAVENOUS at 04:13

## 2018-03-20 RX ADMIN — ALLOPURINOL 100 MG: 100 TABLET ORAL at 08:47

## 2018-03-20 NOTE — PLAN OF CARE
Problem: Patient Care Overview  Goal: Individualization and Mutuality  Outcome: Ongoing (interventions implemented as appropriate)    Goal: Discharge Needs Assessment  Outcome: Ongoing (interventions implemented as appropriate)    Goal: Interprofessional Rounds/Family Conf  Outcome: Ongoing (interventions implemented as appropriate)      Problem: Fall Risk (Adult)  Goal: Absence of Fall  Outcome: Ongoing (interventions implemented as appropriate)      Problem: Pneumonia (Adult)  Goal: Signs and Symptoms of Listed Potential Problems Will be Absent, Minimized or Managed (Pneumonia)  Outcome: Ongoing (interventions implemented as appropriate)      Problem: Arrhythmia/Dysrhythmia (Symptomatic) (Adult)  Goal: Signs and Symptoms of Listed Potential Problems Will be Absent, Minimized or Managed (Arrhythmia/Dysrhythmia)  Outcome: Ongoing (interventions implemented as appropriate)

## 2018-03-20 NOTE — PLAN OF CARE
Problem: Patient Care Overview  Goal: Plan of Care Review  Outcome: Ongoing (interventions implemented as appropriate)   03/20/18 0646   Coping/Psychosocial   Plan of Care Reviewed With patient   Plan of Care Review   Progress no change   OTHER   Outcome Summary Pt transferred from CCU this AM. Pt confused and impulsive, pulling at tubes and lines and attempting to exit the bed unassisted. Safety maintained. VSS. AF rate controlled on tele.       Problem: Fall Risk (Adult)  Goal: Absence of Fall  Outcome: Ongoing (interventions implemented as appropriate)      Problem: Pneumonia (Adult)  Goal: Signs and Symptoms of Listed Potential Problems Will be Absent, Minimized or Managed (Pneumonia)  Outcome: Ongoing (interventions implemented as appropriate)      Problem: Arrhythmia/Dysrhythmia (Symptomatic) (Adult)  Goal: Signs and Symptoms of Listed Potential Problems Will be Absent, Minimized or Managed (Arrhythmia/Dysrhythmia)  Outcome: Ongoing (interventions implemented as appropriate)

## 2018-03-20 NOTE — PLAN OF CARE
Problem: Patient Care Overview  Goal: Plan of Care Review   03/20/18 9950   Coping/Psychosocial   Plan of Care Reviewed With patient   Plan of Care Review   Progress no change   OTHER   Outcome Summary Pt confused and trying to get out of bed. Denies pain. Bedcheck on.        Problem: Fall Risk (Adult)  Goal: Absence of Fall  Outcome: Ongoing (interventions implemented as appropriate)      Problem: Pneumonia (Adult)  Goal: Signs and Symptoms of Listed Potential Problems Will be Absent, Minimized or Managed (Pneumonia)  Outcome: Ongoing (interventions implemented as appropriate)      Problem: Arrhythmia/Dysrhythmia (Symptomatic) (Adult)  Goal: Signs and Symptoms of Listed Potential Problems Will be Absent, Minimized or Managed (Arrhythmia/Dysrhythmia)  Outcome: Ongoing (interventions implemented as appropriate)

## 2018-03-20 NOTE — PROGRESS NOTES
[facility] Services  INPATIENT PROGRESS NOTE    Length of Stay: 2  Date of Admission: 3/18/2018  Primary Care Physician: No Known Provider    Subjective   Chief Complaint: none today, f/u afib/rvr  HPI      transfer from [place] for bilateral pneumonia and A. fib with RVR.  The patient was fairly lethargic in the morning however improved as the day went on.  This morning patient has been fairly agitated and active.  He is pulled out a few of his IV lines.  Not been aggressive.  On my evaluation patient has been administered Haldol and currently is resting comfortably.  He is awake alert and able to communicate with me.  He is somewhat difficult to understand as he does mumble but for the most part he is appropriate.  He tells me that he wears 4 L of oxygen at home all the time.  He denies having any shortness of breath.  Has me that he overall has some generalized fatigue.  He has asked me how much longer I think he has left to live.      Review of Systems     All pertinent negatives and positives are as above. All other systems have been reviewed and are negative unless otherwise stated.     Objective    Temp:  [97.6 °F (36.4 °C)-98.6 °F (37 °C)] 97.9 °F (36.6 °C)  Heart Rate:  [] 77  Resp:  [18-29] 18  BP: ()/() 122/68  Physical Exam   Constitutional: He appears well-developed and well-nourished.   HENT:   Head: Normocephalic and atraumatic.   Eyes: Conjunctivae and EOM are normal. Pupils are equal, round, and reactive to light.   Neck: Neck supple. No JVD present. No thyromegaly present.   Cardiovascular: Regular rhythm, normal heart sounds and intact distal pulses.  Exam reveals no gallop and no friction rub.    No murmur heard.  Slightly tachycardic but regular   Pulmonary/Chest: Effort normal and breath sounds normal. No respiratory distress. He has no wheezes. He has no rales. He exhibits no tenderness.   Sal he seemed short of breath mostly  at the end of his sentences.  Slightly diminished breath sounds at bases.   Abdominal: Soft. Bowel sounds are normal. He exhibits no distension. There is no tenderness. There is no rebound and no guarding.   Musculoskeletal: Normal range of motion. He exhibits no edema, tenderness or deformity.   Lymphadenopathy:     He has no cervical adenopathy.   Neurological: He displays normal reflexes. No cranial nerve deficit. He exhibits normal muscle tone.   Much more awake today.  Mostly appropriate but he does mumble and is intermittently confused.   Skin: Skin is warm and dry. No rash noted.           Results Review:  I have reviewed the labs, radiology results, and diagnostic studies.    Laboratory Data:     Results from last 7 days  Lab Units 03/20/18  0910   WBC 10*3/mm3 7.23   HEMOGLOBIN g/dL 9.1*   HEMATOCRIT % 30.0*   PLATELETS 10*3/mm3 174          Results from last 7 days  Lab Units 03/20/18  0243 03/19/18  0011 03/18/18  1903   SODIUM mmol/L 147* 145 145   POTASSIUM mmol/L 4.1 3.7 3.9   CHLORIDE mmol/L 98 97* 97*   CO2 mmol/L >40.0* 39.0* >40.0*   BUN mg/dL 17 16 20   CREATININE mg/dL 0.90 0.80 0.93   CALCIUM mg/dL 8.2* 8.2* 8.2*   GLUCOSE mg/dL 115* 169* 96       Culture Data:        Radiology Data:   Imaging Results (last 24 hours)     Procedure Component Value Units Date/Time    XR Chest PA & Lateral [[id number]] Collected:  03/20/18 0918     Updated:  03/20/18 0923    Narrative:       EXAMINATION:  XR CHEST PA AND LATERAL-  3/20/2018 9:10 AM CDT     HISTORY: Follow-up for pneumonia.     COMPARISON: 8/17/2017.     TECHNIQUE: 2 views were obtained.     FINDINGS:  The inspiration is not very deep. There is cardiomegaly with  vascular redistribution. There is patchy interstitial infiltrate right  greater than left. No significant pleural effusion is seen. There are  degenerative changes of the spine and shoulders.       Impression:       1. Mild hypoventilation.  2. Cardiomegaly with vascular redistribution and  probable mild edema.         This report was finalized on 03/20/2018 09:20 by Dr. Rio Phillip MD.          I have reviewed the patient current medications.     Assessment/Plan     Hospital Problem List     Atrial fibrillation with RVR          1. Atrial fib/RVR-HR improved on home meds  2. Acute diastolic CHF exacerbation with mild edema on CXR and elevated bnp  3. Substance abuse  4. Bilateral PNA on CXR from [facility]-no evidence of PNA here, rather some edema on CXR  5. Unclear baseline as far as his mentation. Possibly some TME medication related. Possible some withdrawal sx as his tox screen from [place] was positive for benzos. These are not listed on his home meds    Plan  1. Continue oral meds  2. Hold night seroquel if remains this sleepy, can resume today as he is a bit agitated  3. Will d/c vanc and zosyn. Change to oral omnicef to complete a course  4. Change from oral to IV lasix due to CXR findings of edmea  5. Give prn haldol. Will have ativan available due to his tox screen results. Discussed with nursing staff.   6. SW to see to talk with family  7. PT to eval              Discharge Planning: I expect the patient to be discharged to home in 1-2 days.    Delmy Vieyra MD   03/20/18   1:44 PM

## 2018-03-20 NOTE — PROGRESS NOTES
"Continued Stay Note  [facility]     Patient Name: Froy Hoyos  MRN: [id number]  Today's Date: 3/20/2018    Admit Date: 3/18/2018          Discharge Plan     Row Name 03/20/18 1429       Plan    Plan SW has spoken with PT and he was unable to participate in conversation, as he was not oriented and was hallucinating. Per, RN, PT has been this way most all day and is not able to communicate effectively at this time. SW has called PT's family and spoke with his daughter-in-law, Roma. According to Roma, PT has a history of substance abuse and \"sees people who aren't there, all the time.\" Roma states that PT will return home with her and her  upon dc. They are requesting HH and want to use [facility]. [facility] attends [facility] and [facility]ard [facility] [place] [facility]. SW will arrange HH once orders are written. Will follow.     Patient/Family in Agreement with Plan yes              Discharge Codes    No documentation.           JOSE ROBERTO Brower    "

## 2018-03-21 ENCOUNTER — APPOINTMENT (OUTPATIENT)
Dept: CT IMAGING | Facility: HOSPITAL | Age: 68
End: 2018-03-21

## 2018-03-21 LAB
ANION GAP SERPL CALCULATED.3IONS-SCNC: ABNORMAL MMOL/L (ref 4–13)
ARTERIAL PATENCY WRIST A: POSITIVE
ATMOSPHERIC PRESS: 752 MMHG
ATMOSPHERIC PRESS: 754 MMHG
ATMOSPHERIC PRESS: 755 MMHG
BASE EXCESS BLDA CALC-SCNC: 16.9 MMOL/L (ref 0–2)
BASE EXCESS BLDA CALC-SCNC: 17.8 MMOL/L (ref 0–2)
BASE EXCESS BLDA CALC-SCNC: 18.3 MMOL/L (ref 0–2)
BDY SITE: ABNORMAL
BODY TEMPERATURE: 37 C
BUN BLD-MCNC: 15 MG/DL (ref 5–21)
BUN/CREAT SERPL: 16 (ref 7–25)
CALCIUM SPEC-SCNC: 9.1 MG/DL (ref 8.4–10.4)
CHLORIDE SERPL-SCNC: 96 MMOL/L (ref 98–110)
CO2 SERPL-SCNC: >40 MMOL/L (ref 24–31)
CREAT BLD-MCNC: 0.94 MG/DL (ref 0.5–1.4)
EPAP: 4
EPAP: 8
EPAP: 8
GFR SERPL CREATININE-BSD FRML MDRD: 80 ML/MIN/1.73
GLUCOSE BLD-MCNC: 121 MG/DL (ref 70–100)
GLUCOSE BLDC GLUCOMTR-MCNC: 110 MG/DL (ref 70–130)
HCO3 BLDA-SCNC: 43.7 MMOL/L (ref 20–26)
HCO3 BLDA-SCNC: 45.4 MMOL/L (ref 20–26)
HCO3 BLDA-SCNC: 46 MMOL/L (ref 20–26)
HOROWITZ INDEX BLD+IHG-RTO: 35 %
HOROWITZ INDEX BLD+IHG-RTO: 50 %
HOROWITZ INDEX BLD+IHG-RTO: 50 %
IPAP: 16
IPAP: 16
IPAP: 19
Lab: ABNORMAL
MODALITY: ABNORMAL
NOTIFIED BY: ABNORMAL
NOTIFIED WHO: ABNORMAL
PCO2 BLDA: 67.8 MM HG (ref 35–45)
PCO2 BLDA: 71 MM HG (ref 35–45)
PCO2 BLDA: 82.2 MM HG (ref 35–45)
PH BLDA: 7.36 PH UNITS (ref 7.35–7.45)
PH BLDA: 7.42 PH UNITS (ref 7.35–7.45)
PH BLDA: 7.42 PH UNITS (ref 7.35–7.45)
PO2 BLDA: 168 MM HG (ref 83–108)
PO2 BLDA: 93 MM HG (ref 83–108)
PO2 BLDA: 93.9 MM HG (ref 83–108)
POTASSIUM BLD-SCNC: 3.8 MMOL/L (ref 3.5–5.3)
SAO2 % BLDCOA: 100 % (ref 94–99)
SAO2 % BLDCOA: 97.7 % (ref 94–99)
SAO2 % BLDCOA: 98.1 % (ref 94–99)
SET MECH RESP RATE: 12
SODIUM BLD-SCNC: 151 MMOL/L (ref 135–145)
VENTILATOR MODE: ABNORMAL

## 2018-03-21 PROCEDURE — 82962 GLUCOSE BLOOD TEST: CPT

## 2018-03-21 PROCEDURE — 82803 BLOOD GASES ANY COMBINATION: CPT

## 2018-03-21 PROCEDURE — 25010000002 FUROSEMIDE PER 20 MG: Performed by: FAMILY MEDICINE

## 2018-03-21 PROCEDURE — 25010000002 IOPAMIDOL 61 % SOLUTION: Performed by: FAMILY MEDICINE

## 2018-03-21 PROCEDURE — 94799 UNLISTED PULMONARY SVC/PX: CPT

## 2018-03-21 PROCEDURE — 25010000002 LORAZEPAM PER 2 MG: Performed by: NURSE PRACTITIONER

## 2018-03-21 PROCEDURE — 94660 CPAP INITIATION&MGMT: CPT

## 2018-03-21 PROCEDURE — 36600 WITHDRAWAL OF ARTERIAL BLOOD: CPT

## 2018-03-21 PROCEDURE — 80048 BASIC METABOLIC PNL TOTAL CA: CPT | Performed by: INTERNAL MEDICINE

## 2018-03-21 PROCEDURE — 71260 CT THORAX DX C+: CPT

## 2018-03-21 RX ORDER — DEXTROSE, SODIUM CHLORIDE, AND POTASSIUM CHLORIDE 5; .45; .15 G/100ML; G/100ML; G/100ML
75 INJECTION INTRAVENOUS CONTINUOUS
Status: DISCONTINUED | OUTPATIENT
Start: 2018-03-21 | End: 2018-03-25 | Stop reason: HOSPADM

## 2018-03-21 RX ADMIN — DEXTROSE MONOHYDRATE, SODIUM CHLORIDE, AND POTASSIUM CHLORIDE 75 ML/HR: 50; 4.5; 1.49 INJECTION, SOLUTION INTRAVENOUS at 10:26

## 2018-03-21 RX ADMIN — LISINOPRIL 40 MG: 20 TABLET ORAL at 08:02

## 2018-03-21 RX ADMIN — DEXTROSE MONOHYDRATE, SODIUM CHLORIDE, AND POTASSIUM CHLORIDE 75 ML/HR: 50; 4.5; 1.49 INJECTION, SOLUTION INTRAVENOUS at 23:12

## 2018-03-21 RX ADMIN — DABIGATRAN ETEXILATE MESYLATE 150 MG: 150 CAPSULE ORAL at 08:02

## 2018-03-21 RX ADMIN — IOPAMIDOL 100 ML: 612 INJECTION, SOLUTION INTRAVENOUS at 20:45

## 2018-03-21 RX ADMIN — ASPIRIN 81 MG: 81 TABLET ORAL at 08:02

## 2018-03-21 RX ADMIN — LORAZEPAM 1 MG: 2 INJECTION INTRAMUSCULAR; INTRAVENOUS at 18:20

## 2018-03-21 RX ADMIN — GUAIFENESIN 1200 MG: 600 TABLET, EXTENDED RELEASE ORAL at 08:02

## 2018-03-21 RX ADMIN — SOTALOL HYDROCHLORIDE 80 MG: 80 TABLET ORAL at 08:01

## 2018-03-21 RX ADMIN — CEFDINIR 300 MG: 300 CAPSULE ORAL at 08:01

## 2018-03-21 RX ADMIN — ALLOPURINOL 100 MG: 100 TABLET ORAL at 08:02

## 2018-03-21 RX ADMIN — LORAZEPAM 1 MG: 2 INJECTION INTRAMUSCULAR; INTRAVENOUS at 05:37

## 2018-03-21 RX ADMIN — BUDESONIDE AND FORMOTEROL FUMARATE DIHYDRATE 2 PUFF: 160; 4.5 AEROSOL RESPIRATORY (INHALATION) at 07:37

## 2018-03-21 RX ADMIN — BUSPIRONE HYDROCHLORIDE 10 MG: 10 TABLET ORAL at 08:01

## 2018-03-21 RX ADMIN — METOPROLOL TARTRATE 100 MG: 100 TABLET, FILM COATED ORAL at 21:09

## 2018-03-21 RX ADMIN — METOPROLOL TARTRATE 100 MG: 100 TABLET, FILM COATED ORAL at 08:02

## 2018-03-21 RX ADMIN — DIVALPROEX SODIUM 1000 MG: 500 TABLET, DELAYED RELEASE ORAL at 08:02

## 2018-03-21 RX ADMIN — QUETIAPINE FUMARATE 200 MG: 200 TABLET ORAL at 08:02

## 2018-03-21 RX ADMIN — FUROSEMIDE 20 MG: 10 INJECTION, SOLUTION INTRAMUSCULAR; INTRAVENOUS at 08:01

## 2018-03-21 RX ADMIN — FUROSEMIDE 20 MG: 10 INJECTION, SOLUTION INTRAMUSCULAR; INTRAVENOUS at 21:08

## 2018-03-21 RX ADMIN — FAMOTIDINE 20 MG: 20 TABLET, FILM COATED ORAL at 08:01

## 2018-03-21 NOTE — PLAN OF CARE
Problem: Patient Care Overview  Goal: Plan of Care Review  Outcome: Ongoing (interventions implemented as appropriate)   03/21/18 4903   Coping/Psychosocial   Plan of Care Reviewed With patient   Plan of Care Review   Progress declining   OTHER   Outcome Summary AFib 114-129 on tele, declining respiratory status this shift with declining level of conciousness. MD aware, ABGs being monitored. BIPAP on and only arouseable to vigorous sternal rub. Cont SPO2 depicts sats %, vss, turn q 2 hours, incontinent.      Goal: Individualization and Mutuality  Outcome: Ongoing (interventions implemented as appropriate)    Goal: Discharge Needs Assessment  Outcome: Ongoing (interventions implemented as appropriate)    Goal: Interprofessional Rounds/Family Conf  Outcome: Ongoing (interventions implemented as appropriate)      Problem: Fall Risk (Adult)  Goal: Absence of Fall  Outcome: Ongoing (interventions implemented as appropriate)      Problem: Skin Injury Risk (Adult)  Goal: Skin Health and Integrity  Outcome: Ongoing (interventions implemented as appropriate)      Problem: Pneumonia (Adult)  Goal: Signs and Symptoms of Listed Potential Problems Will be Absent, Minimized or Managed (Pneumonia)  Outcome: Ongoing (interventions implemented as appropriate)      Problem: Arrhythmia/Dysrhythmia (Symptomatic) (Adult)  Goal: Signs and Symptoms of Listed Potential Problems Will be Absent, Minimized or Managed (Arrhythmia/Dysrhythmia)  Outcome: Ongoing (interventions implemented as appropriate)

## 2018-03-21 NOTE — PLAN OF CARE
Problem: Patient Care Overview  Goal: Plan of Care Review  Outcome: Outcome(s) achieved Date Met: 03/21/18 03/21/18 6176   Coping/Psychosocial   Plan of Care Reviewed With patient   Plan of Care Review   Progress no change   OTHER   Outcome Summary AF . Pt confused and agitated at beginning of shift, PRN meds ordered. O2 sats dropped into mid 70s, ABGs done. Bipap ordered but patient began to improve with nasal cannula. Bipap has not been needed. Pt has been resting most of night. Will continue to monitor.        Problem: Fall Risk (Adult)  Goal: Absence of Fall  Outcome: Ongoing (interventions implemented as appropriate)   03/20/18 6355   Fall Risk (Adult)   Absence of Fall making progress toward outcome       Problem: Pneumonia (Adult)  Goal: Signs and Symptoms of Listed Potential Problems Will be Absent, Minimized or Managed (Pneumonia)  Outcome: Ongoing (interventions implemented as appropriate)   03/20/18 8775   Goal/Outcome Evaluation   Problems Assessed (Pneumonia) all   Problems Present (Pneumonia) respiratory compromise       Problem: Arrhythmia/Dysrhythmia (Symptomatic) (Adult)  Goal: Signs and Symptoms of Listed Potential Problems Will be Absent, Minimized or Managed (Arrhythmia/Dysrhythmia)  Outcome: Ongoing (interventions implemented as appropriate)   03/20/18 7590   Goal/Outcome Evaluation   Problems Assessed (Arrhythmia/Dysrhythmia) all   Problems Present (Dysrhythmia) situational response

## 2018-03-21 NOTE — NURSING NOTE
"Pt pulled off BIPAP, swinging at nurses and cursing \"Get the hell away from me!\" Uncooperative with all care. Trying to pull lines out, will not keep oxygen on, trying to get out of bed despite multiple attempts to redirect. Placed on nasal cannula at 4L and maintaining sat 92-93% when he will keep cannula on, heart rate elevated to 110s-120s with activity.  "

## 2018-03-21 NOTE — NURSING NOTE
BIPAP is alarming due to apnea alarm, pt is having periods of apnea/shallow respirations. Will respond to vigorous sternal rub only, PERRLA, head and neck are diaphoretic. VSS (charted), glucose 110, pt afebrile. Lower body is dry and warm, some blankets taken off of patient, diaphoresis did improve. Dr. Vieyra made aware of patient's decreased level of conciousness and condition and orders received.

## 2018-03-21 NOTE — PROGRESS NOTES
[facility] Medicine Services  INPATIENT PROGRESS NOTE    Length of Stay: 3  Date of Admission: 3/18/2018  Primary Care Physician: No Known Provider    Subjective   Chief Complaint: none today, f/u afib/rvr  HPI      transfer from [place] for bilateral pneumonia and A. fib with RVR.  The patient Had a bit of waxing and waning of his mental status.  Initial evaluation he was fairly lethargic therefore had held his home Seroquel that evening.  Last morning however the patient was extremely agitated before attempted today to break his circumflex one half from 400-200 every morning and 200 daily at bedtime.  Tylenol evaluation this morning patient is been placed on BiPAP for abnormal ABGs currently is difficult to even arouse.  He has just had Seroquel.  He has been up awake and fairly agitated all night requiring a sitter.  Family states this is how he is at home sometimes.      Review of Systems     All pertinent negatives and positives are as above. All other systems have been reviewed and are negative unless otherwise stated.     Objective    Temp:  [96.9 °F (36.1 °C)-99.8 °F (37.7 °C)] 99.8 °F (37.7 °C)  Heart Rate:  [] 123  Resp:  [18-27] 24  BP: (122-158)/(68-99) 158/93  Physical Exam   Constitutional: He appears well-developed and well-nourished.   Shunt seems much more sleepy today.  Currently wearing BiPAP.  He is minimally arousable.   HENT:   Head: Normocephalic and atraumatic.   Eyes: Conjunctivae and EOM are normal. Pupils are equal, round, and reactive to light.   Neck: Neck supple. No JVD present. No thyromegaly present.   Cardiovascular: Normal heart sounds and intact distal pulses.  Exam reveals no gallop and no friction rub.    No murmur heard.  Slightly tachycardic and irregular   Pulmonary/Chest: Effort normal. No respiratory distress. He has no wheezes. He has no rales. He exhibits no tenderness.   Breathing comfortably in no distress.  He does have  diminished breath sounds bilaterally however no wheezes noted   Abdominal: Soft. Bowel sounds are normal. He exhibits no distension. There is no tenderness. There is no rebound and no guarding.   Musculoskeletal: Normal range of motion. He exhibits no edema, tenderness or deformity.   Lymphadenopathy:     He has no cervical adenopathy.   Neurological: He displays normal reflexes. No cranial nerve deficit. He exhibits normal muscle tone.   Alert today like he was 2 days ago   Skin: Skin is warm and dry. No rash noted.           Results Review:  I have reviewed the labs, radiology results, and diagnostic studies.    Laboratory Data:     Results from last 7 days  Lab Units 03/20/18  0910   WBC 10*3/mm3 7.23   HEMOGLOBIN g/dL 9.1*   HEMATOCRIT % 30.0*   PLATELETS 10*3/mm3 174          Results from last 7 days  Lab Units 03/21/18  0617 03/20/18  0243 03/19/18  0011   SODIUM mmol/L 151* 147* 145   POTASSIUM mmol/L 3.8 4.1 3.7   CHLORIDE mmol/L 96* 98 97*   CO2 mmol/L >40.0* >40.0* 39.0*   BUN mg/dL 15 17 16   CREATININE mg/dL 0.94 0.90 0.80   CALCIUM mg/dL 9.1 8.2* 8.2*   GLUCOSE mg/dL 121* 115* 169*       Culture Data:        Radiology Data:   Imaging Results (last 24 hours)     Procedure Component Value Units Date/Time    XR Chest PA & Lateral [[id number]] Collected:  03/20/18 0918     Updated:  03/20/18 0923    Narrative:       EXAMINATION:  XR CHEST PA AND LATERAL-  3/20/2018 9:10 AM CDT     HISTORY: Follow-up for pneumonia.     COMPARISON: 8/17/2017.     TECHNIQUE: 2 views were obtained.     FINDINGS:  The inspiration is not very deep. There is cardiomegaly with  vascular redistribution. There is patchy interstitial infiltrate right  greater than left. No significant pleural effusion is seen. There are  degenerative changes of the spine and shoulders.       Impression:       1. Mild hypoventilation.  2. Cardiomegaly with vascular redistribution and probable mild edema.         This report was finalized on 03/20/2018  09:20 by Dr. Rio Phillip MD.          I have reviewed the patient current medications.     Assessment/Plan     Hospital Problem List     Atrial fibrillation with RVR          1. Atrial fib/RVR-HR improved on home meds, intermittently tachy  2. Acute diastolic CHF exacerbation with mild edema on CXR and elevated bnp  3. Substance abuse  4. Bilateral PNA on CXR from [facility]-no evidence of PNA here, rather some edema on CXR  5. Unclear baseline as far as his mentation. Possibly some TME medication related. Possible some withdrawal sx as his tox screen from [place] was positive for benzos. These are not listed on his home meds  6. Hypernatremia  7. Acute on chronic hypoxic and hypercapneic respiratory failure now needing bipap. Unclear etiology as CXR was not that impressive    Plan  1. Continue oral meds if awake enough to take them  2. Will place on bipap due to hypoxia yesterday and abnormal abgs  3.  Changed to oral omnicef yesterday after 2 days of vanoc and zosyn. Pt respiratory status worsening overnight. CXR yesterday did not demonstrate pna rather mild edema.   4. Will place on gentle fluids of 1/2 ns due to worsening hypernatremia and little po intake. Repeat bmp at 1300  5.  Change seroquel to 200 BID for agitation , may need a reduced dose as this may be contributing to his lethary  6. SW to see to talk with family. Plan is home with . This behavior is not particularly abnormal for him  7. EKG with normal QT  8. Check CT chest for better evaluation of pna/edema in setting of worsening respiratory situation              Discharge Planning: I expect the patient to be discharged to home in 1-2 days.    Delmy Vieyra MD   03/21/18   8:07 AM

## 2018-03-22 LAB
ANION GAP SERPL CALCULATED.3IONS-SCNC: ABNORMAL MMOL/L (ref 4–13)
ARTERIAL PATENCY WRIST A: POSITIVE
ATMOSPHERIC PRESS: 758 MMHG
BASE EXCESS BLDA CALC-SCNC: 17.9 MMOL/L (ref 0–2)
BDY SITE: ABNORMAL
BODY TEMPERATURE: 37 C
BUN BLD-MCNC: 19 MG/DL (ref 5–21)
BUN/CREAT SERPL: 19.2 (ref 7–25)
CALCIUM SPEC-SCNC: 8.4 MG/DL (ref 8.4–10.4)
CHLORIDE SERPL-SCNC: 96 MMOL/L (ref 98–110)
CO2 SERPL-SCNC: >40 MMOL/L (ref 24–31)
CREAT BLD-MCNC: 0.99 MG/DL (ref 0.5–1.4)
EPAP: 4
GFR SERPL CREATININE-BSD FRML MDRD: 75 ML/MIN/1.73
GLUCOSE BLD-MCNC: 107 MG/DL (ref 70–100)
HCO3 BLDA-SCNC: 44.6 MMOL/L (ref 20–26)
HOROWITZ INDEX BLD+IHG-RTO: 35 %
IPAP: 19
Lab: ABNORMAL
Lab: ABNORMAL
MODALITY: ABNORMAL
NOTIFIED BY: ABNORMAL
NOTIFIED WHO: ABNORMAL
PCO2 BLDA: 67.4 MM HG (ref 35–45)
PH BLDA: 7.43 PH UNITS (ref 7.35–7.45)
PO2 BLDA: 68 MM HG (ref 83–108)
POTASSIUM BLD-SCNC: 3.9 MMOL/L (ref 3.5–5.3)
SAO2 % BLDCOA: 93.7 % (ref 94–99)
SODIUM BLD-SCNC: 151 MMOL/L (ref 135–145)
VENTILATOR MODE: ABNORMAL

## 2018-03-22 PROCEDURE — 80048 BASIC METABOLIC PNL TOTAL CA: CPT | Performed by: INTERNAL MEDICINE

## 2018-03-22 PROCEDURE — 82803 BLOOD GASES ANY COMBINATION: CPT

## 2018-03-22 PROCEDURE — 94799 UNLISTED PULMONARY SVC/PX: CPT

## 2018-03-22 PROCEDURE — 25010000002 FUROSEMIDE PER 20 MG: Performed by: FAMILY MEDICINE

## 2018-03-22 PROCEDURE — 36600 WITHDRAWAL OF ARTERIAL BLOOD: CPT

## 2018-03-22 PROCEDURE — 25010000002 LORAZEPAM PER 2 MG: Performed by: NURSE PRACTITIONER

## 2018-03-22 RX ADMIN — QUETIAPINE FUMARATE 200 MG: 200 TABLET ORAL at 08:15

## 2018-03-22 RX ADMIN — DABIGATRAN ETEXILATE MESYLATE 150 MG: 150 CAPSULE ORAL at 20:17

## 2018-03-22 RX ADMIN — GUAIFENESIN 1200 MG: 600 TABLET, EXTENDED RELEASE ORAL at 08:15

## 2018-03-22 RX ADMIN — GUAIFENESIN 1200 MG: 600 TABLET, EXTENDED RELEASE ORAL at 20:17

## 2018-03-22 RX ADMIN — METOPROLOL TARTRATE 100 MG: 100 TABLET, FILM COATED ORAL at 20:28

## 2018-03-22 RX ADMIN — LORAZEPAM 1 MG: 2 INJECTION INTRAMUSCULAR; INTRAVENOUS at 05:56

## 2018-03-22 RX ADMIN — DESMOPRESSIN ACETATE 20 MG: 0.2 TABLET ORAL at 20:17

## 2018-03-22 RX ADMIN — LORAZEPAM 1 MG: 2 INJECTION INTRAMUSCULAR; INTRAVENOUS at 14:48

## 2018-03-22 RX ADMIN — SOTALOL HYDROCHLORIDE 80 MG: 80 TABLET ORAL at 08:15

## 2018-03-22 RX ADMIN — FUROSEMIDE 20 MG: 10 INJECTION, SOLUTION INTRAMUSCULAR; INTRAVENOUS at 08:14

## 2018-03-22 RX ADMIN — FAMOTIDINE 20 MG: 20 TABLET, FILM COATED ORAL at 20:15

## 2018-03-22 RX ADMIN — CEFDINIR 300 MG: 300 CAPSULE ORAL at 20:15

## 2018-03-22 RX ADMIN — FUROSEMIDE 20 MG: 10 INJECTION, SOLUTION INTRAMUSCULAR; INTRAVENOUS at 20:15

## 2018-03-22 RX ADMIN — METOPROLOL TARTRATE 100 MG: 100 TABLET, FILM COATED ORAL at 08:15

## 2018-03-22 RX ADMIN — DIVALPROEX SODIUM 1000 MG: 500 TABLET, DELAYED RELEASE ORAL at 08:14

## 2018-03-22 RX ADMIN — LORAZEPAM 1 MG: 2 INJECTION INTRAMUSCULAR; INTRAVENOUS at 00:18

## 2018-03-22 RX ADMIN — ASPIRIN 81 MG: 81 TABLET ORAL at 08:15

## 2018-03-22 RX ADMIN — LISINOPRIL 40 MG: 20 TABLET ORAL at 08:15

## 2018-03-22 RX ADMIN — QUETIAPINE FUMARATE 200 MG: 200 TABLET ORAL at 20:15

## 2018-03-22 RX ADMIN — FAMOTIDINE 20 MG: 20 TABLET, FILM COATED ORAL at 08:15

## 2018-03-22 RX ADMIN — SOTALOL HYDROCHLORIDE 80 MG: 80 TABLET ORAL at 20:28

## 2018-03-22 RX ADMIN — BUSPIRONE HYDROCHLORIDE 10 MG: 10 TABLET ORAL at 20:17

## 2018-03-22 RX ADMIN — BUDESONIDE AND FORMOTEROL FUMARATE DIHYDRATE 2 PUFF: 160; 4.5 AEROSOL RESPIRATORY (INHALATION) at 07:03

## 2018-03-22 RX ADMIN — CEFDINIR 300 MG: 300 CAPSULE ORAL at 08:14

## 2018-03-22 RX ADMIN — DABIGATRAN ETEXILATE MESYLATE 150 MG: 150 CAPSULE ORAL at 08:15

## 2018-03-22 RX ADMIN — BUSPIRONE HYDROCHLORIDE 10 MG: 10 TABLET ORAL at 08:14

## 2018-03-22 RX ADMIN — DEXTROSE MONOHYDRATE, SODIUM CHLORIDE, AND POTASSIUM CHLORIDE 75 ML/HR: 50; 4.5; 1.49 INJECTION, SOLUTION INTRAVENOUS at 12:49

## 2018-03-22 RX ADMIN — ALLOPURINOL 100 MG: 100 TABLET ORAL at 08:15

## 2018-03-22 NOTE — PROGRESS NOTES
[facility] Medicine Services  INPATIENT PROGRESS NOTE    Length of Stay: 4  Date of Admission: 3/18/2018  Primary Care Physician: No Known Provider    Subjective   Chief Complaint: none today, f/u afib/rvr  HPI     Again, transfer from [place] for bilateral pneumonia and A. fib with RVR.  The patient Had a bit of waxing and waning of his mental status. The patient apparently is cussing at staff one minute then lethargic the next. On my exam he is not arousable and no family is at bedside. Asked nursing to get ABG. Patient is on the BiPAP. Nursing staff states that patient has recently had ativan.       Review of Systems   Unobtainable from patient  All pertinent negatives and positives are as above. All other systems have been reviewed and are negative unless otherwise stated.     Objective    Temp:  [96.9 °F (36.1 °C)-98.7 °F (37.1 °C)] 97.4 °F (36.3 °C)  Heart Rate:  [] 92  Resp:  [18-20] 18  BP: (123-158)/(78-89) 123/78  FiO2 (%):  [35 %] 35 %  Physical Exam   Constitutional: He appears well-developed and well-nourished.   Shunt seems much more sleepy today.  Currently wearing BiPAP.  He is minimally arousable.   HENT:   Head: Normocephalic and atraumatic.   On BiPAP   Eyes: Conjunctivae are normal. No scleral icterus.   Neck: Neck supple. No thyromegaly present.   Cardiovascular: Normal heart sounds and intact distal pulses.    Slightly tachycardic and irregular   Pulmonary/Chest: Effort normal. No respiratory distress.   Breathing comfortably in no distress.  He does have diminished breath sounds bilaterally however no wheezes noted   Abdominal: Soft. Bowel sounds are normal. He exhibits no distension.   Musculoskeletal: Normal range of motion. He exhibits no edema.   Neurological:   Not able to assess.    Skin: Skin is warm and dry.   Psychiatric:   Not responsive           Results Review:  I have reviewed the labs, radiology results, and diagnostic  studies.    Laboratory Data:     Results from last 7 days  Lab Units 03/20/18  0910   WBC 10*3/mm3 7.23   HEMOGLOBIN g/dL 9.1*   HEMATOCRIT % 30.0*   PLATELETS 10*3/mm3 174          Results from last 7 days  Lab Units 03/22/18  0420 03/21/18  0617 03/20/18  0243   SODIUM mmol/L 151* 151* 147*   POTASSIUM mmol/L 3.9 3.8 4.1   CHLORIDE mmol/L 96* 96* 98   CO2 mmol/L >40.0* >40.0* >40.0*   BUN mg/dL 19 15 17   CREATININE mg/dL 0.99 0.94 0.90   CALCIUM mg/dL 8.4 9.1 8.2*   GLUCOSE mg/dL 107* 121* 115*       Culture Data:        Radiology Data:   Imaging Results (last 24 hours)     Procedure Component Value Units Date/Time    CT Chest With Contrast [[id number]] Collected:  03/21/18 2023     Updated:  03/21/18 2034    Narrative:       CT CHEST W CONTRAST- 3/21/2018 8:02 PM CDT     HISTORY: Shortness of breath; Z74.09-Other reduced mobility      COMPARISON: 9/30/2016      DLP: 796 mGy cm. Automated exposure control was utilized to diminish  patient radiation dose.     TECHNIQUE: Serial helical tomographic images of the chest were acquired  following the infusion of Isovue contrast intravenously. Bone and soft  tissue algorithms were provided.       FINDINGS:   Neck base: The imaged portion of the neck and thyroid gland is  unremarkable.      Lungs: There is patchy bilateral groundglass disease of the lungs  suggesting either interstitial edema or diffuse pneumonitis.. Small  bilateral pleural effusions are present with bilateral lower lobe  atelectasis and/or pneumonia within the lower lobes.. The trachea and  bronchial tree are patent.      Heart: There is moderate cardiomegaly. Coronary calcifications are noted  in the LAD distribution.. There is no pericardial effusion.      Vasculature: Aorta is normal in caliber and appearance without  significant atherosclerosis, stenosis, or aneurysm. No additional  coronary arteriosclerosis identified. The great vessels are normal in  appearance. The pulmonary arteries are normal in  appearance.     Lymph nodes: Borderline enlarged middle mediastinal nodes are present  including a 9 mm short axis right paratracheal node unchanged from the  previous study of September of 2016 suggesting benignity..      Bones and soft tissues: No acute osseous or soft tissue abnormality is  seen. There are no worrisome osseous lesions.      Upper abdomen: A left adrenal lesion measuring 3.67 m in size is  unchanged from the previous study. Represent an adenoma. There is a 1.6  cm benign hepatic cyst within the posterior segment of the right lobe of  the liver. Scattered diverticula are noted within the colon within the  upper abdomen without evidence of diverticulitis..        Impression:       1. Small bilateral pleural effusions with bilateral lower lobe  atelectasis or pneumonia. Groundglass disease is noted rather diffusely  within both lungs suggesting either a diffuse interstitial pneumonitis  or pulmonary edema.  2. Moderate cardiomegaly. Coronary calcifications are noted in the LAD  distribution.  3. Stable borderline enlarged mediastinal nodes including a 9 mm short  axis right paratracheal node. No enlarged axillary adenopathy is  present..        This report was finalized on 03/21/2018 20:31 by Dr. Rodriguez Chery MD.          I have reviewed the patient current medications.     Assessment/Plan     Hospital Problem List     Atrial fibrillation with RVR          1. Atrial fib/RVR-HR improved on home meds, intermittently tachy  2. Acute diastolic CHF exacerbation with mild edema on CXR and elevated bnp  3. Substance abuse  4. Bilateral PNA on CXR from [facility]-no evidence of PNA here, rather some edema on CXR  5. Unclear baseline as far as his mentation. Possibly some TME medication related. Possible some withdrawal sx as his tox screen from [place] was positive for benzos. These are not listed on his home meds  6. Hypernatremia  7. Acute on chronic hypoxic and hypercapneic respiratory failure now needing  bipap. Unclear etiology as CXR was not that impressive    Plan  1. Continue oral meds if awake enough to take them  2. Will place on bipap due to hypoxia , Will get stat ABG  3.  Changed to oral omnicef yesterday after 2 days of vanoc and zosyn. Pt respiratory status worsening overnight. CXR yesterday did not demonstrate pna rather mild edema.   4. Will place on gentle fluids of D5  5.  Change seroquel to 200 BID for agitation , may need a reduced dose as this may be contributing to his lethary  6. SW to see to talk with family. Plan is home with HH. This behavior is not particularly abnormal for him  7. EKG with normal QT  8. Ct chest Reviewed.       Discharge Planning: I expect the patient to be discharged to home in 1-2 days.    Nasreen Morton,    03/22/18   4:50 PM

## 2018-03-22 NOTE — PLAN OF CARE
Problem: Patient Care Overview  Goal: Plan of Care Review  Outcome: Ongoing (interventions implemented as appropriate)   03/22/18 0411   Coping/Psychosocial   Plan of Care Reviewed With patient   Plan of Care Review   Progress no change   OTHER   Outcome Summary AF . Pt tolerated BIPAP most of night, o2 sats WNL. Rested most of night. Will continue to monitor.        Problem: Fall Risk (Adult)  Goal: Absence of Fall  Outcome: Ongoing (interventions implemented as appropriate)   03/21/18 1553   Fall Risk (Adult)   Absence of Fall making progress toward outcome       Problem: Skin Injury Risk (Adult)  Goal: Skin Health and Integrity  Outcome: Ongoing (interventions implemented as appropriate)   03/21/18 1553   Skin Injury Risk (Adult)   Skin Health and Integrity making progress toward outcome       Problem: Pneumonia (Adult)  Goal: Signs and Symptoms of Listed Potential Problems Will be Absent, Minimized or Managed (Pneumonia)  Outcome: Ongoing (interventions implemented as appropriate)   03/21/18 1553   Goal/Outcome Evaluation   Problems Assessed (Pneumonia) all   Problems Present (Pneumonia) respiratory compromise       Problem: Arrhythmia/Dysrhythmia (Symptomatic) (Adult)  Goal: Signs and Symptoms of Listed Potential Problems Will be Absent, Minimized or Managed (Arrhythmia/Dysrhythmia)  Outcome: Ongoing (interventions implemented as appropriate)   03/21/18 1553   Goal/Outcome Evaluation   Problems Assessed (Arrhythmia/Dysrhythmia) all   Problems Present (Dysrhythmia) situational response;hypoxia/hypoxemia

## 2018-03-22 NOTE — PLAN OF CARE
Problem: Patient Care Overview  Goal: Plan of Care Review  Outcome: Ongoing (interventions implemented as appropriate)   03/22/18 7848   Coping/Psychosocial   Plan of Care Reviewed With patient   Plan of Care Review   Progress no change   OTHER   Outcome Summary Ativan PRN, pt either very agitated or somnolent, ABGs unchanged from yesterday . VSS. AFIB . Wearing BIPAP when he will tolerate.      Goal: Individualization and Mutuality  Outcome: Ongoing (interventions implemented as appropriate)    Goal: Discharge Needs Assessment  Outcome: Ongoing (interventions implemented as appropriate)    Goal: Interprofessional Rounds/Family Conf  Outcome: Ongoing (interventions implemented as appropriate)      Problem: Fall Risk (Adult)  Goal: Absence of Fall  Outcome: Ongoing (interventions implemented as appropriate)      Problem: Skin Injury Risk (Adult)  Goal: Skin Health and Integrity  Outcome: Ongoing (interventions implemented as appropriate)      Problem: Pneumonia (Adult)  Goal: Signs and Symptoms of Listed Potential Problems Will be Absent, Minimized or Managed (Pneumonia)  Outcome: Ongoing (interventions implemented as appropriate)      Problem: Arrhythmia/Dysrhythmia (Symptomatic) (Adult)  Goal: Signs and Symptoms of Listed Potential Problems Will be Absent, Minimized or Managed (Arrhythmia/Dysrhythmia)  Outcome: Ongoing (interventions implemented as appropriate)

## 2018-03-23 LAB
ANION GAP SERPL CALCULATED.3IONS-SCNC: ABNORMAL MMOL/L (ref 4–13)
BUN BLD-MCNC: 20 MG/DL (ref 5–21)
BUN/CREAT SERPL: 20 (ref 7–25)
CALCIUM SPEC-SCNC: 8.4 MG/DL (ref 8.4–10.4)
CHLORIDE SERPL-SCNC: 97 MMOL/L (ref 98–110)
CO2 SERPL-SCNC: >40 MMOL/L (ref 24–31)
CREAT BLD-MCNC: 1 MG/DL (ref 0.5–1.4)
GFR SERPL CREATININE-BSD FRML MDRD: 74 ML/MIN/1.73
GLUCOSE BLD-MCNC: 112 MG/DL (ref 70–100)
POTASSIUM BLD-SCNC: 4 MMOL/L (ref 3.5–5.3)
SODIUM BLD-SCNC: 149 MMOL/L (ref 135–145)

## 2018-03-23 PROCEDURE — 94799 UNLISTED PULMONARY SVC/PX: CPT

## 2018-03-23 PROCEDURE — 97110 THERAPEUTIC EXERCISES: CPT

## 2018-03-23 PROCEDURE — 94660 CPAP INITIATION&MGMT: CPT

## 2018-03-23 PROCEDURE — 80048 BASIC METABOLIC PNL TOTAL CA: CPT | Performed by: INTERNAL MEDICINE

## 2018-03-23 PROCEDURE — 97530 THERAPEUTIC ACTIVITIES: CPT

## 2018-03-23 PROCEDURE — 25010000002 FUROSEMIDE PER 20 MG: Performed by: FAMILY MEDICINE

## 2018-03-23 RX ORDER — QUETIAPINE FUMARATE 100 MG/1
100 TABLET, FILM COATED ORAL EVERY 12 HOURS SCHEDULED
Status: DISCONTINUED | OUTPATIENT
Start: 2018-03-23 | End: 2018-03-24

## 2018-03-23 RX ORDER — HYDRALAZINE HYDROCHLORIDE 20 MG/ML
20 INJECTION INTRAMUSCULAR; INTRAVENOUS EVERY 6 HOURS PRN
Status: DISCONTINUED | OUTPATIENT
Start: 2018-03-23 | End: 2018-03-25 | Stop reason: HOSPADM

## 2018-03-23 RX ADMIN — DABIGATRAN ETEXILATE MESYLATE 150 MG: 150 CAPSULE ORAL at 21:24

## 2018-03-23 RX ADMIN — DABIGATRAN ETEXILATE MESYLATE 150 MG: 150 CAPSULE ORAL at 07:40

## 2018-03-23 RX ADMIN — SOTALOL HYDROCHLORIDE 80 MG: 80 TABLET ORAL at 21:24

## 2018-03-23 RX ADMIN — ALLOPURINOL 100 MG: 100 TABLET ORAL at 07:40

## 2018-03-23 RX ADMIN — METOPROLOL TARTRATE 100 MG: 100 TABLET, FILM COATED ORAL at 21:24

## 2018-03-23 RX ADMIN — ASPIRIN 81 MG: 81 TABLET ORAL at 07:40

## 2018-03-23 RX ADMIN — FAMOTIDINE 20 MG: 20 TABLET, FILM COATED ORAL at 21:25

## 2018-03-23 RX ADMIN — QUETIAPINE FUMARATE 100 MG: 100 TABLET ORAL at 20:11

## 2018-03-23 RX ADMIN — SOTALOL HYDROCHLORIDE 80 MG: 80 TABLET ORAL at 07:41

## 2018-03-23 RX ADMIN — DEXTROSE MONOHYDRATE, SODIUM CHLORIDE, AND POTASSIUM CHLORIDE 75 ML/HR: 50; 4.5; 1.49 INJECTION, SOLUTION INTRAVENOUS at 01:56

## 2018-03-23 RX ADMIN — FAMOTIDINE 20 MG: 20 TABLET, FILM COATED ORAL at 07:41

## 2018-03-23 RX ADMIN — BUDESONIDE AND FORMOTEROL FUMARATE DIHYDRATE 2 PUFF: 160; 4.5 AEROSOL RESPIRATORY (INHALATION) at 07:26

## 2018-03-23 RX ADMIN — FUROSEMIDE 20 MG: 10 INJECTION, SOLUTION INTRAMUSCULAR; INTRAVENOUS at 07:41

## 2018-03-23 RX ADMIN — FUROSEMIDE 20 MG: 10 INJECTION, SOLUTION INTRAMUSCULAR; INTRAVENOUS at 20:11

## 2018-03-23 RX ADMIN — LISINOPRIL 40 MG: 20 TABLET ORAL at 07:40

## 2018-03-23 RX ADMIN — BUSPIRONE HYDROCHLORIDE 10 MG: 10 TABLET ORAL at 20:12

## 2018-03-23 RX ADMIN — BUSPIRONE HYDROCHLORIDE 10 MG: 10 TABLET ORAL at 07:40

## 2018-03-23 RX ADMIN — DESMOPRESSIN ACETATE 20 MG: 0.2 TABLET ORAL at 20:15

## 2018-03-23 RX ADMIN — QUETIAPINE FUMARATE 200 MG: 200 TABLET ORAL at 07:40

## 2018-03-23 RX ADMIN — METOPROLOL TARTRATE 100 MG: 100 TABLET, FILM COATED ORAL at 07:41

## 2018-03-23 RX ADMIN — DIVALPROEX SODIUM 1000 MG: 500 TABLET, DELAYED RELEASE ORAL at 07:40

## 2018-03-23 RX ADMIN — GUAIFENESIN 1200 MG: 600 TABLET, EXTENDED RELEASE ORAL at 21:25

## 2018-03-23 RX ADMIN — GUAIFENESIN 1200 MG: 600 TABLET, EXTENDED RELEASE ORAL at 07:40

## 2018-03-23 NOTE — PROGRESS NOTES
Continued Stay Note  RHIANNA Tadeo     Patient Name: Froy Hoyos  MRN: [id number]  Today's Date: 3/23/2018    Admit Date: 3/18/2018          Discharge Plan     Row Name 03/23/18 1124       Plan    Plan Home with [facility]    Patient/Family in Agreement with Plan yes    Plan Comments Spoke with pt's son, Froy [phone], about d/c planning. He still plans for pt to go home with [facility] . Will need orders to arrange.               Discharge Codes    No documentation.           HARSH Paniagua

## 2018-03-23 NOTE — THERAPY TREATMENT NOTE
Acute Care - Physical Therapy Treatment Note  [facility]     Patient Name: Froy Hoyos  : 1950  MRN: [id number]  Today's Date: 3/23/2018  Onset of Illness/Injury or Date of Surgery: 18  Date of Referral to PT: 18  Referring Physician: Dr. Morton    Admit Date: 3/18/2018    Visit Dx:    ICD-10-CM ICD-9-CM   1. Impaired mobility Z74.09 799.89     Patient Active Problem List   Diagnosis   • Chronic congestive heart failure   • HCAP (healthcare-associated pneumonia)   • Atrial fibrillation with RVR       Therapy Treatment    Therapy Treatment / Health Promotion    Treatment Time/Intention  Discipline: physical therapy assistant (18 : Shelby Gomez PTA)  Document Type: therapy note (daily note) (Nsg states pt. is better today and ok to attempt tx.) (18 : Shelby Gomez PTA)  Subjective Information: no complaints (18 : Shelby Gomez PTA)  Mode of Treatment: physical therapy (18 : Shelby Gomez PTA)  Comment: Pt's words were understandable at first, but then became unintelligible. (18 : Shelby Gomez PTA)  Existing Precautions/Restrictions: fall (confusion, exit alarm) (18 : Shelby Gomez PTA)  Plan of Care Review  Plan of Care Reviewed With: patient (18 : Shelby Gomez PTA)    Vitals/Pain/Safety  Pain Scale: Numbers Pre/Post-Treatment  Pain Scale: Numbers, Pretreatment: 0/10 - no pain (18 : Shelby Gomez PTA)  Positioning and Restraints  Pre-Treatment Position: in bed (18 : Shelby Gomez PTA)  Post Treatment Position: bed (18 : Shelby Gomez PTA)  In Bed: fowlers, call light within reach, exit alarm on, side rails up x3 (18 : Shelby Gomez, PTA)    Mobility,ADL,Motor, Modality  Bed Mobility Assessment/Treatment  Supine-Sit [place] (Bed Mobility): verbal cues, maximum assist (25% patient effort) (18 :  Shelby Gomez, PTA)  Sit-Supine [place] (Bed Mobility): verbal cues, minimum assist (75% patient effort) (03/23/18 0824 : Shelby Gomez, PTA)  Comment (Bed Mobility): pt. had difficulty raising upper body, but did well moving legs for both sitting up and laying down. (03/23/18 0824 : Shelby Gomez, PTA)        Static Sitting Balance  Level of [place] (Unsupported Sitting, Static Balance): minimal assist, 75% patient effort, moderate assist, 50 to 74% patient effort (03/23/18 0824 : Shelby Gomez, PTA)  Sitting Position (Unsupported Sitting, Static Balance): sitting on edge of bed (03/23/18 0824 : Shelby Gomez, PTA)  Time Able to Maintain Position (Unsupported Sitting, Static Balance): less than 15 seconds (03/23/18 0824 : Shelby Gomez, PTA)  Comment (Unsupported Sitting, Static Balance): pt. unable to sit unsupported for more than a few seconds. Heavy right lean.  (03/23/18 0824 : Shelby Gomez, PTA)  Level of [place] (Supported Sitting, Static Balance): contact guard assist, minimal assist, 75% patient effort (03/23/18 0824 : Shelby Gomez, PTA)  Sitting Position (Supported Sitting, Static Balance): sitting on edge of bed (03/23/18 0824 : Shelby Gomez, PTA)  Time Able to Maintain Position (Supported Sitting, Static Balance): 15 to 30 seconds (03/23/18 0824 : Shelby Gomez, PTA)  Comment (Supported Sitting, Static Balance): Pt. did better with using his UEs for support. Still with heavy right lean, but working on weiight shifting to the left and anterior weight shifting.  (03/23/18 0824 : Shelby Gomez, PTA)        ROM/MMT             Sensory, Edema, Orthotics          Cognition, Communication, Swallow       Outcome Summary               PT Rehab Goals     Row Name 03/19/18 0630             Bed Mobility Goal 1 (PT)    Activity/Assistive Device (Bed Mobility Goal 1, PT) rolling to left;rolling to right;supine to sit  -MARGY (r) CS (t) MARGY (c)       [place] Level/Cues Needed (Bed Mobility Goal 1, PT) minimum assist (75% or more patient effort)  -MARGY      Time Frame (Bed Mobility Goal 1, PT) long term goal (LTG);10 days  -MARGY (r) CS (t) MARGY (c)      Barriers (Bed Mobility Goal 1, PT) LOC  -MARGY (r) CS (t) MARGY (c)      Progress/Outcomes (Bed Mobility Goal 1, PT) goal ongoing  -MARGY (r) CS (t) MARGY (c)         Transfer Goal 1 (PT)    Activity/Assistive Device (Transfer Goal 1, PT) sit-to-stand/stand-to-sit;bed-to-chair/chair-to-bed  -MARGY (r) CS (t) MARGY (c)      [place] Level/Cues Needed (Transfer Goal 1, PT) moderate assist (50-74% patient effort)   x1-2  -MARGY (r) CS (t) MARGY (c)      Time Frame (Transfer Goal 1, PT) long term goal (LTG);10 days  -MARGY (r) CS (t) MARGY (c)      Barriers (Transfers Goal 1, PT) LOC  -MARGY (r) CS (t) MARGY (c)      Progress/Outcome (Transfer Goal 1, PT) goal ongoing  -MARGY (r) CS (t) MARGY (c)         Gait Training Goal 1 (PT)    Activity/Assistive Device (Gait Training Goal 1, PT) gait (walking locomotion);assistive device use  -MARGY (r) CS (t) MARGY (c)      [place] Level (Gait Training Goal 1, PT) moderate assist (50-74% patient effort)   x2  -MARGY (r) CS (t) MARGY (c)      Distance (Gait Goal 1, PT) 10ft  -MARGY (r) CS (t) MARGY (c)      Time Frame (Gait Training Goal 1, PT) long term goal (LTG);10 days  -MARGY (r) CS (t) MARGY (c)      Barriers (Gait Training Goal 1, PT) LOC  -MARGY (r) CS (t) MARGY (c)      Progress/Outcome (Gait Training Goal 1, PT) goal ongoing  -AMRGY (r) CS (t) MARGY (c)        User Key  (r) = Recorded By, (t) = Taken By, (c) = Cosigned By    Initials Name Provider Type    MARGY Coronado, PT DPT Physical Therapist    CS Panfilo Joseph, PT Student PT Student          Physical Therapy Education     Title: PT OT SLP Therapies (Active)     Topic: Physical Therapy (Active)     Point: Mobility training (Active)    Learning Progress Summary     Learner Status Readiness Method Response Comment Documented by    Patient Active Acceptance E,D NR bed  mobility and sitting balance.  03/23/18 0848     Active Acceptance E NR pt educated on progression of POC until d/c  03/19/18 1423                      User Key     Initials Effective Dates Name Provider Type [facility] 08/02/16 -  Shelby Gomez PTA Physical Therapy Assistant PT    CS 01/08/18 -  Panfilo Joseph, PT Student PT Student PT                    PT Recommendation and Plan     Plan of Care Reviewed With: patient  Progress: improving  Outcome Summary: Pt. able to participate with PT today. Pt's words at first were understandable, but as the tx progressed, were not. Pt. was max assist for supine to sit and min assist for sit to supine. Worked on sitting balance with pt which was anywhere from CGA-Mod assist x 1. Will continue to work with pt. as were are able and as he participates.          Outcome Measures     Row Name 03/23/18 0824             How much help from another person do you currently need...    Turning from your back to your side while in flat bed without using bedrails? 2  -MF      Moving from lying on back to sitting on the side of a flat bed without bedrails? 2  -MF      Moving to and from a bed to a chair (including a wheelchair)? 1  -MF      Standing up from a chair using your arms (e.g., wheelchair, bedside chair)? 1  -MF      Climbing 3-5 steps with a railing? 1  -MF      To walk in hospital room? 1  -MF      AM-PAC 6 Clicks Score 8  -MF         Functional Assessment    Outcome Measure Options AM-PAC 6 Clicks Basic Mobility (PT)  -        User Key  (r) = Recorded By, (t) = Taken By, (c) = Cosigned By    Initials Name Provider Type     Shelby Gomez PTA Physical Therapy Assistant           Time Calculation:         PT Charges     Row Name 03/23/18 0850             Time Calculation    Start Time 0824  -      Stop Time 0848  -      Time Calculation (min) 24 min  -      PT Received On 03/23/18  -      PT Goal Re-Cert Due Date 03/29/18  -         Time  Calculation- PT    Total Timed Code Minutes- PT 24 minute(s)  -        User Key  (r) = Recorded By, (t) = Taken By, (c) = Cosigned By    Initials Name Provider Type    JERRY Gomez PTA Physical Therapy Assistant          Therapy Charges for Today     Code Description Service Date Service Provider Modifiers Qty    [id number]  PT THERAPEUTIC ACT EA 15 MIN 3/23/2018 Shelby Gomez PTA GP 2          PT G-Codes  Outcome Measure Options: AM-PAC 6 Clicks Basic Mobility (PT)  Score: 11  Functional Limitation: Changing and maintaining body position  Changing and Maintaining Body Position Current Status (): At least 60 percent but less than 80 percent impaired, limited or restricted  Changing and Maintaining Body Position Goal Status (): At least 40 percent but less than 60 percent impaired, limited or restricted    Shelby Gomez PTA  3/23/2018

## 2018-03-23 NOTE — PROGRESS NOTES
[facility] Medicine Services  INPATIENT PROGRESS NOTE    Length of Stay: 5  Date of Admission: 3/18/2018  Primary Care Physician: No Known Provider    Subjective   Chief Complaint: none today, f/u afib/rvr  HPI     Again, transfer from [place] for bilateral pneumonia and A. fib with RVR.  The patient Had a bit of waxing and waning of his mental status. The patient apparently is cussing at staff one minute then lethargic the next. On my exam he is sitting up in bed. Appears awake and follows simple commands but I can not understand his attempts at communications. Patient will do simple movements of hands fingers knees and toes but is unable to reach out and grab my hands for strength assessment. No family is present.       Review of Systems   Unobtainable from patient  All pertinent negatives and positives are as above. All other systems have been reviewed and are negative unless otherwise stated.     Objective    Temp:  [96.9 °F (36.1 °C)-97.8 °F (36.6 °C)] 97.1 °F (36.2 °C)  Heart Rate:  [] 93  Resp:  [18-22] 22  BP: (117-139)/(70-93) 117/75  FiO2 (%):  [35 %] 35 %  Physical Exam   Constitutional: He appears well-developed and well-nourished.   Shunt seems much more sleepy today.  Currently wearing BiPAP.  He is minimally arousable.   HENT:   Head: Normocephalic and atraumatic.   2L NC   Eyes: Conjunctivae are normal. No scleral icterus.   Neck: Neck supple. No thyromegaly present.   Cardiovascular: Normal heart sounds and intact distal pulses.    Slightly tachycardic and irregular   Pulmonary/Chest: Effort normal. No respiratory distress.   Breathing comfortably in no distress.  He does have diminished breath sounds bilaterally however no wheezes noted   Abdominal: Soft. Bowel sounds are normal. He exhibits no distension.   Musculoskeletal: Normal range of motion. He exhibits no edema.   Neurological:   Speech is not appropriate.    Skin: Skin is warm and dry.    Psychiatric:   Will follow simple commands this afternoon.            Results Review:  I have reviewed the labs, radiology results, and diagnostic studies.    Laboratory Data:     Results from last 7 days  Lab Units 03/20/18  0910   WBC 10*3/mm3 7.23   HEMOGLOBIN g/dL 9.1*   HEMATOCRIT % 30.0*   PLATELETS 10*3/mm3 174          Results from last 7 days  Lab Units 03/23/18  0435 03/22/18  0420 03/21/18  0617   SODIUM mmol/L 149* 151* 151*   POTASSIUM mmol/L 4.0 3.9 3.8   CHLORIDE mmol/L 97* 96* 96*   CO2 mmol/L >40.0* >40.0* >40.0*   BUN mg/dL 20 19 15   CREATININE mg/dL 1.00 0.99 0.94   CALCIUM mg/dL 8.4 8.4 9.1   GLUCOSE mg/dL 112* 107* 121*       Culture Data:        Radiology Data:   Imaging Results (last 24 hours)     ** No results found for the last 24 hours. **          I have reviewed the patient current medications.     Assessment/Plan     Hospital Problem List     Atrial fibrillation with RVR          1. Atrial fib/RVR-HR improved on home meds, intermittently tachy  2. Acute diastolic CHF exacerbation with mild edema on CXR and elevated bnp  3. Substance abuse  4. Bilateral PNA on CXR from [facility]-no evidence of PNA here, rather some edema on CXR  5. Unclear baseline as far as his mentation. Possibly some TME medication related. Possible some withdrawal sx as his tox screen from [place] was positive for benzos. These are not listed on his home meds  6. Hypernatremia  7. Acute on chronic hypoxic and hypercapneic respiratory failure now needing bipap. Unclear etiology as CXR was not that impressive    Plan  1. Continue oral meds if awake enough to take them  2. Will place on bipap due to hypoxia   3.  Changed to oral omnicef yesterday after 2 days of vanoc and zosyn. Pt respiratory status worsening overnight. CXR yesterday did not demonstrate pna rather mild edema.   4. Will place on gentle fluids of D5, continue as sodium is trending down  5.  Change seroquel to 100 BID for agitation6. SW to see to talk with  family. Plan is home with . This behavior is not particularly abnormal for him  7. EKG with normal QT  8. Ct chest Reviewed.       Discharge Planning: I expect the patient to be discharged to home in 1-2 days.    Nasreen Morton,    03/23/18   4:08 PM

## 2018-03-23 NOTE — PLAN OF CARE
Problem: Patient Care Overview  Goal: Plan of Care Review  Outcome: Ongoing (interventions implemented as appropriate)   03/23/18 0819   Coping/Psychosocial   Plan of Care Reviewed With patient   Plan of Care Review   Progress improving   OTHER   Outcome Summary Pt. able to participate with PT today. Pt's words at first were understandable, but as the tx progressed, were not. Pt. was max assist for supine to sit and min assist for sit to supine. Worked on sitting balance with pt which was anywhere from CGA-Mod assist x 1. Will continue to work with pt. as were are able and as he participates.

## 2018-03-23 NOTE — THERAPY TREATMENT NOTE
Acute Care - Physical Therapy Treatment Note  [facility]     Patient Name: Froy Hoyos  : 1950  MRN: [id number]  Today's Date: 3/23/2018  Onset of Illness/Injury or Date of Surgery: 18  Date of Referral to PT: 18  Referring Physician: Dr. Morton    Admit Date: 3/18/2018    Visit Dx:    ICD-10-CM ICD-9-CM   1. Impaired mobility Z74.09 799.89     Patient Active Problem List   Diagnosis   • Chronic congestive heart failure   • HCAP (healthcare-associated pneumonia)   • Atrial fibrillation with RVR       Therapy Treatment    Therapy Treatment / Health Promotion    Treatment Time/Intention  Discipline: physical therapy assistant (18 1445 : Shelby Gomez PTA)  Document Type: therapy note (daily note) (18 1445 : Shelby Gomez PTA)  Subjective Information: no complaints (unable to understand pt. this afternoon at all.) (18 1445 : Shelby Gomez PTA)  Mode of Treatment: physical therapy (18 1445 : Shelby Gomez PTA)  Comment: Pt's words were understandable at first, but then became unintelligible. (18 08 : Shelby Gomez PTA)  Existing Precautions/Restrictions: fall (confusion, exit alarm) (18 1445 : Shelby Gomez PTA)  Plan of Care Review  Plan of Care Reviewed With: patient (18 0824 : Shelby Gomez PTA)    Vitals/Pain/Safety  Pain Scale: Numbers Pre/Post-Treatment  Pain Scale: Numbers, Pretreatment: 0/10 - no pain (18 08 : Shelby Gomez PTA)  Pain Scale: FACES Pre/Post-Treatment  Pain: FACES Scale, Pretreatment: 0-->no hurt (18 144 : Shelby Gomez PTA)  Positioning and Restraints  Pre-Treatment Position: in bed (18 144 : Shelby Gomez PTA)  Post Treatment Position: bed (18 1445 : Shelby Gomez, PTA)  In Bed: fowlers, call light within reach, exit alarm on, RUE elevated, LUE elevated (4 railings were up upon enteriing room-kept the same) (18 1445 : Shelby FINLEY  CHRIS Gomez)    Mobility,ADL,Motor, Modality  Bed Mobility Assessment/Treatment  Supine-Sit [place] (Bed Mobility): verbal cues, maximum assist (25% patient effort) (03/23/18 0824 : Shelby Gomez PTA)  Sit-Supine [place] (Bed Mobility): verbal cues, minimum assist (75% patient effort) (03/23/18 0824 : Shelby Gomez PTA)  Comment (Bed Mobility): worked on exercises in bed (03/23/18 1445 : Shelby Gomez PTA)     Upper Extremity Supine Therapeutic Exercise  Performed, Supine Upper Extremity (Therapeutic Exercise): shoulder flexion/extension, shoulder abduction/adduction, elbow flexion/extension (03/23/18 1445 : Shelby Gomez PTA)  Exercise Type, Supine Upper Extremity (Therapeutic Exercise): AAROM (active assistive range of motion), PROM (passive range of motion) (03/23/18 1445 : Shelby Gomez PTA)  Expected Outcomes, Supine Upper Extremity (Therapeutic Exercise): strengthen, facilitate independent active range of motion, strengthen normal movement patterns (03/23/18 1445 : Shelby Gomez PTA)  Sets/Reps Detail, Supine Upper Extremity (Therapeutic Exercise): 20 (03/23/18 1445 : Shelby Gomez PTA)  Comment, Supine Upper Extremity (Therapeutic Exercise): pt. not participating much, constant verbal cues (03/23/18 1445 : Shelby Gomez PTA)  Lower Extremity Supine Therapeutic Exercise  Performed, Supine Lower Extremity (Therapeutic Exercise): hip abduction/adduction, knee flexion/extension, ankle dorsiflexion/plantarflexion (03/23/18 1445 : Shelby Gomez PTA)  Exercise Type, Supine Lower Extremity (Therapeutic Exercise): AAROM (active assistive range of motion), PROM (passive range of motion) (03/23/18 1445 : Shelby Gomez PTA)  Expected Outcomes, Supine Lower Extremity (Therapeutic Exercise): strengthen normal movement patterns, strengthen, facilitate independent active range of motion (03/23/18 1445 : Shelby Gomez PTA)  Sets/Reps Detail, Supine  Lower Extremity (Therapeutic Exercise): 15,20 (03/23/18 1445 : Shelby Gomez, PTA)  Comment, Supine Lower Extremity (Therapeutic Exercise): constant cues to participate. (03/23/18 1445 : Shelby Gomez, PTA)  Static Sitting Balance  Level of [place] (Unsupported Sitting, Static Balance): minimal assist, 75% patient effort, moderate assist, 50 to 74% patient effort (03/23/18 0824 : Shelby Gomez, PTA)  Sitting Position (Unsupported Sitting, Static Balance): sitting on edge of bed (03/23/18 0824 : Shelby Gomez, PTA)  Time Able to Maintain Position (Unsupported Sitting, Static Balance): less than 15 seconds (03/23/18 0824 : Shelby Gomez, PTA)  Comment (Unsupported Sitting, Static Balance): pt. unable to sit unsupported for more than a few seconds. Heavy right lean.  (03/23/18 0824 : Shelby Gomez, PTA)  Level of [place] (Supported Sitting, Static Balance): contact guard assist, minimal assist, 75% patient effort (03/23/18 0824 : Shelby Gomez, PTA)  Sitting Position (Supported Sitting, Static Balance): sitting on edge of bed (03/23/18 0824 : Shelby Gomez, PTA)  Time Able to Maintain Position (Supported Sitting, Static Balance): 15 to 30 seconds (03/23/18 0824 : Shelby Gomez, CHRIS)  Comment (Supported Sitting, Static Balance): Pt. did better with using his UEs for support. Still with heavy right lean, but working on weiight shifting to the left and anterior weight shifting.  (03/23/18 0824 : Shelby Gomez, CHRIS)        ROM/MMT             Sensory, Edema, Orthotics          Cognition, Communication, Swallow       Outcome Summary               PT Rehab Goals     Row Name 03/19/18 3843             Bed Mobility Goal 1 (PT)    Activity/Assistive Device (Bed Mobility Goal 1, PT) rolling to left;rolling to right;supine to sit  -MARGY (r) CS (t) MARGY (c)      [place] Level/Cues Needed (Bed Mobility Goal 1, PT) minimum assist (75% or more patient effort)  -MARGY      Time  Frame (Bed Mobility Goal 1, PT) long term goal (LTG);10 days  -MARGY (r) CS (t) MARGY (c)      Barriers (Bed Mobility Goal 1, PT) LOC  -MARGY (r) CS (t) MARGY (c)      Progress/Outcomes (Bed Mobility Goal 1, PT) goal ongoing  -MARGY (r) CS (t) MARGY (c)         Transfer Goal 1 (PT)    Activity/Assistive Device (Transfer Goal 1, PT) sit-to-stand/stand-to-sit;bed-to-chair/chair-to-bed  -MARGY (r) CS (t) MARGY (c)      [place] Level/Cues Needed (Transfer Goal 1, PT) moderate assist (50-74% patient effort)   x1-2  -MARGY (r) CS (t) MARGY (c)      Time Frame (Transfer Goal 1, PT) long term goal (LTG);10 days  -MARGY (r) CS (t) MARGY (c)      Barriers (Transfers Goal 1, PT) LOC  -MARGY (r) CS (t) MARGY (c)      Progress/Outcome (Transfer Goal 1, PT) goal ongoing  -MARGY (r) CS (t) MARGY (c)         Gait Training Goal 1 (PT)    Activity/Assistive Device (Gait Training Goal 1, PT) gait (walking locomotion);assistive device use  -MARGY (r) CS (t) MARGY (c)      [place] Level (Gait Training Goal 1, PT) moderate assist (50-74% patient effort)   x2  -MARGY (r) CS (t) MARGY (c)      Distance (Gait Goal 1, PT) 10ft  -MARGY (r) CS (t) MARGY (c)      Time Frame (Gait Training Goal 1, PT) long term goal (LTG);10 days  -MARGY (r) CS (t) MARGY (c)      Barriers (Gait Training Goal 1, PT) LOC  -MARGY (r) CS (t) MARGY (c)      Progress/Outcome (Gait Training Goal 1, PT) goal ongoing  -MARGY (r) CS (t) MARGY (c)        User Key  (r) = Recorded By, (t) = Taken By, (c) = Cosigned By    Initials Name Provider Type    MARGY Coronado, PT DPT Physical Therapist    DEEPA Joseph, PT Student PT Student          Physical Therapy Education     Title: PT OT SLP Therapies (Active)     Topic: Physical Therapy (Active)     Point: Mobility training (Active)    Learning Progress Summary     Learner Status Readiness Method Response Comment Documented by    Patient Active Acceptance E,D NR bed mobility and sitting balance.  03/23/18 0848     Active Acceptance E NR pt educated on progression of POC until d/c CS  03/19/18 1423                      User Key     Initials Effective Dates Name Provider Type Discipline     08/02/16 -  Shelby Gomez PTA Physical Therapy Assistant PT    CS 01/08/18 -  Panfilo Joseph, PT Student PT Student PT                    PT Recommendation and Plan     Plan of Care Reviewed With: patient  Progress: improving  Outcome Summary: Pt. able to participate with PT today. Pt's words at first were understandable, but as the tx progressed, were not. Pt. was max assist for supine to sit and min assist for sit to supine. Worked on sitting balance with pt which was anywhere from [facility]-Mod assist x 1. Will continue to work with pt. as were are able and as he participates.          Outcome Measures     Row Name 03/23/18 0824             How much help from another person do you currently need...    Turning from your back to your side while in flat bed without using bedrails? 2  -MF      Moving from lying on back to sitting on the side of a flat bed without bedrails? 2  -MF      Moving to and from a bed to a chair (including a wheelchair)? 1  -MF      Standing up from a chair using your arms (e.g., wheelchair, bedside chair)? 1  -MF      Climbing 3-5 steps with a railing? 1  -MF      To walk in hospital room? 1  -MF      AM-PAC 6 Clicks Score 8  -MF         Functional Assessment    Outcome Measure Options AM-PAC 6 Clicks Basic Mobility (PT)  -MF        User Key  (r) = Recorded By, (t) = Taken By, (c) = Cosigned By    Initials Name Provider Type     Shelby Gomez PTA Physical Therapy Assistant           Time Calculation:         PT Charges     Row Name 03/23/18 1508 03/23/18 0850          Time Calculation    Start Time 1445  -MF 0824  -MF     Stop Time 1508  -MF 0848  -MF     Time Calculation (min) 23 min  -MF 24 min  -MF     PT Received On 03/23/18  - 03/23/18  -     PT Goal Re-Cert Due Date 03/29/18  - 03/29/18  -        Time Calculation- PT    Total Timed Code Minutes- PT 23  minute(s)  -MF 24 minute(s)  -MF       User Key  (r) = Recorded By, (t) = Taken By, (c) = Cosigned By    Initials Name Provider Type    JERRY Gomez PTA Physical Therapy Assistant          Therapy Charges for Today     Code Description Service Date Service Provider Modifiers Qty    56382264887 HC PT THERAPEUTIC ACT EA 15 MIN 3/23/2018 Shelby Gomez PTA GP 2    75883695012 HC PT THER PROC EA 15 MIN 3/23/2018 Shelby Gomez PTA GP 2          PT G-Codes  Outcome Measure Options: AM-PAC 6 Clicks Basic Mobility (PT)  Score: 11  Functional Limitation: Changing and maintaining body position  Changing and Maintaining Body Position Current Status (): At least 60 percent but less than 80 percent impaired, limited or restricted  Changing and Maintaining Body Position Goal Status (): At least 40 percent but less than 60 percent impaired, limited or restricted    Shelby Gomez PTA  3/23/2018

## 2018-03-23 NOTE — PLAN OF CARE
Problem: Patient Care Overview  Goal: Plan of Care Review   03/23/18 0344   Coping/Psychosocial   Plan of Care Reviewed With patient   Plan of Care Review   Progress no change   OTHER   Outcome Summary no prn meds given. pt slept well, slept with bipap all night. Cont. to monitory. VSS Afib      Goal: Discharge Needs Assessment  Outcome: Ongoing (interventions implemented as appropriate)    Goal: Interprofessional Rounds/Family Conf  Outcome: Ongoing (interventions implemented as appropriate)      Problem: Fall Risk (Adult)  Goal: Absence of Fall  Outcome: Ongoing (interventions implemented as appropriate)      Problem: Skin Injury Risk (Adult)  Goal: Skin Health and Integrity  Outcome: Ongoing (interventions implemented as appropriate)      Problem: Pneumonia (Adult)  Goal: Signs and Symptoms of Listed Potential Problems Will be Absent, Minimized or Managed (Pneumonia)  Outcome: Ongoing (interventions implemented as appropriate)      Problem: Arrhythmia/Dysrhythmia (Symptomatic) (Adult)  Goal: Signs and Symptoms of Listed Potential Problems Will be Absent, Minimized or Managed (Arrhythmia/Dysrhythmia)  Outcome: Ongoing (interventions implemented as appropriate)

## 2018-03-24 LAB
ALBUMIN SERPL-MCNC: 3.2 G/DL (ref 3.5–5)
ALBUMIN/GLOB SERPL: 0.9 G/DL (ref 1.1–2.5)
ALP SERPL-CCNC: 40 U/L (ref 24–120)
ALT SERPL W P-5'-P-CCNC: <15 U/L (ref 0–54)
ANION GAP SERPL CALCULATED.3IONS-SCNC: ABNORMAL MMOL/L (ref 4–13)
AST SERPL-CCNC: 15 U/L (ref 7–45)
BASOPHILS # BLD AUTO: 0.03 10*3/MM3 (ref 0–0.2)
BASOPHILS NFR BLD AUTO: 0.5 % (ref 0–2)
BILIRUB SERPL-MCNC: 0.5 MG/DL (ref 0.1–1)
BUN BLD-MCNC: 21 MG/DL (ref 5–21)
BUN/CREAT SERPL: 19.8 (ref 7–25)
CALCIUM SPEC-SCNC: 8.4 MG/DL (ref 8.4–10.4)
CHLORIDE SERPL-SCNC: 97 MMOL/L (ref 98–110)
CO2 SERPL-SCNC: >40 MMOL/L (ref 24–31)
CREAT BLD-MCNC: 1.06 MG/DL (ref 0.5–1.4)
DEPRECATED RDW RBC AUTO: 52.5 FL (ref 40–54)
EOSINOPHIL # BLD AUTO: 0.04 10*3/MM3 (ref 0–0.7)
EOSINOPHIL NFR BLD AUTO: 0.7 % (ref 0–4)
ERYTHROCYTE [DISTWIDTH] IN BLOOD BY AUTOMATED COUNT: 16.1 % (ref 12–15)
GFR SERPL CREATININE-BSD FRML MDRD: 69 ML/MIN/1.73
GLOBULIN UR ELPH-MCNC: 3.5 GM/DL
GLUCOSE BLD-MCNC: 103 MG/DL (ref 70–100)
HCT VFR BLD AUTO: 31 % (ref 40–52)
HGB BLD-MCNC: 9.2 G/DL (ref 14–18)
IMM GRANULOCYTES # BLD: 0.01 10*3/MM3 (ref 0–0.03)
IMM GRANULOCYTES NFR BLD: 0.2 % (ref 0–5)
LYMPHOCYTES # BLD AUTO: 1.21 10*3/MM3 (ref 0.72–4.86)
LYMPHOCYTES NFR BLD AUTO: 21.2 % (ref 15–45)
MCH RBC QN AUTO: 26.4 PG (ref 28–32)
MCHC RBC AUTO-ENTMCNC: 29.7 G/DL (ref 33–36)
MCV RBC AUTO: 89.1 FL (ref 82–95)
MONOCYTES # BLD AUTO: 0.65 10*3/MM3 (ref 0.19–1.3)
MONOCYTES NFR BLD AUTO: 11.4 % (ref 4–12)
NEUTROPHILS # BLD AUTO: 3.77 10*3/MM3 (ref 1.87–8.4)
NEUTROPHILS NFR BLD AUTO: 66 % (ref 39–78)
NRBC BLD MANUAL-RTO: 0 /100 WBC (ref 0–0)
PLATELET # BLD AUTO: 147 10*3/MM3 (ref 130–400)
PMV BLD AUTO: 11.3 FL (ref 6–12)
POTASSIUM BLD-SCNC: 4 MMOL/L (ref 3.5–5.3)
PROT SERPL-MCNC: 6.7 G/DL (ref 6.3–8.7)
RBC # BLD AUTO: 3.48 10*6/MM3 (ref 4.8–5.9)
SODIUM BLD-SCNC: 150 MMOL/L (ref 135–145)
WBC NRBC COR # BLD: 5.71 10*3/MM3 (ref 4.8–10.8)

## 2018-03-24 PROCEDURE — 85025 COMPLETE CBC W/AUTO DIFF WBC: CPT | Performed by: INTERNAL MEDICINE

## 2018-03-24 PROCEDURE — 80053 COMPREHEN METABOLIC PANEL: CPT | Performed by: INTERNAL MEDICINE

## 2018-03-24 PROCEDURE — 94799 UNLISTED PULMONARY SVC/PX: CPT

## 2018-03-24 PROCEDURE — 97530 THERAPEUTIC ACTIVITIES: CPT

## 2018-03-24 PROCEDURE — 25010000002 LORAZEPAM PER 2 MG: Performed by: NURSE PRACTITIONER

## 2018-03-24 PROCEDURE — 25010000002 FUROSEMIDE PER 20 MG: Performed by: FAMILY MEDICINE

## 2018-03-24 PROCEDURE — 94660 CPAP INITIATION&MGMT: CPT

## 2018-03-24 RX ORDER — LEVOFLOXACIN 750 MG/1
750 TABLET ORAL EVERY 24 HOURS
Status: DISCONTINUED | OUTPATIENT
Start: 2018-03-24 | End: 2018-03-25 | Stop reason: HOSPADM

## 2018-03-24 RX ORDER — QUETIAPINE FUMARATE 25 MG/1
50 TABLET, FILM COATED ORAL EVERY 12 HOURS SCHEDULED
Status: DISCONTINUED | OUTPATIENT
Start: 2018-03-24 | End: 2018-03-25 | Stop reason: HOSPADM

## 2018-03-24 RX ADMIN — SOTALOL HYDROCHLORIDE 80 MG: 80 TABLET ORAL at 20:45

## 2018-03-24 RX ADMIN — FAMOTIDINE 20 MG: 20 TABLET, FILM COATED ORAL at 10:03

## 2018-03-24 RX ADMIN — BUSPIRONE HYDROCHLORIDE 10 MG: 10 TABLET ORAL at 10:03

## 2018-03-24 RX ADMIN — ASPIRIN 81 MG: 81 TABLET ORAL at 10:02

## 2018-03-24 RX ADMIN — FAMOTIDINE 20 MG: 20 TABLET, FILM COATED ORAL at 20:45

## 2018-03-24 RX ADMIN — METOPROLOL TARTRATE 100 MG: 100 TABLET, FILM COATED ORAL at 20:45

## 2018-03-24 RX ADMIN — GUAIFENESIN 1200 MG: 600 TABLET, EXTENDED RELEASE ORAL at 20:45

## 2018-03-24 RX ADMIN — SOTALOL HYDROCHLORIDE 80 MG: 80 TABLET ORAL at 10:02

## 2018-03-24 RX ADMIN — METOPROLOL TARTRATE 100 MG: 100 TABLET, FILM COATED ORAL at 10:03

## 2018-03-24 RX ADMIN — DEXTROSE MONOHYDRATE, SODIUM CHLORIDE, AND POTASSIUM CHLORIDE 75 ML/HR: 50; 4.5; 1.49 INJECTION, SOLUTION INTRAVENOUS at 05:32

## 2018-03-24 RX ADMIN — ALLOPURINOL 100 MG: 100 TABLET ORAL at 10:04

## 2018-03-24 RX ADMIN — GUAIFENESIN 1200 MG: 600 TABLET, EXTENDED RELEASE ORAL at 10:02

## 2018-03-24 RX ADMIN — BUSPIRONE HYDROCHLORIDE 10 MG: 10 TABLET ORAL at 17:37

## 2018-03-24 RX ADMIN — FUROSEMIDE 20 MG: 10 INJECTION, SOLUTION INTRAMUSCULAR; INTRAVENOUS at 09:35

## 2018-03-24 RX ADMIN — DESMOPRESSIN ACETATE 20 MG: 0.2 TABLET ORAL at 20:45

## 2018-03-24 RX ADMIN — QUETIAPINE FUMARATE 50 MG: 25 TABLET, FILM COATED ORAL at 20:45

## 2018-03-24 RX ADMIN — BUDESONIDE AND FORMOTEROL FUMARATE DIHYDRATE 2 PUFF: 160; 4.5 AEROSOL RESPIRATORY (INHALATION) at 19:39

## 2018-03-24 RX ADMIN — DABIGATRAN ETEXILATE MESYLATE 150 MG: 150 CAPSULE ORAL at 20:45

## 2018-03-24 RX ADMIN — FUROSEMIDE 20 MG: 10 INJECTION, SOLUTION INTRAMUSCULAR; INTRAVENOUS at 20:44

## 2018-03-24 RX ADMIN — LISINOPRIL 40 MG: 20 TABLET ORAL at 10:03

## 2018-03-24 RX ADMIN — LEVOFLOXACIN 750 MG: 750 TABLET, FILM COATED ORAL at 13:43

## 2018-03-24 RX ADMIN — DEXTROSE MONOHYDRATE, SODIUM CHLORIDE, AND POTASSIUM CHLORIDE 75 ML/HR: 50; 4.5; 1.49 INJECTION, SOLUTION INTRAVENOUS at 19:48

## 2018-03-24 RX ADMIN — QUETIAPINE FUMARATE 100 MG: 100 TABLET ORAL at 10:03

## 2018-03-24 RX ADMIN — LORAZEPAM 1 MG: 2 INJECTION INTRAMUSCULAR; INTRAVENOUS at 03:26

## 2018-03-24 RX ADMIN — BUDESONIDE AND FORMOTEROL FUMARATE DIHYDRATE 2 PUFF: 160; 4.5 AEROSOL RESPIRATORY (INHALATION) at 08:02

## 2018-03-24 RX ADMIN — DIVALPROEX SODIUM 1000 MG: 500 TABLET, DELAYED RELEASE ORAL at 10:03

## 2018-03-24 RX ADMIN — BUSPIRONE HYDROCHLORIDE 10 MG: 10 TABLET ORAL at 20:45

## 2018-03-24 RX ADMIN — DABIGATRAN ETEXILATE MESYLATE 150 MG: 150 CAPSULE ORAL at 10:05

## 2018-03-24 NOTE — PLAN OF CARE
Problem: Patient Care Overview  Goal: Plan of Care Review  Outcome: Ongoing (interventions implemented as appropriate)   03/24/18 0420   Coping/Psychosocial   Plan of Care Reviewed With patient   Plan of Care Review   Progress no change   OTHER   Outcome Summary Patient appeared to rest most of shift. Became aggressive aroun 330 am to RN and aid regarding needing to urinate. Tried explaining multiple times regarding catheter, but patient did not want to hear. Was given 1 mg ativan and was calmer, but still restless. Patient cursed at RN and aid several times. Other times, was compliant, cooperative, took oral medications. BP elevated, but night time PO meds lowered BP as needed. Received order from Mikhail gilmore BP still elevated, but did not use. Monitor labs. Takes meds whole in applesauce. O2 lowered when patient combative and increased O2 to 5 L/min nc/ Continuous pulse ox on. Continue to monitor.      Goal: Individualization and Mutuality  Outcome: Ongoing (interventions implemented as appropriate)    Goal: Discharge Needs Assessment  Outcome: Ongoing (interventions implemented as appropriate)    Goal: Interprofessional Rounds/Family Conf  Outcome: Ongoing (interventions implemented as appropriate)      Problem: Fall Risk (Adult)  Goal: Absence of Fall  Outcome: Ongoing (interventions implemented as appropriate)      Problem: Skin Injury Risk (Adult)  Goal: Skin Health and Integrity  Outcome: Ongoing (interventions implemented as appropriate)      Problem: Pneumonia (Adult)  Goal: Signs and Symptoms of Listed Potential Problems Will be Absent, Minimized or Managed (Pneumonia)  Outcome: Ongoing (interventions implemented as appropriate)      Problem: Arrhythmia/Dysrhythmia (Symptomatic) (Adult)  Goal: Signs and Symptoms of Listed Potential Problems Will be Absent, Minimized or Managed (Arrhythmia/Dysrhythmia)  Outcome: Ongoing (interventions implemented as appropriate)

## 2018-03-24 NOTE — PLAN OF CARE
Problem: Patient Care Overview  Goal: Plan of Care Review  Outcome: Ongoing (interventions implemented as appropriate)   03/24/18 1311   Coping/Psychosocial   Plan of Care Reviewed With patient   Plan of Care Review   Progress no change   OTHER   Outcome Summary Pt. agreeable to therapy. Unable to understand pt most of the time. Pt. was max x 2 for bed mobility. WOrked on sitting balance due to heavy posterior lean. Pt. was min-mod assist with occasions of max assist. Pt. continues to be weak and unable to sit on his own. Will continue to work with pt. on strengthening and mobility.

## 2018-03-24 NOTE — THERAPY TREATMENT NOTE
Acute Care - Physical Therapy Treatment Note  [facility]     Patient Name: Froy Hoyos  : 1950  MRN: [id number]  Today's Date: 3/24/2018  Onset of Illness/Injury or Date of Surgery: 18  Date of Referral to PT: 18  Referring Physician: Dr. Morton    Admit Date: 3/18/2018    Visit Dx:    ICD-10-CM ICD-9-CM   1. Impaired mobility Z74.09 799.89     Patient Active Problem List   Diagnosis   • Chronic congestive heart failure   • HCAP (healthcare-associated pneumonia)   • Atrial fibrillation with RVR       Therapy Treatment    Therapy Treatment / Health Promotion    Treatment Time/Intention  Discipline: physical therapy assistant (18 1311 : Shelby Gomez PTA)  Document Type: therapy note (daily note) (18 1311 : Shelby Gomez PTA)  Subjective Information: no complaints (18 : Shelby Gomez PTA)  Mode of Treatment: physical therapy (18 1311 : Shelby Gomez PTA)  Existing Precautions/Restrictions: fall (confusion, exit alarm) (18 : Shelby Gomez PTA)  Patient Response to Treatment: unable to understand pt. most of the time (181 : Shelby Gomez PTA)  Plan of Care Review  Plan of Care Reviewed With: patient (18 : Shelby Gomez PTA)    Vitals/Pain/Safety  Pain Scale: Numbers Pre/Post-Treatment  Pain Scale: Numbers, Pretreatment: 0/10 - no pain (18 : Shelby Gomez PTA)  Positioning and Restraints  Pre-Treatment Position: in bed (18 131Carmen : Shelby Gomez PTA)  Post Treatment Position: bed (181 : Shelby Gomez PTA)  In Bed: notified nsg, fowlers, call light within reach, exit alarm on, side rails up x3 (18 : Shelby Gomez PTA)    Mobility,ADL,Motor, Modality  Bed Mobility Assessment/Treatment  Supine-Sit [place] (Bed Mobility): verbal cues, maximum assist (25% patient effort), 2 person assist (18 1311 : Shelby Gomez, PTA)  Sit-Supine  [place] (Bed Mobility): verbal cues, maximum assist (25% patient effort), 2 person assist (03/24/18 1311 : Shelby Gomez, CHRIS)        Static Sitting Balance  Level of [place] (Supported Sitting, Static Balance): moderate assist, 50 to 74% patient effort, minimal assist, 75% patient effort (03/24/18 1311 : Shelby Gomez, PTA)  Sitting Position (Supported Sitting, Static Balance): sitting on edge of bed (03/24/18 1311 : Shelby Gomez, CHRIS)  Comment (Supported Sitting, Static Balance): heavy posterior lean, cues to correct. Worked on reaching and hand coordination. (03/24/18 1311 : Shelby Gomez, CHRIS)        ROM/MMT             Sensory, Edema, Orthotics          Cognition, Communication, Swallow       Outcome Summary               PT Rehab Goals     Row Name 03/19/18 1315             Bed Mobility Goal 1 (PT)    Activity/Assistive Device (Bed Mobility Goal 1, PT) rolling to left;rolling to right;supine to sit  -MARGY (r) CS (t) MARGY (c)      [place] Level/Cues Needed (Bed Mobility Goal 1, PT) minimum assist (75% or more patient effort)  -MARGY      Time Frame (Bed Mobility Goal 1, PT) long term goal (LTG);10 days  -MARGY (r) CS (t) MARGY (c)      Barriers (Bed Mobility Goal 1, PT) LOC  -MARGY (r) CS (t) AMRGY (c)      Progress/Outcomes (Bed Mobility Goal 1, PT) goal ongoing  -MARGY (r) CS (t) MARGY (c)         Transfer Goal 1 (PT)    Activity/Assistive Device (Transfer Goal 1, PT) sit-to-stand/stand-to-sit;bed-to-chair/chair-to-bed  -MARGY (r) CS (t) MARGY (c)      [place] Level/Cues Needed (Transfer Goal 1, PT) moderate assist (50-74% patient effort)   x1-2  -MARGY (r) CS (t) MARGY (c)      Time Frame (Transfer Goal 1, PT) long term goal (LTG);10 days  -MARGY (r) CS (t) MARGY (c)      Barriers (Transfers Goal 1, PT) LOC  -MARGY (r) CS (t) MARGY (c)      Progress/Outcome (Transfer Goal 1, PT) goal ongoing  -MARGY (r) CS (t) MARGY (c)         Gait Training Goal 1 (PT)    Activity/Assistive Device (Gait Training Goal 1, PT) gait (walking  locomotion);assistive device use  -MARGY (r) CS (t) MARGY (c)      [place] Level (Gait Training Goal 1, PT) moderate assist (50-74% patient effort)   x2  -MARGY (r) CS (t) MARGY (c)      Distance (Gait Goal 1, PT) 10ft  -MARGY (r) CS (t) MARGY (c)      Time Frame (Gait Training Goal 1, PT) long term goal (LTG);10 days  -MARGY (r) CS (t) MARGY (c)      Barriers (Gait Training Goal 1, PT) LOC  -MARGY (r) CS (t) MARGY (c)      Progress/Outcome (Gait Training Goal 1, PT) goal ongoing  -MARGY (r) CS (t) MARGY (c)        User Key  (r) = Recorded By, (t) = Taken By, (c) = Cosigned By    Initials Name Provider Type    MARGY Coronado, PT DPT Physical Therapist    CS Panfilo Joseph, PT Student PT Student          Physical Therapy Education     Title: PT OT SLP Therapies (Active)     Topic: Physical Therapy (Active)     Point: Mobility training (Active)    Learning Progress Summary     Learner Status Readiness Method Response Comment Documented by    Patient Active Acceptance E,D NR bed mobility and sitting balance  03/24/18 1341     Active Acceptance E,D NR bed mobility and sitting balance.  03/23/18 0848     Active Acceptance E NR pt educated on progression of POC until d/c  03/19/18 1423                      User Key     Initials Effective Dates Name Provider Type Discipline     08/02/16 -  Shelby Gomez, PTA Physical Therapy Assistant PT     01/08/18 -  Panfilo Joseph, PT Student PT Student PT                    PT Recommendation and Plan     Plan of Care Reviewed With: patient  Progress: no change  Outcome Summary: Pt. agreeable to therapy. Unable to understand pt most of the time. Pt. was max x 2 for bed mobility. WOrked on sitting balance due to heavy posterior lean. Pt. was min-mod assist with occasions of max assist. Pt. continues to be weak and unable to sit on his own. Will continue to work with pt. on strengthening and mobility.          Outcome Measures     Row Name 03/24/18 1311 03/23/18 0824          How much help  from another person do you currently need...    Turning from your back to your side while in flat bed without using bedrails? 2  -MF 2  -MF     Moving from lying on back to sitting on the side of a flat bed without bedrails? 2  -MF 2  -MF     Moving to and from a bed to a chair (including a wheelchair)? 1  -MF 1  -MF     Standing up from a chair using your arms (e.g., wheelchair, bedside chair)? 1  -MF 1  -MF     Climbing 3-5 steps with a railing? 1  -MF 1  -MF     To walk in hospital room? 1  -MF 1  -MF     AM-PAC 6 Clicks Score 8  -MF 8  -MF        Functional Assessment    Outcome Measure Options AM-PAC 6 Clicks Basic Mobility (PT)  -MF AM-PAC 6 Clicks Basic Mobility (PT)  -MF       User Key  (r) = Recorded By, (t) = Taken By, (c) = Cosigned By    Initials Name Provider Type     Shelby Gomez PTA Physical Therapy Assistant           Time Calculation:         PT Charges     Row Name 03/24/18 1343             Time Calculation    Start Time 1311  -      Stop Time 1340  -      Time Calculation (min) 29 min  -      PT Received On 03/24/18  -      PT Goal Re-Cert Due Date 03/29/18  -         Time Calculation- PT    Total Timed Code Minutes- PT 29 minute(s)  -        User Key  (r) = Recorded By, (t) = Taken By, (c) = Cosigned By    Initials Name Provider Type     Shelby Gomez PTA Physical Therapy Assistant          Therapy Charges for Today     Code Description Service Date Service Provider Modifiers Qty    12781192063 HC PT THERAPEUTIC ACT EA 15 MIN 3/23/2018 Shelby Gomez PTA GP 2    98426611338 HC PT THER PROC EA 15 MIN 3/23/2018 Shelby Gomez PTA GP 2    89155953570 HC PT THERAPEUTIC ACT EA 15 MIN 3/24/2018 Shelby Gomez PTA GP 2          PT G-Codes  Outcome Measure Options: AM-PAC 6 Clicks Basic Mobility (PT)  Score: 11  Functional Limitation: Changing and maintaining body position  Changing and Maintaining Body Position Current Status (): At least 60 percent but  less than 80 percent impaired, limited or restricted  Changing and Maintaining Body Position Goal Status (): At least 40 percent but less than 60 percent impaired, limited or restricted    Shelby Gomez, PTA  3/24/2018

## 2018-03-24 NOTE — PROGRESS NOTES
[facility] Medicine Services  INPATIENT PROGRESS NOTE    Length of Stay: 6  Date of Admission: 3/18/2018  Primary Care Physician: No Known Provider    Subjective   Chief Complaint: none today, f/u afib/rvr  HPI     Again, transfer from [place] for bilateral pneumonia and A. fib with RVR.  The patient Had a bit of waxing and waning of his mental status. The patient apparently is cussing at staff one minute then lethargic the next. On my exam Today he is arousable but sleepy.  I cannot understand anything he says.  He falls back to sleep very easily.  He is currently on a nasal cannula.  He has shallow breathing and is in no distress.  He had some issue with being difficult with the nurses early this morning.      Review of Systems   Unobtainable from patient  All pertinent negatives and positives are as above. All other systems have been reviewed and are negative unless otherwise stated.     Objective    Temp:  [96.8 °F (36 °C)-98.9 °F (37.2 °C)] 97.1 °F (36.2 °C)  Heart Rate:  [] 110  Resp:  [16-22] 16  BP: (117-162)/() 135/91  Physical Exam   Constitutional: He appears well-developed and well-nourished.   Sleepy but arousable.   HENT:   Head: Normocephalic and atraumatic.   2L NC   Eyes: Conjunctivae are normal. No scleral icterus.   Neck: Neck supple. No thyromegaly present.   Cardiovascular: Normal heart sounds and intact distal pulses.    Slightly tachycardic and irregular   Pulmonary/Chest: Effort normal. No respiratory distress.   Breathing comfortably in no distress.  He does have diminished breath sounds bilaterally however no wheezes noted,    Abdominal: Soft. Bowel sounds are normal. He exhibits no distension.   Musculoskeletal: Normal range of motion. He exhibits no edema.   Neurological:   Speech is not appropriate.    Skin: Skin is warm and dry.           Results Review:  I have reviewed the labs, radiology results, and diagnostic studies.    Laboratory  Data:     Results from last 7 days  Lab Units 03/24/18  0457 03/20/18  0910   WBC 10*3/mm3 5.71 7.23   HEMOGLOBIN g/dL 9.2* 9.1*   HEMATOCRIT % 31.0* 30.0*   PLATELETS 10*3/mm3 147 174          Results from last 7 days  Lab Units 03/24/18  0457 03/23/18  0435 03/22/18  0420   SODIUM mmol/L 150* 149* 151*   POTASSIUM mmol/L 4.0 4.0 3.9   CHLORIDE mmol/L 97* 97* 96*   CO2 mmol/L >40.0* >40.0* >40.0*   BUN mg/dL 21 20 19   CREATININE mg/dL 1.06 1.00 0.99   CALCIUM mg/dL 8.4 8.4 8.4   BILIRUBIN mg/dL 0.5  --   --    ALK PHOS U/L 40  --   --    ALT (SGPT) U/L <15  --   --    AST (SGOT) U/L 15  --   --    GLUCOSE mg/dL 103* 112* 107*       Culture Data:        Radiology Data:   Imaging Results (last 24 hours)     ** No results found for the last 24 hours. **          I have reviewed the patient current medications.     Assessment/Plan     Hospital Problem List     Atrial fibrillation with RVR          1. Atrial fib/RVR-HR improved on home meds, intermittently tachy  2. Acute diastolic CHF exacerbation with mild edema on CXR and elevated bnp  3. Substance abuse  4. Bilateral PNA on CXR from [facility] CXR here just edema, but CT chest with bilateral effusions and bilateral groundglass opacities  5. Unclear baseline as far as his mentation. Possibly some TME medication related. Possible some withdrawal sx as his tox screen from [place] was positive for benzos. These are not listed on his home meds  6. Hypernatremia  7. Acute on chronic hypoxic and hypercapneic respiratory failure now needing bipap.    Plan  1. Continue oral meds if awake enough to take them  2. Will place on bipap with sleep and prn due to hypoxia   3.  Status post 2 days of iv abx. Then 2 days of oral ominicef. Will add oral levaquin due to findings on CT chest.  4. Will place on gentle fluids of D5, continue as sodium is trending down. Nursing to encourage intake of water  5.  Change seroquel to 50 BID for agitation6. SW to see to talk with family. Plan is home  with HH. This behavior is not particularly abnormal for him  7. EKG with normal QT. Continue to monitor due to current meds  8. Check a depakote level  9. D/c any prn ativan        Discharge Planning: I expect the patient to be discharged to home in 1-2 days.    Delmy Vieyra MD   03/24/18   9:37 AM

## 2018-03-24 NOTE — PLAN OF CARE
Problem: Patient Care Overview  Goal: Plan of Care Review  Outcome: Ongoing (interventions implemented as appropriate)   03/24/18 1703   Coping/Psychosocial   Plan of Care Reviewed With patient   Plan of Care Review   Progress improving   OTHER   Outcome Summary VSS, no c/o pain this shift, O2 sats @ 100% on 5L nasal cannula with humidification, decreased to 3.5L continue to monitor, offer fluids frequently to increase pt intake. Continue ot monitor for changes.       Problem: Fall Risk (Adult)  Goal: Absence of Fall  Outcome: Ongoing (interventions implemented as appropriate)   03/24/18 1703   Fall Risk (Adult)   Absence of Fall achieves outcome       Problem: Skin Injury Risk (Adult)  Goal: Skin Health and Integrity   03/24/18 1703   Skin Injury Risk (Adult)   Skin Health and Integrity making progress toward outcome       Problem: Pneumonia (Adult)  Goal: Signs and Symptoms of Listed Potential Problems Will be Absent, Minimized or Managed (Pneumonia)  Outcome: Ongoing (interventions implemented as appropriate)   03/24/18 1703   Goal/Outcome Evaluation   Problems Assessed (Pneumonia) all   Problems Present (Pneumonia) respiratory compromise       Problem: Arrhythmia/Dysrhythmia (Symptomatic) (Adult)  Goal: Signs and Symptoms of Listed Potential Problems Will be Absent, Minimized or Managed (Arrhythmia/Dysrhythmia)  Outcome: Ongoing (interventions implemented as appropriate)   03/24/18 1703   Goal/Outcome Evaluation   Problems Assessed (Arrhythmia/Dysrhythmia) all   Problems Present (Dysrhythmia) electrophysiologic conduction defect

## 2018-03-25 VITALS
DIASTOLIC BLOOD PRESSURE: 79 MMHG | OXYGEN SATURATION: 94 % | TEMPERATURE: 97.6 F | HEIGHT: 70 IN | HEART RATE: 69 BPM | BODY MASS INDEX: 29.09 KG/M2 | SYSTOLIC BLOOD PRESSURE: 135 MMHG | WEIGHT: 203.2 LBS | RESPIRATION RATE: 16 BRPM

## 2018-03-25 LAB
ANION GAP SERPL CALCULATED.3IONS-SCNC: ABNORMAL MMOL/L (ref 4–13)
BUN BLD-MCNC: 21 MG/DL (ref 5–21)
BUN/CREAT SERPL: 21.9 (ref 7–25)
CALCIUM SPEC-SCNC: 8.6 MG/DL (ref 8.4–10.4)
CHLORIDE SERPL-SCNC: 94 MMOL/L (ref 98–110)
CO2 SERPL-SCNC: >40 MMOL/L (ref 24–31)
CREAT BLD-MCNC: 0.96 MG/DL (ref 0.5–1.4)
GFR SERPL CREATININE-BSD FRML MDRD: 78 ML/MIN/1.73
GLUCOSE BLD-MCNC: 114 MG/DL (ref 70–100)
POTASSIUM BLD-SCNC: 4 MMOL/L (ref 3.5–5.3)
SODIUM BLD-SCNC: 146 MMOL/L (ref 135–145)
VALPROATE SERPL-MCNC: 36.5 MCG/ML (ref 50–100)

## 2018-03-25 PROCEDURE — 94799 UNLISTED PULMONARY SVC/PX: CPT

## 2018-03-25 PROCEDURE — 25010000002 FUROSEMIDE PER 20 MG: Performed by: FAMILY MEDICINE

## 2018-03-25 PROCEDURE — 80164 ASSAY DIPROPYLACETIC ACD TOT: CPT | Performed by: FAMILY MEDICINE

## 2018-03-25 PROCEDURE — 80048 BASIC METABOLIC PNL TOTAL CA: CPT | Performed by: INTERNAL MEDICINE

## 2018-03-25 PROCEDURE — 94660 CPAP INITIATION&MGMT: CPT

## 2018-03-25 RX ORDER — CEFDINIR 300 MG/1
300 CAPSULE ORAL 2 TIMES DAILY
Qty: 10 CAPSULE | Refills: 0 | Status: SHIPPED | OUTPATIENT
Start: 2018-03-25

## 2018-03-25 RX ORDER — QUETIAPINE FUMARATE 50 MG/1
50 TABLET, FILM COATED ORAL EVERY 12 HOURS SCHEDULED
Qty: 60 TABLET | Refills: 1 | Status: SHIPPED | OUTPATIENT
Start: 2018-03-25

## 2018-03-25 RX ORDER — LEVOFLOXACIN 750 MG/1
750 TABLET ORAL EVERY 24 HOURS
Qty: 5 TABLET | Refills: 0 | Status: SHIPPED | OUTPATIENT
Start: 2018-03-25 | End: 2018-03-30

## 2018-03-25 RX ORDER — FAMOTIDINE 20 MG/1
20 TABLET, FILM COATED ORAL 2 TIMES DAILY
Qty: 30 TABLET | Refills: 0 | Status: SHIPPED | OUTPATIENT
Start: 2018-03-25

## 2018-03-25 RX ADMIN — DABIGATRAN ETEXILATE MESYLATE 150 MG: 150 CAPSULE ORAL at 08:13

## 2018-03-25 RX ADMIN — FAMOTIDINE 20 MG: 20 TABLET, FILM COATED ORAL at 08:13

## 2018-03-25 RX ADMIN — GUAIFENESIN 1200 MG: 600 TABLET, EXTENDED RELEASE ORAL at 08:13

## 2018-03-25 RX ADMIN — FUROSEMIDE 20 MG: 10 INJECTION, SOLUTION INTRAMUSCULAR; INTRAVENOUS at 10:05

## 2018-03-25 RX ADMIN — ASPIRIN 81 MG: 81 TABLET ORAL at 08:13

## 2018-03-25 RX ADMIN — DIVALPROEX SODIUM 1000 MG: 500 TABLET, DELAYED RELEASE ORAL at 08:13

## 2018-03-25 RX ADMIN — METOPROLOL TARTRATE 100 MG: 100 TABLET, FILM COATED ORAL at 08:13

## 2018-03-25 RX ADMIN — SOTALOL HYDROCHLORIDE 80 MG: 80 TABLET ORAL at 08:13

## 2018-03-25 RX ADMIN — ALLOPURINOL 100 MG: 100 TABLET ORAL at 08:13

## 2018-03-25 RX ADMIN — QUETIAPINE FUMARATE 50 MG: 25 TABLET, FILM COATED ORAL at 08:14

## 2018-03-25 RX ADMIN — LISINOPRIL 40 MG: 20 TABLET ORAL at 08:13

## 2018-03-25 RX ADMIN — BUDESONIDE AND FORMOTEROL FUMARATE DIHYDRATE 2 PUFF: 160; 4.5 AEROSOL RESPIRATORY (INHALATION) at 06:30

## 2018-03-25 RX ADMIN — DEXTROSE MONOHYDRATE, SODIUM CHLORIDE, AND POTASSIUM CHLORIDE 75 ML/HR: 50; 4.5; 1.49 INJECTION, SOLUTION INTRAVENOUS at 09:42

## 2018-03-25 RX ADMIN — BUSPIRONE HYDROCHLORIDE 10 MG: 10 TABLET ORAL at 08:13

## 2018-03-25 NOTE — PROGRESS NOTES
Continued Stay Note   [facility]     Patient Name: Froy Hoyos  MRN: [id number]  Today's Date: 3/25/2018    Admit Date: 3/18/2018          Discharge Plan     Row Name 03/25/18 1048       Plan    Patient/Family in Agreement with Plan yes    Final Discharge Disposition Code 06 - home with home health care    Final Note Referral for hh services.  SW spoke to pt and provided a list of hh agenices and pt chose [facility].  SW spoke to Hilda on call and faxed referral.  Pt will dc today.  COLBY Carrizales.              Discharge Codes    No documentation.       Expected Discharge Date and Time     Expected Discharge Date Expected Discharge Time    Mar 25, 2018             COLBY Gardner

## 2018-03-25 NOTE — DISCHARGE PLACEMENT REQUEST
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 agreeable to therapy. Unable to understand pt most of the time. Pt. was max x 2 for bed mobility. WOrked on sitting balance due to heavy posterior lean. Pt. was min-mod assist with occasions of max assist. Pt. continues to be weak and unable to sit on his own. Will continue to work with pt. on strengthening and mobility.          Outcome Measures     Row Name 03/24/18 1311 03/23/18 0824          How much help from another person do you currently need...    Turning from your back to your side while in flat bed without using bedrails? 2  -MF 2  -MF     Moving from lying on back to sitting on the side of a flat bed without bedrails? 2  -MF 2  -MF     Moving to and from a bed to a chair (including a wheelchair)? 1  -MF 1  -MF     Standing up from a chair using your arms (e.g., wheelchair, bedside chair)? 1  -MF 1  -MF     Climbing 3-5 steps with a railing? 1  -MF 1  -MF     To walk in hospital room?  1  -MF 1  -MF     AM-PAC 6 Clicks Score 8  -MF 8  -MF        Functional Assessment    Outcome Measure Options AM-PAC 6 Clicks Basic Mobility (PT)  -MF AM-PAC 6 Clicks Basic Mobility (PT)  -MF       User Key  (r) = Recorded By, (t) = Taken By, (c) = Cosigned By    Initials Name Provider Type     Shelby Gomez PTA Physical Therapy Assistant           Time Calculation:         PT Charges     Row Name 03/24/18 1343             Time Calculation    Start Time 1311  -MF      Stop Time 1340  -MF      Time Calculation (min) 29 min  -MF      PT Received On 03/24/18  -      PT Goal Re-Cert Due Date 03/29/18  -         Time Calculation- PT    Total Timed Code Minutes- PT 29 minute(s)  -        User Key  (r) = Recorded By, (t) = Taken By, (c) = Cosigned By    Initials Name Provider Type     Shelby Gomez PTA Physical Therapy Assistant          Therapy Charges for Today     Code Description Service Date Service Provider Modifiers Qty    87019157325 HC PT THERAPEUTIC ACT EA 15 MIN 3/23/2018 Shelby Gomez PTA GP 2    61136593699 HC PT THER PROC EA 15 MIN 3/23/2018 Shelby Gomez PTA GP 2    33763280724 HC PT THERAPEUTIC ACT EA 15 MIN 3/24/2018 Shelby Gomez PTA GP 2          PT G-Codes  Outcome Measure Options: AM-PAC 6 Clicks Basic Mobility (PT)  Score: 11  Functional Limitation: Changing and maintaining body position  Changing and Maintaining Body Position Current Status (): At least 60 percent but less than 80 percent impaired, limited or restricted  Changing and Maintaining Body Position Goal Status (): At least 40 percent but less than 60 percent impaired, limited or restricted    Shelby Gomez PTA  3/24/2018         Electronically signed by Shelby Gomez PTA at 3/24/2018  1:46 PM         Discharge Summary     No notes of this type exist for this encounter.        Discharge Order     Start     Ordered    03/25/18 1006  Discharge patient  Once     Expected Discharge  Date:  03/25/18    Expected Discharge Time:  Morning    Discharge Disposition:  Home or Self Care        03/25/18 1000

## 2018-03-25 NOTE — DISCHARGE SUMMARY
[facility] Medicine Services  DISCHARGE SUMMARY       Date of Admission: 3/18/2018  Date of Discharge:  3/25/2018  Primary Care Physician: No Known Provider    Presenting Problem/History of Present Illness:  Atrial fibrillation with RVR [I48.91]     Final Discharge Diagnoses:  Hospital Problem List     Atrial fibrillation with RVR      Acute on chronic hypoxic and hypercapnic respiratory failure  Pleural effusions  Bilateral pneumonia  Acute on chronic diastolic congestive heart failure  Polysubstance abuse  Behavioral difficulties unclear exact diagnoses  Hypertension history of COPD      Consults: none    Procedures Performed:     Pertinent Test Results:   Imaging Results (last 72 hours)     ** No results found for the last 72 hours. **      ECHO  · Left ventricular systolic function is low normal. Estimated EF = 50%.  · Left ventricular diastolic dysfunction.  · Left atrial cavity size is moderately dilated.  · Mild-to-moderate mitral valve regurgitation is present  · Mild aortic and tricuspid valve regurgitation is present.      CT chest 3/21  IMPRESSION:  1. Small bilateral pleural effusions with bilateral lower lobe  atelectasis or pneumonia. Groundglass disease is noted rather diffusely  within both lungs suggesting either a diffuse interstitial pneumonitis  or pulmonary edema.  2. Moderate cardiomegaly. Coronary calcifications are noted in the LAD  distribution.  3. Stable borderline enlarged mediastinal nodes including a 9 mm short  axis right paratracheal node. No enlarged axillary adenopathy is  present..       Chief Complaint on Day of Discharge: Intermittent mental status changes and behaviors    History of Present Illness on Day of Discharge: Staff called me early this morning with patient being somewhat agitated and anxious chronic get out of bed and somewhat difficult with the nurses overnight however, my evaluation the patient is resting comfortably with no  complaints.  He is difficult to understand as he mumbles however when nursing calls to give report to the family.  Family states that this is very much his baseline.    Hospital Course:  The patient is a 67 y.o. male who presented to [facility] as transfer from [facility] hospital for concerns of A. fib RVR, bilateral lower lobe pneumonia hypoxia requiring BiPAP.  He was admitted to our ICU and transition to nasal cannula.  He was on a Cardizem drip initially however he was resumed and on his by mouth Cardizem and Lopressor.  Her report and documentation there are some concern of withdrawal as there is reported history and family history of methamphetamine use.  Initially it seems as if the patient was doing okay however the next morning the patient was essentially minimally responsive and very difficult to arouse.  Medications were attempted to be adjusted most notably that of his nighttime Seroquel 400 mg.  This was held however the patient then became very agitated and anxious combative and difficult with the nursing staff.  Cervical was not resumed however and twice a day dosing.  Again patient would have very waxing and waning mental status and behavioral issues.  At times he would be so sleepy could hardly arouse him and other times he would be very aggressive and agitated.  Family was called on multiple times and stated this is just how he does but particularly worse in the hospital.  He did have evidence of worsening hypercapnia and hypoxia on ABGs  at one point therefore he did require occasional BiPAP.  Repeat chest x-ray here demonstrated concerns of possible edema and no comment on pneumonia therefore anabolic so transition to oral.  Due to his worsening respiratory condition however I did check a chest CT scan that was concerning again for possible bilateral pneumonia as well as edema and effusions.  Patient was continued on oral antibiotics that were broadened somewhat as well as given IV  "diuretics.  His Seroquel was continued to be reduced mainly due to his continued respiratory situation as well as issues with being able to awaken him.  EKG was checked due to concerns of QT prolongation for multiple medications he takes.  QT was okay.  His heart rate was mostly controlled however he did have some issues with mild rapid rate while here.  He was resumed on his oral medications of sotalol and metoprolol.  He will be sent home on his rate controlling medications as well as per DAC suffering anticoagulation.  He will be sent home on Levaquin and Omnicef for pneumonia.  He will resume Lasix.  Family was pretty adamant that he go home with them and states they are there to help take care of him.  Also with home health.  We offered to attempt placement for him however they were not interested.     Condition on Discharge:  Stable but still gaurded    Physical Exam on Discharge:  /79 (BP Location: Left arm, Patient Position: Lying)   Pulse 106   Temp 97.6 °F (36.4 °C) (Temporal Artery )   Resp 22   Ht 177.8 cm (70\")   Wt 92.2 kg (203 lb 3.2 oz)   SpO2 91%   BMI 29.16 kg/m²   Physical Exam   Constitutional: He appears well-developed and well-nourished.   HENT:   Head: Normocephalic and atraumatic.   Eyes: Conjunctivae and EOM are normal. Pupils are equal, round, and reactive to light.   Neck: Neck supple. No JVD present. No thyromegaly present.   Cardiovascular: Regular rhythm, normal heart sounds and intact distal pulses.  Exam reveals no gallop and no friction rub.    No murmur heard.  Irregular and occasionally silghtly rapid   Pulmonary/Chest: Effort normal and breath sounds normal. No respiratory distress. He has no wheezes. He has no rales. He exhibits no tenderness.   Diminished bilaterally   Abdominal: Soft. Bowel sounds are normal. He exhibits no distension. There is no tenderness. There is no rebound and no guarding.   Musculoskeletal: Normal range of motion. He exhibits no edema, " tenderness or deformity.   Lymphadenopathy:     He has no cervical adenopathy.   Neurological: He is alert. He displays normal reflexes. No cranial nerve deficit. He exhibits normal muscle tone.   Skin: Skin is warm and dry. No rash noted.   Psychiatric: Judgment normal.         Discharge Disposition:  Home or Self Care with family and HH    Discharge Medications:   Froy Hoyos   Home Medication Instructions KENDRA:[id number]    Printed on:03/25/18 9711   Medication Information                      albuterol (PROVENTIL HFA;VENTOLIN HFA) 108 (90 Base) MCG/ACT inhaler  Inhale 2 puffs Every 4 (Four) Hours As Needed for Wheezing.             allopurinol (ZYLOPRIM) 100 MG tablet  Take 100 mg by mouth Daily.             aspirin 81 MG EC tablet  Take 81 mg by mouth Daily.             atorvastatin (LIPITOR) 20 MG tablet  Take 20 mg by mouth Daily.             budesonide-formoterol (SYMBICORT) 160-4.5 MCG/ACT inhaler  Inhale 2 puffs 2 (Two) Times a Day.             busPIRone (BUSPAR) 10 MG tablet  Take 10 mg by mouth 3 (Three) Times a Day.             cefdinir (OMNICEF) 300 MG capsule  Take 1 capsule by mouth 2 (Two) Times a Day.             dabigatran etexilate (PRADAXA) 150 MG capsu  Take 150 mg by mouth 2 (Two) Times a Day.             divalproex (DEPAKOTE) 500 MG DR tablet  Take 1,000 mg by mouth Daily.             famotidine (PEPCID) 20 MG tablet  Take 1 tablet by mouth 2 (Two) Times a Day.             furosemide (LASIX) 40 MG tablet  Take 40 mg by mouth 2 (Two) Times a Day.             guaiFENesin (MUCINEX) 600 MG 12 hr tablet  Take 2 tablets by mouth Every 12 (Twelve) Hours.             levoFLOXacin (LEVAQUIN) 750 MG tablet  Take 1 tablet by mouth Daily for 5 doses.             lisinopril (PRINIVIL,ZESTRIL) 40 MG tablet  Take 40 mg by mouth daily.             metoprolol tartrate (LOPRESSOR) 100 MG tablet  Take 100 mg by mouth 2 (Two) Times a Day.             QUEtiapine (SEROquel) 50 MG tablet  Take 1 tablet by mouth  Every 12 (Twelve) Hours.             sotalol (BETAPACE) 80 MG tablet  Take 80 mg by mouth 2 (Two) Times a Day.             tiotropium (SPIRIVA) 18 MCG per inhalation capsule  Place 1 capsule into inhaler and inhale Daily.                 Discharge Diet:   Diet Instructions     Diet: Regular, Cardiac       Discharge Diet:   Regular  Cardiac             Activity at Discharge:   Activity Instructions     Activity as Tolerated             Discharge Care Plan/Instructions:   Follow-up Appointments:   Should see PCP at next available apt    Test Results Pending at Discharge: none    Delmy Vieyra MD  03/25/18  10:09 AM    Time: 35 min

## 2018-03-25 NOTE — PLAN OF CARE
Problem: Patient Care Overview  Goal: Plan of Care Review  Outcome: Ongoing (interventions implemented as appropriate)   03/25/18 0323   Coping/Psychosocial   Plan of Care Reviewed With patient   Plan of Care Review   Progress no change   OTHER   Outcome Summary Afib . Pt became angry during night, cursing and threatening staff. O2 down to 2L via nasal cannula. Will continue to monitor.        Problem: Fall Risk (Adult)  Goal: Absence of Fall  Outcome: Ongoing (interventions implemented as appropriate)   03/25/18 0323   Fall Risk (Adult)   Absence of Fall making progress toward outcome       Problem: Skin Injury Risk (Adult)  Goal: Skin Health and Integrity  Outcome: Ongoing (interventions implemented as appropriate)   03/25/18 0323   Skin Injury Risk (Adult)   Skin Health and Integrity making progress toward outcome       Problem: Pneumonia (Adult)  Goal: Signs and Symptoms of Listed Potential Problems Will be Absent, Minimized or Managed (Pneumonia)  Outcome: Ongoing (interventions implemented as appropriate)   03/24/18 1703   Goal/Outcome Evaluation   Problems Assessed (Pneumonia) all   Problems Present (Pneumonia) respiratory compromise       Problem: Arrhythmia/Dysrhythmia (Symptomatic) (Adult)  Goal: Signs and Symptoms of Listed Potential Problems Will be Absent, Minimized or Managed (Arrhythmia/Dysrhythmia)  Outcome: Ongoing (interventions implemented as appropriate)   03/24/18 1703   Goal/Outcome Evaluation   Problems Assessed (Arrhythmia/Dysrhythmia) all   Problems Present (Dysrhythmia) electrophysiologic conduction defect

## 2018-03-26 NOTE — THERAPY DISCHARGE NOTE
Acute Care - IRF Physical Therapy Discharge Summary  [facility]       Patient Name: Froy Hoyos  : 1950  MRN: [id number]    Today's Date: 3/26/2018  Onset of Illness/Injury or Date of Surgery: 18    Date of Referral to PT: 18  Referring Physician: Dr. Morton      Admit Date: 3/18/2018      PT Recommendation and Plan    Visit Dx:    ICD-10-CM ICD-9-CM   1. Impaired mobility Z74.09 799.89             Outcome Measures     Row Name 18 1311             How much help from another person do you currently need...    Turning from your back to your side while in flat bed without using bedrails? 2  -MF      Moving from lying on back to sitting on the side of a flat bed without bedrails? 2  -MF      Moving to and from a bed to a chair (including a wheelchair)? 1  -MF      Standing up from a chair using your arms (e.g., wheelchair, bedside chair)? 1  -MF      Climbing 3-5 steps with a railing? 1  -MF      To walk in hospital room? 1  -MF      AM-PAC 6 Clicks Score 8  -MF         Functional Assessment    Outcome Measure Options AM-PAC 6 Clicks Basic Mobility (PT)  -MF        User Key  (r) = Recorded By, (t) = Taken By, (c) = Cosigned By    Initials Name Provider Type    JERRY Gomez PTA Physical Therapy Assistant                        PT Discharge Summary  Anticipated Discharge Disposition (PT): skilled nursing facility (SNF)  Reason for Discharge: Discharge from facility  Outcomes Achieved: Unable to make functional progress toward goals at this time  Discharge Destination: Home with assist, Home with home health      Leatha Mujica PTA   3/26/2018

## 2022-05-10 NOTE — PROGRESS NOTES
[facility] Medicine Services  INPATIENT PROGRESS NOTE    Length of Stay: 2  Date of Admission: 8/15/2017  [facility] Physician: [facility]    Subjective   Chief Complaint: shortness of breath   HPI   He has improved.  He is currently satting in the upper 90s on 2 L nasal cannula.  No complaints of breath or chest pain.  Still a bit tired and wants to sleep area that he has his days and nights mixed up.  He slept all day yesterday and then apparently was up all night.    He currently denies chest discomfort or shortness of breath.    Review of Systems   All pertinent negatives and positives are as above. All other systems have been reviewed and are negative unless otherwise stated.     Objective    Temp:  [97.5 °F (36.4 °C)-98.4 °F (36.9 °C)] 98 °F (36.7 °C)  Heart Rate:  [] 99  Resp:  [17-21] 20  BP: (106-162)/() 129/73  Physical Exam  Constitutional:   Chronically ill-appearing.  Off BiPAP and on cannula.  Seen and discussed with his nurse, Almita.   Head: Normocephalic and atraumatic. Edentulous, mumbles frequently.  Eyes: Conjunctivae and EOM are normal. Pupils are equal, round, and reactive to light.   Neck: Neck supple. No JVD present.   Cardiovascular: Normal rate, regular rhythm, normal heart sounds and intact distal pulses.  Exam reveals no gallop and no friction rub.  No murmur heard.  Pulmonary/Chest: Effort normal and breath sounds normal. No respiratory distress. He has no wheezes. He has no rales. He exhibits no tenderness. Diminished at the bases, but clear.   Abdominal: Soft. Bowel sounds are normal. He exhibits no distension. There is no tenderness. There is no rebound and no guarding.   Musculoskeletal: Normal range of motion. He exhibits no edema, tenderness or deformity.   Neurological: He is alert. He displays normal reflexes. No cranial nerve deficit. He exhibits normal muscle tone.   Skin: Skin is warm and dry. No rash noted.   Psychiatric: More  Kidney function is worse, did he stop Naproxen completely? Avoid all NSAIDs. Please have him repeat renal function next Monday, we'll see him for f/u next Wednesday. interactive today, but still wanting to sleep.     Intake/Output Summary (Last 24 hours) at 08/17/17 0902  Last data filed at 08/17/17 0100   Gross per 24 hour   Intake              590 ml   Output             1775 ml   Net            -1185 ml     Results Review:  I have reviewed the labs, radiology results, and diagnostic studies.    Culture Data:   Blood Culture   Date Value Ref Range Status   08/15/2017 No growth at 24 hours  Preliminary   08/15/2017 No growth at 24 hours  Preliminary     Urine Culture   Date Value Ref Range Status   08/15/2017 No growth at 2 days  Final   08/15/2017 No growth at 2 days  Final     Radiology Data:   Imaging Results (last 24 hours)     Procedure Component Value Units Date/Time    XR Chest 1 View [[id number]] Collected:  08/17/17 0740     Updated:  08/17/17 0745    Narrative:       EXAMINATION: XR CHEST 1 VW- 8/17/2017 7:40 AM CDT     HISTORY: Respiratory failure.     REPORT: Comparison is made with the study from 08/15/2017.     Lungs are moderately hypoaerated, there is central and basilar vascular  crowding, with normal heart size. No pulmonary consolidation is  identified. There is no pneumothorax or definite effusion. The osseous  structures are unremarkable.       Impression:       Moderate pulmonary hypoventilation with vascular crowding,  no active CHF or definite pneumonia.  This report was finalized on 08/17/2017 07:42 by Dr. Daniel Jimenez MD.        I have reviewed the patient current medications.     Assessment/Plan   Assessment:   1.  Acute on chronic hypoxic and hypercarbic respiratory failure.  2.  Healthcare associated pneumonia.  3.  Atrial fibrillation, not on chronic anticoagulation.   4.  Acute on chronic systolic heart failure with ejection fraction 40-45%.  5.  Coronary artery disease, exact details unknown, nothing specific in Mansi notes.  6.  Systemic arterial hypertension.  7.  Chronic kidney disease, stage III.  8.  Remote tobacco abuse with likely  underlying chronic obstructive pulmonary disease.  9.  History of polysubstance abuse.  10.  Bipolar disorder.  11.  Normocytic anemia.  12.  Thrombocytopenia, mild, chronic.     Plan:   Continue BiPAP as needed.  Wean oxygen as tolerated.     Continue broad-spectrum antibiotics.  Changed Levaquin to oral on 8/16.  De-escalate vancomycin today. Mucinex, inhaled bronchodilators (Xopenex and Atrovent).     Continue Lasix to 40 mg IV twice per day.  Monitor electrolytes and renal function with doing so.  Check BMP now. Strict intake and output.     Continue rate controlling medications and aspirin.     Lovenox for DVT prophylaxis.     Transfer to the floor today.  Consult physical and occupational therapy.    Clint Torres,    08/17/17   9:00 AM